# Patient Record
Sex: FEMALE | Race: WHITE | NOT HISPANIC OR LATINO | Employment: OTHER | URBAN - METROPOLITAN AREA
[De-identification: names, ages, dates, MRNs, and addresses within clinical notes are randomized per-mention and may not be internally consistent; named-entity substitution may affect disease eponyms.]

---

## 2017-01-05 ENCOUNTER — TELEPHONE (OUTPATIENT)
Dept: PULMONOLOGY | Facility: CLINIC | Age: 57
End: 2017-01-05

## 2017-01-05 NOTE — TELEPHONE ENCOUNTER
Patient called to schedule an appointment with the [facility] program. Left message for patient to contact clinic.

## 2017-01-16 ENCOUNTER — TELEPHONE (OUTPATIENT)
Dept: RESPIRATORY THERAPY | Facility: HOSPITAL | Age: 57
End: 2017-01-16

## 2017-01-16 NOTE — TELEPHONE ENCOUNTER
Patient called schedule an appointment with [facility]. Patient was seen in hospital. Patient is not ready to schedule an appointment at this time. Patient was educated on using her oxygen and on using medications. Patient states she is using Symbicort BID and Ventolin inhaler several times a day. She states she gets SOB on exertion even while wearing her oxygen. She states she dose have a cough productive of white sputum. She has an follow up appointment tomorrow she is advised to keep that appointment. She states she will contact the clinic once she is ready to schedule an appointment. Patient also states she is not using the Dulera because she is not able to afford the medication. Patient informed she may be able to get assists with getting her medications.

## 2017-05-14 ENCOUNTER — HOSPITAL ENCOUNTER (INPATIENT)
Facility: HOSPITAL | Age: 57
LOS: 13 days | Discharge: HOME HEALTH | DRG: 189 | End: 2017-05-27
Attending: EMERGENCY MEDICINE | Admitting: INTERNAL MEDICINE
Payer: MEDICARE

## 2017-05-14 DIAGNOSIS — R13.12 OROPHARYNGEAL DYSPHAGIA: ICD-10-CM

## 2017-05-14 DIAGNOSIS — R06.02 SOB (SHORTNESS OF BREATH): ICD-10-CM

## 2017-05-14 DIAGNOSIS — R06.03 RESPIRATORY DISTRESS: ICD-10-CM

## 2017-05-14 DIAGNOSIS — R53.81 PHYSICAL DECONDITIONING: ICD-10-CM

## 2017-05-14 DIAGNOSIS — R06.02 SHORTNESS OF BREATH: ICD-10-CM

## 2017-05-14 DIAGNOSIS — F41.9 ANXIETY: ICD-10-CM

## 2017-05-14 DIAGNOSIS — R94.31 ABNORMAL EKG: ICD-10-CM

## 2017-05-14 DIAGNOSIS — R94.31 ST ELEVATION: ICD-10-CM

## 2017-05-14 DIAGNOSIS — R07.9 CHEST PAIN: ICD-10-CM

## 2017-05-14 DIAGNOSIS — Z72.0 TOBACCO ABUSE: ICD-10-CM

## 2017-05-14 DIAGNOSIS — J18.9 PNEUMONIA OF RIGHT LUNG DUE TO INFECTIOUS ORGANISM, UNSPECIFIED PART OF LUNG: ICD-10-CM

## 2017-05-14 DIAGNOSIS — R57.9 SHOCK CIRCULATORY: ICD-10-CM

## 2017-05-14 DIAGNOSIS — R79.89 LFT ELEVATION: ICD-10-CM

## 2017-05-14 DIAGNOSIS — J44.1 COPD EXACERBATION: Primary | ICD-10-CM

## 2017-05-14 DIAGNOSIS — F05 DELIRIUM DUE TO ANOTHER MEDICAL CONDITION, ACUTE, HYPERACTIVE: ICD-10-CM

## 2017-05-14 DIAGNOSIS — J96.21 ACUTE ON CHRONIC RESPIRATORY FAILURE WITH HYPOXIA: ICD-10-CM

## 2017-05-14 DIAGNOSIS — I50.22 CHRONIC SYSTOLIC HEART FAILURE: ICD-10-CM

## 2017-05-14 PROBLEM — J96.01 ACUTE HYPOXEMIC RESPIRATORY FAILURE: Status: ACTIVE | Noted: 2017-05-14

## 2017-05-14 LAB
ANION GAP SERPL CALC-SCNC: 14 MMOL/L
BASOPHILS # BLD AUTO: 0.02 K/UL
BASOPHILS NFR BLD: 0.2 %
BNP SERPL-MCNC: 11 PG/ML
BUN SERPL-MCNC: 11 MG/DL
CALCIUM SERPL-MCNC: 9.6 MG/DL
CHLORIDE SERPL-SCNC: 97 MMOL/L
CO2 SERPL-SCNC: 24 MMOL/L
CREAT SERPL-MCNC: 0.8 MG/DL
DIFFERENTIAL METHOD: ABNORMAL
EOSINOPHIL # BLD AUTO: 0 K/UL
EOSINOPHIL NFR BLD: 0 %
ERYTHROCYTE [DISTWIDTH] IN BLOOD BY AUTOMATED COUNT: 13.9 %
EST. GFR  (AFRICAN AMERICAN): >60 ML/MIN/1.73 M^2
EST. GFR  (NON AFRICAN AMERICAN): >60 ML/MIN/1.73 M^2
GLUCOSE SERPL-MCNC: 116 MG/DL
HCO3 UR-SCNC: 21.7 MMOL/L
HCO3 UR-SCNC: 24.8 MMOL/L
HCT VFR BLD AUTO: 43.4 %
HGB BLD-MCNC: 14.1 G/DL
INR PPP: 1
LACTATE SERPL-SCNC: 2 MMOL/L
LACTATE SERPL-SCNC: 2.1 MMOL/L
LACTATE SERPL-SCNC: 3.7 MMOL/L
LYMPHOCYTES # BLD AUTO: 0.5 K/UL
LYMPHOCYTES NFR BLD: 4.1 %
Lab: NORMAL
MCH RBC QN AUTO: 33.2 PG
MCHC RBC AUTO-ENTMCNC: 32.5 %
MCV RBC AUTO: 102 FL
MONOCYTES # BLD AUTO: 1.2 K/UL
MONOCYTES NFR BLD: 8.9 %
NEUTROPHILS # BLD AUTO: 11.3 K/UL
NEUTROPHILS NFR BLD: 86.8 %
PH SMN: 7.34 [PH]
PH SMN: 7.42 [PH]
PLATELET # BLD AUTO: 163 K/UL
PMV BLD AUTO: 12.2 FL
PO2 BLDA: 68 MM[HG]
PO2 BLDA: 80 MM[HG]
POC BE: -4
POC BE: 0
POC CO2: 37.4
POC CO2: 39.7
POC FIO2: 32
POC SATURATED O2: 94
POC SATURATED O2: 95
POTASSIUM SERPL-SCNC: 4.3 MMOL/L
PROTHROMBIN TIME: 10.1 SEC
RBC # BLD AUTO: 4.25 M/UL
SODIUM SERPL-SCNC: 135 MMOL/L
TROPONIN I SERPL DL<=0.01 NG/ML-MCNC: 0.02 NG/ML
WBC # BLD AUTO: 13.03 K/UL

## 2017-05-14 PROCEDURE — 25500020 PHARM REV CODE 255: Performed by: EMERGENCY MEDICINE

## 2017-05-14 PROCEDURE — 63600175 PHARM REV CODE 636 W HCPCS: Performed by: NURSE PRACTITIONER

## 2017-05-14 PROCEDURE — 82803 BLOOD GASES ANY COMBINATION: CPT

## 2017-05-14 PROCEDURE — 25000003 PHARM REV CODE 250: Performed by: NURSE PRACTITIONER

## 2017-05-14 PROCEDURE — 83605 ASSAY OF LACTIC ACID: CPT | Mod: 91

## 2017-05-14 PROCEDURE — 36600 WITHDRAWAL OF ARTERIAL BLOOD: CPT

## 2017-05-14 PROCEDURE — 80048 BASIC METABOLIC PNL TOTAL CA: CPT

## 2017-05-14 PROCEDURE — 87040 BLOOD CULTURE FOR BACTERIA: CPT | Mod: 59

## 2017-05-14 PROCEDURE — 94640 AIRWAY INHALATION TREATMENT: CPT

## 2017-05-14 PROCEDURE — 96361 HYDRATE IV INFUSION ADD-ON: CPT

## 2017-05-14 PROCEDURE — 96367 TX/PROPH/DG ADDL SEQ IV INF: CPT

## 2017-05-14 PROCEDURE — 84484 ASSAY OF TROPONIN QUANT: CPT

## 2017-05-14 PROCEDURE — 85610 PROTHROMBIN TIME: CPT

## 2017-05-14 PROCEDURE — 87205 SMEAR GRAM STAIN: CPT

## 2017-05-14 PROCEDURE — 99900035 HC TECH TIME PER 15 MIN (STAT)

## 2017-05-14 PROCEDURE — 85025 COMPLETE CBC W/AUTO DIFF WBC: CPT

## 2017-05-14 PROCEDURE — 87185 SC STD ENZYME DETCJ PER NZM: CPT

## 2017-05-14 PROCEDURE — 87070 CULTURE OTHR SPECIMN AEROBIC: CPT

## 2017-05-14 PROCEDURE — 21400001 HC TELEMETRY ROOM

## 2017-05-14 PROCEDURE — 93010 ELECTROCARDIOGRAM REPORT: CPT | Mod: ,,, | Performed by: INTERNAL MEDICINE

## 2017-05-14 PROCEDURE — 25000003 PHARM REV CODE 250: Performed by: HOSPITALIST

## 2017-05-14 PROCEDURE — 25000242 PHARM REV CODE 250 ALT 637 W/ HCPCS: Performed by: NURSE PRACTITIONER

## 2017-05-14 PROCEDURE — 25000242 PHARM REV CODE 250 ALT 637 W/ HCPCS: Performed by: EMERGENCY MEDICINE

## 2017-05-14 PROCEDURE — 96372 THER/PROPH/DIAG INJ SC/IM: CPT

## 2017-05-14 PROCEDURE — 63600175 PHARM REV CODE 636 W HCPCS: Performed by: EMERGENCY MEDICINE

## 2017-05-14 PROCEDURE — 63600175 PHARM REV CODE 636 W HCPCS: Performed by: HOSPITALIST

## 2017-05-14 PROCEDURE — 27000221 HC OXYGEN, UP TO 24 HOURS

## 2017-05-14 PROCEDURE — 99285 EMERGENCY DEPT VISIT HI MDM: CPT | Mod: 25

## 2017-05-14 PROCEDURE — 96375 TX/PRO/DX INJ NEW DRUG ADDON: CPT

## 2017-05-14 PROCEDURE — 93005 ELECTROCARDIOGRAM TRACING: CPT

## 2017-05-14 PROCEDURE — 96365 THER/PROPH/DIAG IV INF INIT: CPT

## 2017-05-14 PROCEDURE — 25000003 PHARM REV CODE 250: Performed by: EMERGENCY MEDICINE

## 2017-05-14 PROCEDURE — 83880 ASSAY OF NATRIURETIC PEPTIDE: CPT

## 2017-05-14 RX ORDER — PAROXETINE HYDROCHLORIDE 20 MG/1
20 TABLET, FILM COATED ORAL EVERY MORNING
Status: DISCONTINUED | OUTPATIENT
Start: 2017-05-15 | End: 2017-05-17

## 2017-05-14 RX ORDER — SODIUM CHLORIDE 9 MG/ML
1000 INJECTION, SOLUTION INTRAVENOUS ONCE
Status: COMPLETED | OUTPATIENT
Start: 2017-05-14 | End: 2017-05-14

## 2017-05-14 RX ORDER — IBUPROFEN 200 MG
1 TABLET ORAL DAILY
Status: COMPLETED | OUTPATIENT
Start: 2017-05-14 | End: 2017-05-19

## 2017-05-14 RX ORDER — BENZONATATE 100 MG/1
200 CAPSULE ORAL 3 TIMES DAILY PRN
Status: DISCONTINUED | OUTPATIENT
Start: 2017-05-14 | End: 2017-05-17

## 2017-05-14 RX ORDER — DIPHENHYDRAMINE HYDROCHLORIDE 50 MG/ML
6.25 INJECTION INTRAMUSCULAR; INTRAVENOUS EVERY 4 HOURS PRN
Status: DISCONTINUED | OUTPATIENT
Start: 2017-05-14 | End: 2017-05-17

## 2017-05-14 RX ORDER — ARFORMOTEROL TARTRATE 15 UG/2ML
15 SOLUTION RESPIRATORY (INHALATION) EVERY 12 HOURS
Status: DISCONTINUED | OUTPATIENT
Start: 2017-05-14 | End: 2017-05-27 | Stop reason: HOSPADM

## 2017-05-14 RX ORDER — GUAIFENESIN 600 MG/1
600 TABLET, EXTENDED RELEASE ORAL 2 TIMES DAILY
Status: DISCONTINUED | OUTPATIENT
Start: 2017-05-14 | End: 2017-05-15

## 2017-05-14 RX ORDER — METHYLPREDNISOLONE SOD SUCC 125 MG
125 VIAL (EA) INJECTION ONCE
Status: COMPLETED | OUTPATIENT
Start: 2017-05-14 | End: 2017-05-14

## 2017-05-14 RX ORDER — ALBUTEROL SULFATE 2.5 MG/.5ML
2.5 SOLUTION RESPIRATORY (INHALATION) EVERY 4 HOURS
Status: DISCONTINUED | OUTPATIENT
Start: 2017-05-14 | End: 2017-05-14

## 2017-05-14 RX ORDER — FAMOTIDINE 20 MG/1
20 TABLET, FILM COATED ORAL 2 TIMES DAILY
Status: DISCONTINUED | OUTPATIENT
Start: 2017-05-14 | End: 2017-05-17

## 2017-05-14 RX ORDER — LURASIDONE HYDROCHLORIDE 40 MG/1
80 TABLET, FILM COATED ORAL NIGHTLY
Status: DISCONTINUED | OUTPATIENT
Start: 2017-05-14 | End: 2017-05-17

## 2017-05-14 RX ORDER — MONTELUKAST SODIUM 10 MG/1
10 TABLET ORAL NIGHTLY
Status: DISCONTINUED | OUTPATIENT
Start: 2017-05-14 | End: 2017-05-18

## 2017-05-14 RX ORDER — ROPINIROLE 1 MG/1
1 TABLET, FILM COATED ORAL NIGHTLY
Status: DISCONTINUED | OUTPATIENT
Start: 2017-05-14 | End: 2017-05-17

## 2017-05-14 RX ORDER — LORAZEPAM 0.5 MG/1
0.5 TABLET ORAL EVERY 6 HOURS PRN
Status: DISCONTINUED | OUTPATIENT
Start: 2017-05-14 | End: 2017-05-18

## 2017-05-14 RX ORDER — AMITRIPTYLINE HYDROCHLORIDE 25 MG/1
25 TABLET, FILM COATED ORAL 3 TIMES DAILY
Status: DISCONTINUED | OUTPATIENT
Start: 2017-05-14 | End: 2017-05-17

## 2017-05-14 RX ORDER — ONDANSETRON 2 MG/ML
4 INJECTION INTRAMUSCULAR; INTRAVENOUS EVERY 8 HOURS PRN
Status: DISCONTINUED | OUTPATIENT
Start: 2017-05-14 | End: 2017-05-27 | Stop reason: HOSPADM

## 2017-05-14 RX ORDER — BUDESONIDE 0.5 MG/2ML
0.5 INHALANT ORAL EVERY 12 HOURS
Status: DISCONTINUED | OUTPATIENT
Start: 2017-05-14 | End: 2017-05-27 | Stop reason: HOSPADM

## 2017-05-14 RX ORDER — SODIUM CHLORIDE 9 MG/ML
INJECTION, SOLUTION INTRAVENOUS CONTINUOUS
Status: DISCONTINUED | OUTPATIENT
Start: 2017-05-14 | End: 2017-05-14

## 2017-05-14 RX ORDER — FLUOXETINE HYDROCHLORIDE 20 MG/1
20 CAPSULE ORAL DAILY
Status: DISCONTINUED | OUTPATIENT
Start: 2017-05-15 | End: 2017-05-17

## 2017-05-14 RX ORDER — IPRATROPIUM BROMIDE AND ALBUTEROL SULFATE 2.5; .5 MG/3ML; MG/3ML
3 SOLUTION RESPIRATORY (INHALATION) ONCE
Status: COMPLETED | OUTPATIENT
Start: 2017-05-14 | End: 2017-05-14

## 2017-05-14 RX ORDER — LEVALBUTEROL INHALATION SOLUTION 0.63 MG/3ML
0.63 SOLUTION RESPIRATORY (INHALATION) EVERY 8 HOURS
Status: DISCONTINUED | OUTPATIENT
Start: 2017-05-14 | End: 2017-05-14

## 2017-05-14 RX ORDER — IPRATROPIUM BROMIDE AND ALBUTEROL SULFATE 2.5; .5 MG/3ML; MG/3ML
3 SOLUTION RESPIRATORY (INHALATION)
Status: COMPLETED | OUTPATIENT
Start: 2017-05-14 | End: 2017-05-14

## 2017-05-14 RX ORDER — MAG HYDROX/ALUMINUM HYD/SIMETH 200-200-20
30 SUSPENSION, ORAL (FINAL DOSE FORM) ORAL EVERY 6 HOURS PRN
Status: DISCONTINUED | OUTPATIENT
Start: 2017-05-14 | End: 2017-05-27 | Stop reason: HOSPADM

## 2017-05-14 RX ORDER — IPRATROPIUM BROMIDE AND ALBUTEROL SULFATE 2.5; .5 MG/3ML; MG/3ML
3 SOLUTION RESPIRATORY (INHALATION) EVERY 6 HOURS
Status: DISCONTINUED | OUTPATIENT
Start: 2017-05-14 | End: 2017-05-24

## 2017-05-14 RX ORDER — CIPROFLOXACIN 2 MG/ML
400 INJECTION, SOLUTION INTRAVENOUS
Status: DISCONTINUED | OUTPATIENT
Start: 2017-05-14 | End: 2017-05-16

## 2017-05-14 RX ORDER — ENOXAPARIN SODIUM 100 MG/ML
40 INJECTION SUBCUTANEOUS EVERY 24 HOURS
Status: DISCONTINUED | OUTPATIENT
Start: 2017-05-14 | End: 2017-05-20

## 2017-05-14 RX ORDER — ACETAMINOPHEN 325 MG/1
650 TABLET ORAL EVERY 6 HOURS PRN
Status: DISCONTINUED | OUTPATIENT
Start: 2017-05-14 | End: 2017-05-27 | Stop reason: HOSPADM

## 2017-05-14 RX ORDER — ONDANSETRON 2 MG/ML
4 INJECTION INTRAMUSCULAR; INTRAVENOUS
Status: COMPLETED | OUTPATIENT
Start: 2017-05-14 | End: 2017-05-14

## 2017-05-14 RX ADMIN — IPRATROPIUM BROMIDE AND ALBUTEROL SULFATE 3 ML: .5; 3 SOLUTION RESPIRATORY (INHALATION) at 08:05

## 2017-05-14 RX ADMIN — PANTOPRAZOLE SODIUM 600 MG: 40 TABLET, DELAYED RELEASE ORAL at 04:05

## 2017-05-14 RX ADMIN — ONDANSETRON 4 MG: 2 INJECTION INTRAMUSCULAR; INTRAVENOUS at 08:05

## 2017-05-14 RX ADMIN — LORAZEPAM 0.5 MG: 0.5 TABLET ORAL at 04:05

## 2017-05-14 RX ADMIN — METHYLPREDNISOLONE SODIUM SUCCINATE 40 MG: 40 INJECTION, POWDER, FOR SOLUTION INTRAMUSCULAR; INTRAVENOUS at 11:05

## 2017-05-14 RX ADMIN — SODIUM CHLORIDE 1000 ML: 0.9 INJECTION, SOLUTION INTRAVENOUS at 08:05

## 2017-05-14 RX ADMIN — ROPINIROLE HYDROCHLORIDE 1 MG: 1 TABLET, FILM COATED ORAL at 09:05

## 2017-05-14 RX ADMIN — SODIUM CHLORIDE 1000 ML: 0.9 INJECTION, SOLUTION INTRAVENOUS at 11:05

## 2017-05-14 RX ADMIN — BUDESONIDE 0.5 MG: 0.5 SUSPENSION RESPIRATORY (INHALATION) at 07:05

## 2017-05-14 RX ADMIN — ARFORMOTEROL TARTRATE 15 MCG: 15 SOLUTION RESPIRATORY (INHALATION) at 07:05

## 2017-05-14 RX ADMIN — SODIUM CHLORIDE 1000 ML: 0.9 INJECTION, SOLUTION INTRAVENOUS at 05:05

## 2017-05-14 RX ADMIN — PIPERACILLIN AND TAZOBACTAM 4.5 G: 4; .5 INJECTION, POWDER, LYOPHILIZED, FOR SOLUTION INTRAVENOUS; PARENTERAL at 10:05

## 2017-05-14 RX ADMIN — ENOXAPARIN SODIUM 40 MG: 100 INJECTION SUBCUTANEOUS at 05:05

## 2017-05-14 RX ADMIN — IPRATROPIUM BROMIDE AND ALBUTEROL SULFATE 3 ML: .5; 3 SOLUTION RESPIRATORY (INHALATION) at 07:05

## 2017-05-14 RX ADMIN — BENZONATATE 200 MG: 100 CAPSULE ORAL at 05:05

## 2017-05-14 RX ADMIN — METHYLPREDNISOLONE SODIUM SUCCINATE 125 MG: 125 INJECTION, POWDER, FOR SOLUTION INTRAMUSCULAR; INTRAVENOUS at 08:05

## 2017-05-14 RX ADMIN — NICOTINE 1 PATCH: 21 PATCH, EXTENDED RELEASE TRANSDERMAL at 05:05

## 2017-05-14 RX ADMIN — LEVALBUTEROL 0.63 MG: 0.63 SOLUTION RESPIRATORY (INHALATION) at 03:05

## 2017-05-14 RX ADMIN — VANCOMYCIN HYDROCHLORIDE 1000 MG: 1 INJECTION, POWDER, LYOPHILIZED, FOR SOLUTION INTRAVENOUS at 10:05

## 2017-05-14 RX ADMIN — CIPROFLOXACIN 400 MG: 2 INJECTION, SOLUTION INTRAVENOUS at 05:05

## 2017-05-14 RX ADMIN — AMITRIPTYLINE HYDROCHLORIDE 25 MG: 25 TABLET, FILM COATED ORAL at 09:05

## 2017-05-14 RX ADMIN — MONTELUKAST SODIUM 10 MG: 10 TABLET, FILM COATED ORAL at 09:05

## 2017-05-14 RX ADMIN — FAMOTIDINE 20 MG: 20 TABLET ORAL at 09:05

## 2017-05-14 RX ADMIN — PIPERACILLIN AND TAZOBACTAM 4.5 G: 4; .5 INJECTION, POWDER, LYOPHILIZED, FOR SOLUTION INTRAVENOUS; PARENTERAL at 07:05

## 2017-05-14 RX ADMIN — LORAZEPAM 0.5 MG: 0.5 TABLET ORAL at 10:05

## 2017-05-14 RX ADMIN — ALBUTEROL SULFATE 2.5 MG: 2.5 SOLUTION RESPIRATORY (INHALATION) at 01:05

## 2017-05-14 RX ADMIN — IOHEXOL 100 ML: 350 INJECTION, SOLUTION INTRAVENOUS at 10:05

## 2017-05-14 NOTE — ED NOTES
Patient sleeping in bed, no acute distress noted, respirations even and unlabored while using 2L of oxygen via nasal cannula, and skin is warm and dry. Patient verbalized understanding of status and plan of care. Patient denies needs at this time. Side rails up x 2, call light in reach, bed low and locked. Family at bedside. Will continue to monitor.

## 2017-05-14 NOTE — ED NOTES
Patient states she is feeling anxious which she believes could be contributing to her exacerbation. Prn Ativan will be provided.

## 2017-05-14 NOTE — ED NOTES
Patient resting in bed, no acute distress noted, awake, alert, and oriented x 3, calm, respirations even and unlabored while on 2L O2 via nasal cannula, and skin is warm and dry. IVF currently infusing. Patient verbalized understanding of status and plan of care. Patient denies needs at this time. Side rails up x 2, call light in reach, bed low and locked. Will continue to monitor.

## 2017-05-14 NOTE — H&P
[facility] Hospital Medicine  History & Physical    Patient Name: Jeniffer Fernandez  MRN: [id number]  Admission Date: 5/14/2017  Attending Physician: Qasim Hernández MD   Primary Care Provider: Ben Pelaez Jr, MD         Patient information was obtained from patient and ER records.     Subjective:     Principal Problem:Acute on chronic respiratory failure with hypoxia    Chief Complaint:   Chief Complaint   Patient presents with    Shortness of Breath     hx of emphysema & on home O2 at 2 lpm prn, reports SOB x 1 week, seen by pulmonologist and prescribed prednisone, reports she felt better at first but is now getting worse, reports cough producing thick yellow sputum        HPI: Jeniffer Fernandez is a 58 yo female + smoker with COPD and chronic respiratory failure who presented with worsening SOB over 1 week. For 5 days she has worn oxygen continuously. She describes frequent productive cough, wheezing, SOB, generalized weakness and FRANCES. Last night she had subjective fever and sweats. Pt has had several neb treatments and still having SOB. In the ED, ABG reflects acute on chronic hypoxic respiratory failure and O2 sat 87 %. CTA of Chest indicated a patchy RUL/RLL interstitial infiltrate.     Past Medical History:   Diagnosis Date    Anxiety     Bipolar disorder     COPD (chronic obstructive pulmonary disease)     Depression     Respiratory distress     SOB (shortness of breath)        Past Surgical History:   Procedure Laterality Date    COLONOSCOPY      TUBAL LIGATION         Review of patient's allergies indicates:   Allergen Reactions    Codeine Itching    Codeine phosphate      Itchy (skin)^       No current facility-administered medications on file prior to encounter.      Current Outpatient Prescriptions on File Prior to Encounter   Medication Sig    albuterol 90 mcg/actuation inhaler Inhale 2 puffs into the lungs every 6 (six) hours as needed for Wheezing. Dispense with spacer.     amitriptyline (ELAVIL) 50 MG tablet Take 1 tablet (50 mg total) by mouth every evening. (Patient taking differently: Take 25 mg by mouth 3 (three) times daily. )    lorazepam (ATIVAN) 0.5 MG tablet Take 0.5 mg by mouth every 6 (six) hours as needed for Anxiety.    montelukast (SINGULAIR) 10 mg tablet Take 10 mg by mouth every evening.    paroxetine (PAXIL) 20 MG tablet Take 20 mg by mouth every morning.    predniSONE (DELTASONE) 10 MG tablet Please take 20mg twice daily x 3 days, then take 10 mg twice daily x 2 days, then take 10 mg once daily x 2 days    ropinirole (REQUIP) 1 MG tablet Take 1 mg by mouth every evening.    b complex vitamins tablet Take 1 tablet by mouth once daily.    dicyclomine (BENTYL) 20 mg tablet Take 20 mg by mouth as needed.    docusate sodium (COLACE) 100 MG capsule Take 1 capsule (100 mg total) by mouth 2 (two) times daily.    fluoxetine (PROZAC) 20 MG capsule Take 20 mg by mouth once daily.    fluticasone (FLONASE) 50 mcg/actuation nasal spray 2 sprays by Each Nare route once daily.    hydrocodone-acetaminophen 10-325mg (NORCO)  mg Tab Take 1 tablet by mouth every 6 (six) hours as needed.    hydrOXYzine (ATARAX) 25 MG tablet Take 1 tablet (25 mg total) by mouth 4 (four) times daily as needed for Itching. (Patient taking differently: Take 25 mg by mouth 2 (two) times daily. )    LINACLOTIDE (LINZESS ORAL) Take by mouth daily as needed. IBS    lurasidone (LATUDA) 40 mg Tab tablet Take 80 mg by mouth every evening.     mometasone-formoterol (DULERA) 200-5 mcg/actuation inhaler Inhale 2 puffs into the lungs 2 (two) times daily.    nicotine (NICODERM CQ) 21 mg/24 hr Place 1 patch onto the skin once daily.    polyethylene glycol (GLYCOLAX) 17 gram PwPk Take 17 g by mouth 2 (two) times daily.     Family History     Problem Relation (Age of Onset)    Aneurysm Brother    COPD Mother    Cancer Father    No Known Problems Son, Son    Suicide Son        Social History Main  Topics    Smoking status: Current Every Day Smoker     Packs/day: 0.50     Years: 35.00     Types: Cigarettes    Smokeless tobacco: Not on file      Comment: decreasing amount of cigarettes    Alcohol use Yes      Comment: occassionally    Drug use: No    Sexual activity: Not on file     Review of Systems   Constitutional: Positive for appetite change, diaphoresis and fever.   HENT: Negative.    Eyes: Negative for pain and redness.   Respiratory: Positive for cough, shortness of breath and wheezing.    Cardiovascular: Positive for palpitations. Negative for chest pain and leg swelling.   Gastrointestinal: Negative for abdominal pain, constipation, diarrhea, nausea and vomiting.   Genitourinary: Negative for difficulty urinating, dysuria, frequency and hematuria.   Musculoskeletal: Negative for arthralgias, back pain and myalgias.   Skin: Negative for pallor, rash and wound.   Neurological: Positive for dizziness, weakness and light-headedness. Negative for headaches.   Psychiatric/Behavioral: Negative for confusion.     Objective:     Vital Signs (Most Recent):  Temp: 99.5 °F (37.5 °C) (05/14/17 1114)  Pulse: (!) 112 (05/14/17 1301)  Resp: 20 (05/14/17 1301)  BP: 109/71 (05/14/17 1118)  SpO2: 98 % (05/14/17 1301) Vital Signs (24h Range):  Temp:  [99 °F (37.2 °C)-99.5 °F (37.5 °C)] 99.5 °F (37.5 °C)  Pulse:  [112-137] 112  Resp:  [16-30] 20  SpO2:  [87 %-100 %] 98 %  BP: (109-116)/(63-80) 109/71     Weight: 48.1 kg (106 lb)  Body mass index is 18.19 kg/(m^2).    Physical Exam   Constitutional: She is oriented to person, place, and time. She appears well-developed and well-nourished. No distress.   HENT:   Head: Normocephalic and atraumatic.   Eyes: Conjunctivae are normal. No scleral icterus.   Neck: Normal range of motion. Neck supple.   Cardiovascular: Regular rhythm and normal heart sounds.  Tachycardia present.  Exam reveals no gallop and no friction rub.    No murmur heard.  Pulmonary/Chest: She has  decreased breath sounds in the right upper field, the right middle field, the right lower field, the left upper field, the left middle field and the left lower field.   Some pursed lip breathing    Decreased breath sounds with wheezing   Abdominal: Soft. Bowel sounds are normal.   Musculoskeletal: Normal range of motion. She exhibits no edema or tenderness.   Neurological: She is alert and oriented to person, place, and time.   Skin: Skin is warm and dry.   Psychiatric: She has a normal mood and affect. Her behavior is normal.   Nursing note and vitals reviewed.       Significant Labs:   CBC:   Recent Labs  Lab 05/14/17  0800   WBC 13.03*   HGB 14.1   HCT 43.4        CMP:   Recent Labs  Lab 05/14/17  0800   *   K 4.3   CL 97   CO2 24   *   BUN 11   CREATININE 0.8   CALCIUM 9.6   ANIONGAP 14   EGFRNONAA >60     All pertinent labs within the past 24 hours have been reviewed.    Significant Imaging: I have reviewed all pertinent imaging results/findings within the past 24 hours.    Assessment/Plan:     Pneumonia involving right lung  Triple ABX - deescalate when appropriate  Supplemental oxygen  Respiratory culture      COPD exacerbation  Supplemental oxygen  Xopenex  IV Corticosteroids        Tobacco abuse  Encouraged smoking cessation  Denies need for Nicotine patch      * Acute on chronic respiratory failure with hypoxia  Supplemental oxygen to maintain sat > 92 % - wean as approximate      Anxiety  Resume home med      Bipolar disorder  Resume home med      VTE Risk Mitigation         Ordered     enoxaparin injection 40 mg  Daily     Route:  Subcutaneous        05/14/17 1512     Medium Risk of VTE  Once      05/14/17 1132     Place sequential compression device  Until discontinued      05/14/17 1132        Jen Valdez NP  Department of Hospital Medicine   [facility]

## 2017-05-14 NOTE — ED NOTES
Respiratory at bedside with patient, per conversation with them and patient, BiPap was suggested. Jen Valdez with hosp med notified about this suggestion.

## 2017-05-14 NOTE — ED PROVIDER NOTES
SCRIBE #1 NOTE: I, Corinne Mack, am scribing for, and in the presence of, Qasim Hernández MD. I have scribed the entire note.      History      Chief Complaint   Patient presents with    Shortness of Breath     hx of emphysema & on home O2 at 2 lpm prn, reports SOB x 1 week, seen by pulmonologist and prescribed prednisone, reports she felt better at first but is now getting worse, reports cough producing thick yellow sputum       Review of patient's allergies indicates:   Allergen Reactions    Codeine Itching    Codeine phosphate      Itchy (skin)^        HPI   HPI    5/14/2017, 7:48 AM   History obtained from the patient      History of Present Illness: Jeniffer Fernandez is a 57 y.o. female patient who presents to the Emergency Department for SOB which onset gradually a week ago. Symptoms are constant and moderate in severity. Pt has hx of COPD. Pt was seen by pulmonologist and was prescribed prednisone. Pt felt better at first, but is now getting worse. Pt was on oxygen in the past, but weaned herself off. Pt has been on 2 liters of oxygen since Wednesday but remains SOB at rest and with exertion. No mitigating or exacerbating factors reported. Associated sxs include productive cough and lower left chest wall pain with coughing. Patient denies any fever, chills, congestion, sore throat, N/V/D, back pain, neck pain, dysuria, HA, dizziness, and all other sxs at this time. Prior tx includes multiple nebulizers and inhaler use with no improvement. No further complaints or concerns at this time.         Arrival mode: Personal vehicle     PCP: Ben Pelaez Jr, MD       Past Medical History:  Past Medical History:   Diagnosis Date    Anxiety     Bipolar disorder     COPD (chronic obstructive pulmonary disease)     Depression     Respiratory distress     SOB (shortness of breath)        Past Surgical History:  Past Surgical History:   Procedure Laterality Date    COLONOSCOPY      TUBAL LIGATION            Family History:  Family History   Problem Relation Age of Onset    COPD Mother     Cancer Father      brain    Aneurysm Brother     Suicide Son     No Known Problems Son     No Known Problems Son        Social History:  Social History     Social History Main Topics    Smoking status: Current Every Day Smoker     Packs/day: 0.50     Years: 35.00     Types: Cigarettes    Smokeless tobacco: Not given      Comment: decreasing amount of cigarettes    Alcohol use Yes      Comment: occassionally    Drug use: No    Sexual activity: Not given       ROS   Review of Systems   Constitutional: Negative for chills, fatigue and fever.   HENT: Negative for congestion and sore throat.    Respiratory: Positive for cough (productive) and shortness of breath.    Cardiovascular: Negative for chest pain and leg swelling.   Gastrointestinal: Negative for abdominal pain, diarrhea, nausea and vomiting.   Genitourinary: Negative for difficulty urinating, dysuria, flank pain and frequency.   Musculoskeletal: Negative for back pain and neck pain.        (+) left lower chest wall pain with cough   Skin: Negative for rash and wound.   Neurological: Negative for dizziness, syncope, numbness and headaches.   All other systems reviewed and are negative.      Physical Exam    Initial Vitals   BP Pulse Resp Temp SpO2   05/14/17 0744 05/14/17 0744 05/14/17 0744 05/14/17 0744 05/14/17 0744   116/75 137 30 99 °F (37.2 °C) 93 %      Physical Exam  Nursing Notes and Vital Signs Reviewed.  Constitutional: Patient is in no acute distress. Awake and alert. Well-developed and well-nourished.  Head: Atraumatic. Normocephalic.  Eyes: PERRL. EOM intact. Conjunctivae are not pale. No scleral icterus.  ENT: Mucous membranes are moist. Oropharynx is clear and symmetric.    Neck: Supple. Full ROM. No lymphadenopathy.  Cardiovascular:Tachycardic. Regular rhythm. No murmurs, rubs, or gallops. Distal pulses are 2+ and symmetric.  Pulmonary/Chest: Mild  "respiratory distress. Wheezing and diminished breath sound bilaterally. No rales or rhonchi.  Abdominal: Soft and non-distended.  There is no tenderness.    Musculoskeletal: Moves all extremities. No obvious deformities. No edema. No calf tenderness.   Skin: Warm and dry.  Neurological:  Alert, awake, and appropriate.  Normal speech.  No acute focal neurological deficits are appreciated. Grossly neurologically intact.  Psychiatric: Normal affect. Good eye contact. Appropriate in content.    ED Course    Procedures  ED Vital Signs:  Vitals:    05/14/17 0744 05/14/17 0800 05/14/17 0801 05/14/17 0844   BP: 116/75  113/69    Pulse: (!) 137 (!) 130 (!) 130 (!) 130   Resp: (!) 30  (!) 23 16   Temp: 99 °F (37.2 °C)      TempSrc: Oral      SpO2: (!) 93%  97% (!) 94%   Weight: 48.1 kg (106 lb)      Height: 5' 4" (1.626 m)       05/14/17 0846 05/14/17 0849 05/14/17 0853 05/14/17 0932   BP: 114/80   113/63   Pulse: (!) 128 (!) 130 (!) 137 (!) 124   Resp: 19 16 19 (!) 29   Temp:       TempSrc:       SpO2: 98% (!) 94% 100% 96%   Weight:       Height:        05/14/17 1114 05/14/17 1118   BP:  109/71   Pulse: (!) 115 (!) 115   Resp: 19 (!) 24   Temp: 99.5 °F (37.5 °C)    TempSrc: Oral    SpO2: 95% (!) 87%   Weight:     Height:         Abnormal Lab Results:  Labs Reviewed   CBC W/ AUTO DIFFERENTIAL - Abnormal; Notable for the following:        Result Value    WBC 13.03 (*)      (*)     MCH 33.2 (*)     Gran # 11.3 (*)     Lymph # 0.5 (*)     Mono # 1.2 (*)     Gran% 86.8 (*)     Lymph% 4.1 (*)     All other components within normal limits   BASIC METABOLIC PANEL - Abnormal; Notable for the following:     Sodium 135 (*)     Glucose 116 (*)     All other components within normal limits   CULTURE, BLOOD   CULTURE, BLOOD   B-TYPE NATRIURETIC PEPTIDE   PROTIME-INR   TROPONIN I   LACTIC ACID, PLASMA   LACTIC ACID, PLASMA   LACTIC ACID, PLASMA        All Lab Results:  Results for orders placed or performed during the hospital " encounter of 05/14/17   CBC auto differential   Result Value Ref Range    WBC 13.03 (H) 3.90 - 12.70 K/uL    RBC 4.25 4.00 - 5.40 M/uL    Hemoglobin 14.1 12.0 - 16.0 g/dL    Hematocrit 43.4 37.0 - 48.5 %     (H) 82 - 98 fL    MCH 33.2 (H) 27.0 - 31.0 pg    MCHC 32.5 32.0 - 36.0 %    RDW 13.9 11.5 - 14.5 %    Platelets 163 150 - 350 K/uL    MPV 12.2 9.2 - 12.9 fL    Gran # 11.3 (H) 1.8 - 7.7 K/uL    Lymph # 0.5 (L) 1.0 - 4.8 K/uL    Mono # 1.2 (H) 0.3 - 1.0 K/uL    Eos # 0.0 0.0 - 0.5 K/uL    Baso # 0.02 0.00 - 0.20 K/uL    Gran% 86.8 (H) 38.0 - 73.0 %    Lymph% 4.1 (L) 18.0 - 48.0 %    Mono% 8.9 4.0 - 15.0 %    Eosinophil% 0.0 0.0 - 8.0 %    Basophil% 0.2 0.0 - 1.9 %    Differential Method Automated    Brain natriuretic peptide   Result Value Ref Range    BNP 11 0 - 99 pg/mL   Basic metabolic panel   Result Value Ref Range    Sodium 135 (L) 136 - 145 mmol/L    Potassium 4.3 3.5 - 5.1 mmol/L    Chloride 97 95 - 110 mmol/L    CO2 24 23 - 29 mmol/L    Glucose 116 (H) 70 - 110 mg/dL    BUN, Bld 11 6 - 20 mg/dL    Creatinine 0.8 0.5 - 1.4 mg/dL    Calcium 9.6 8.7 - 10.5 mg/dL    Anion Gap 14 8 - 16 mmol/L    eGFR if African American >60 >60 mL/min/1.73 m^2    eGFR if non African American >60 >60 mL/min/1.73 m^2   Protime-INR   Result Value Ref Range    Prothrombin Time 10.1 9.0 - 12.5 sec    INR 1.0 0.8 - 1.2   Troponin I   Result Value Ref Range    Troponin I 0.021 0.000 - 0.026 ng/mL   Lactic acid, plasma   Result Value Ref Range    Lactate (Lactic Acid) 2.0 0.5 - 2.2 mmol/L   POCT Arterial Blood Gas-Resp Once   Result Value Ref Range    POC PH 7.42     CO2, POC 37.4     POC PO2 68     POC BE 0.00     POC HCO3 24.80     POC SATURATED O2 94     POC FIO2 32     POC Device nasal cannula     POC Draw Site left radial          Imaging Results:  Imaging Results         CTA Chest Non-Coronary (Final result) Result time:  05/14/17 10:56:46    Final result by Dustin Caal MD (05/14/17 10:56:46)    Impression:       Negative for pulmonary embolus.    Patchy right upper lobe and right lower lobe interstitial infiltrate/pneumonia with some nodularity at the dependent right lower lobe.  No pleural effusion.  Short term imaging followup after appropriate treatment is recommended.    Mediastinal adenopathy, perhaps reactive.  Attention on followup.    Emphysema.    Moderate stenosis proximal celiac artery.          All CT scans at this facility use dose modulation, iterative reconstruction, and/or weight based dosing when appropriate to reduce radiation dose to as low as reasonably achievable.      Electronically signed by: DAVID CAAL MD  Date:     05/14/17  Time:    10:56     Narrative:    EXAM: CTA CHEST NON CORONARY    CLINICAL HISTORY: shortness of breath    TECHNIQUE: CT angiogram performed of the chest following IV contrast administration to evaluate for pulmonary emboli. Multiplanar MIP reformations performed.    COMPARISON STUDIES: Chest x-ray May 14, 2017    FINDINGS:  No evidence of pulmonary embolus.  Minimal aortic atherosclerosis without aneurysm or dissection.    Marked emphysematous changes in the lungs with apical and paraseptal bullous changes.  Patchy interstitial infiltrate posterior right upper lobe and posterior right lower lobe.  Some areas of nodularity in the dependent right lung base within this area of interstitial thickening with largest area of nodularity measuring 1.9 cm.    Numerous enlarged mediastinal lymph nodes are noted with a representative subcarinal lymph node measuring 1.4 x 1.3 cm.    Right hepatic lobe cyst.  Moderate narrowing proximal segment celiac artery.            X-Ray Chest 1 View (Final result) Result time:  05/14/17 09:11:48    Final result by David Caal MD (05/14/17 09:11:48)    Impression:      Diffuse chronic interstitial prominence with improving right upper lobe infiltrate with a small amount of residual right upper lobe interstitial markings remaining from prior chest  x-ray.  Continued short-term followup recommended.      Electronically signed by: DAVID ODOM MD  Date:     05/14/17  Time:    09:11     Narrative:    EXAM: XR CHEST 1 VIEW    CLINICAL HISTORY: Shortness of breath.    COMPARISON STUDIES: December 22, 2016    FINDINGS:  The cardiomediastinal silhouette is within normal limits.  Diffuse mild interstitial prominence throughout the lungs with minimal asymmetric markings in the right upper lobe, though improved from prior exam.  Surgical plate proximal left humerus.             The EKG was ordered, reviewed, and independently interpreted by the ED provider.  Interpretation time: 0754  Rate: 132 BPM  Rhythm: sinus tachycardia  Interpretation: QRS, ST segment, and  T waves are all normal.. No STEMI.         The Emergency Provider reviewed the vital signs and test results, which are outlined above.    ED Discussion     9:25 AM: Re-evaluated pt. Pt still c/o of SOB, but reports that she is feeling improved.    11:11 AM: Discussed case with Jen Valdez NP ([facility]). Dr. Cooper agrees with current care and management of pt and accepts admission.   Admitting Service: Hospital medicine   Admitting Physician: Dr. Cooper  Admit to: In-Patient-Tele    11:31 AM: Re-evaluated pt. I have discussed test results, shared treatment plan, and the need for admission with patient and family at bedside. Pt and family express understanding at this time and agree with all information. All questions answered. Pt and family have no further questions or concerns at this time. Pt is ready for admit.      ED Medication(s):  Medications   vancomycin 1 g in dextrose 5 % 250 mL IVPB (ready to mix system) (1,000 mg Intravenous New Bag 5/14/17 1042)   albuterol sulfate nebulizer solution 2.5 mg (not administered)   albuterol-ipratropium 2.5mg-0.5mg/3mL nebulizer solution 3 mL (3 mLs Nebulization Given 5/14/17 4182)   methylPREDNISolone sodium succinate injection 125 mg (125 mg Intravenous  Given 5/14/17 0844)   ondansetron injection 4 mg (4 mg Intravenous Given 5/14/17 0844)   0.9%  NaCl infusion (0 mLs Intravenous Stopped 5/14/17 0956)   albuterol-ipratropium 2.5mg-0.5mg/3mL nebulizer solution 3 mL (3 mLs Nebulization Given 5/14/17 0849)   piperacillin-tazobactam 4.5 g in dextrose 5 % 100 mL IVPB (ready to mix system) (0 g Intravenous Stopped 5/14/17 1038)   omnipaque 350 iohexol 100 mL (100 mLs Intravenous Given 5/14/17 1005)   0.9%  NaCl infusion (1,000 mLs Intravenous New Bag 5/14/17 1111)       New Prescriptions    No medications on file             Medical Decision Making    Medical Decision Making:   Clinical Tests:   Lab Tests: Ordered and Reviewed  Radiological Study: Ordered and Reviewed  Medical Tests: Ordered and Reviewed           Scribe Attestation:   Scribe #1: I performed the above scribed service and the documentation accurately describes the services I performed. I attest to the accuracy of the note.    Attending:   Physician Attestation Statement for Scribe #1: I, Qasim Hernández MD, personally performed the services described in this documentation, as scribed by Corinne Mack, in my presence, and it is both accurate and complete.         Attending Attestation:         Attending Critical Care:   Critical Care Times:   Direct Patient Care (initial evaluation, reassessments, and time considering the case)................................................................15 minutes.   Additional History from reviewing old medical records or taking additional history from the family, EMS, PCP, etc.......................5 minutes.   Ordering, Reviewing, and Interpreting Diagnostic Studies...............................................................................................................5 minutes.    Documentation..................................................................................................................................................................................10 minutes.   ==============================================================  · Total Critical Care Time - exclusive of procedural time: 35 minutes.  ==============================================================  Critical care was necessary to treat or prevent imminent or life-threatening deterioration of the following conditions: COPD exacerbation.   The following critical care procedures were done by me (see procedure notes): pulse oximetry and blood draw for specimens (tx with oxygen, steroids, and buterol).   Critical care was time spent personally by me on the following activities: obtaining history from patient or relative, examination of patient, review of x-rays / CT sent with the patient, review of old charts, ordering lab, x-rays, and/or EKG, development of treatment plan with patient or relative, ordering and performing treatments and interventions, evaluation of patient's response to treatment, discussion with consultants, interpretation of cardiac measurements and re-evaluation of patient's conition.   Critical Care Condition: potentially life-threatening           Clinical Impression       ICD-10-CM ICD-9-CM   1. COPD exacerbation J44.1 491.21   2. SOB (shortness of breath) R06.02 786.05   3. Shortness of breath R06.02 786.05   4. Pneumonia of right lung due to infectious organism, unspecified part of lung J18.9 483.8   5. Respiratory distress R06.00 786.09       Disposition:   Disposition: Admitted  Condition: Simon Hernández MD  05/14/17 9866

## 2017-05-14 NOTE — PROGRESS NOTES
Pt arrived via stretcher.  Pt ambulated from stretcher to bed with standby assistance.  Pt very SOB with exertion.  Pt on 3L NC sats >95%.  Bedside report completed with YAHAIRA Gonzalez.  VSS see flow sheet.  Bolus restarted, tele monitor on reading -110.  Pt oriented to room, call light, hourly rounding and the need to call for assistance; room near nurses station, bed alarm on.  Will continue to monitor.

## 2017-05-14 NOTE — CONSULTS
Clinical Pharmacy: Vancomycin Consult Note    Pharmacy consulted to dose vancomycin by Jen Valdez NP  56 y/o female with PMH of COPD, anxiety, depression, Bipolar disorder    Indication: pneumonia  WBC = 13.03  Tmax = 99.5  Blood x 2 and respiratory cultures pending    IBW = 54.7 kg  ABW = 48.1 kg (less than ideal) --> use for dosing  SCr = 0.8, CrCl = 58 ml/min    Pt received 1gm (20 mg/kg LD) in ER @ 10:42  Pt will be continued on a dose of 750mg (15 mg/kg) every 18 hours  Trough due 5/15 @ 2200 before 3rd total dose  Goal trough: 15-20 mcg/ml    Thank you for allowing us to participate in this patient's care.  Katherine McArdle, Pharm.D. 5/14/2017 6:11 PM

## 2017-05-14 NOTE — ED NOTES
Dr. Hernández and Jen with [facility] aware of O2 saturation when patient on room air.

## 2017-05-14 NOTE — SUBJECTIVE & OBJECTIVE
Past Medical History:   Diagnosis Date    Anxiety     Bipolar disorder     COPD (chronic obstructive pulmonary disease)     Depression     Respiratory distress     SOB (shortness of breath)        Past Surgical History:   Procedure Laterality Date    COLONOSCOPY      TUBAL LIGATION         Review of patient's allergies indicates:   Allergen Reactions    Codeine Itching    Codeine phosphate      Itchy (skin)^       No current facility-administered medications on file prior to encounter.      Current Outpatient Prescriptions on File Prior to Encounter   Medication Sig    albuterol 90 mcg/actuation inhaler Inhale 2 puffs into the lungs every 6 (six) hours as needed for Wheezing. Dispense with spacer.    amitriptyline (ELAVIL) 50 MG tablet Take 1 tablet (50 mg total) by mouth every evening. (Patient taking differently: Take 25 mg by mouth 3 (three) times daily. )    lorazepam (ATIVAN) 0.5 MG tablet Take 0.5 mg by mouth every 6 (six) hours as needed for Anxiety.    montelukast (SINGULAIR) 10 mg tablet Take 10 mg by mouth every evening.    paroxetine (PAXIL) 20 MG tablet Take 20 mg by mouth every morning.    predniSONE (DELTASONE) 10 MG tablet Please take 20mg twice daily x 3 days, then take 10 mg twice daily x 2 days, then take 10 mg once daily x 2 days    ropinirole (REQUIP) 1 MG tablet Take 1 mg by mouth every evening.    b complex vitamins tablet Take 1 tablet by mouth once daily.    dicyclomine (BENTYL) 20 mg tablet Take 20 mg by mouth as needed.    docusate sodium (COLACE) 100 MG capsule Take 1 capsule (100 mg total) by mouth 2 (two) times daily.    fluoxetine (PROZAC) 20 MG capsule Take 20 mg by mouth once daily.    fluticasone (FLONASE) 50 mcg/actuation nasal spray 2 sprays by Each Nare route once daily.    hydrocodone-acetaminophen 10-325mg (NORCO)  mg Tab Take 1 tablet by mouth every 6 (six) hours as needed.    hydrOXYzine (ATARAX) 25 MG tablet Take 1 tablet (25 mg total) by mouth 4  (four) times daily as needed for Itching. (Patient taking differently: Take 25 mg by mouth 2 (two) times daily. )    LINACLOTIDE (LINZESS ORAL) Take by mouth daily as needed. IBS    lurasidone (LATUDA) 40 mg Tab tablet Take 80 mg by mouth every evening.     mometasone-formoterol (DULERA) 200-5 mcg/actuation inhaler Inhale 2 puffs into the lungs 2 (two) times daily.    nicotine (NICODERM CQ) 21 mg/24 hr Place 1 patch onto the skin once daily.    polyethylene glycol (GLYCOLAX) 17 gram PwPk Take 17 g by mouth 2 (two) times daily.     Family History     Problem Relation (Age of Onset)    Aneurysm Brother    COPD Mother    Cancer Father    No Known Problems Son, Son    Suicide Son        Social History Main Topics    Smoking status: Current Every Day Smoker     Packs/day: 0.50     Years: 35.00     Types: Cigarettes    Smokeless tobacco: Not on file      Comment: decreasing amount of cigarettes    Alcohol use Yes      Comment: occassionally    Drug use: No    Sexual activity: Not on file     Review of Systems   Constitutional: Positive for appetite change, diaphoresis and fever.   HENT: Negative.    Eyes: Negative for pain and redness.   Respiratory: Positive for cough, shortness of breath and wheezing.    Cardiovascular: Positive for palpitations. Negative for chest pain and leg swelling.   Gastrointestinal: Negative for abdominal pain, constipation, diarrhea, nausea and vomiting.   Genitourinary: Negative for difficulty urinating, dysuria, frequency and hematuria.   Musculoskeletal: Negative for arthralgias, back pain and myalgias.   Skin: Negative for pallor, rash and wound.   Neurological: Positive for dizziness, weakness and light-headedness. Negative for headaches.   Psychiatric/Behavioral: Negative for confusion.     Objective:     Vital Signs (Most Recent):  Temp: 99.5 °F (37.5 °C) (05/14/17 1114)  Pulse: (!) 112 (05/14/17 1301)  Resp: 20 (05/14/17 1301)  BP: 109/71 (05/14/17 1118)  SpO2: 98 % (05/14/17  1301) Vital Signs (24h Range):  Temp:  [99 °F (37.2 °C)-99.5 °F (37.5 °C)] 99.5 °F (37.5 °C)  Pulse:  [112-137] 112  Resp:  [16-30] 20  SpO2:  [87 %-100 %] 98 %  BP: (109-116)/(63-80) 109/71     Weight: 48.1 kg (106 lb)  Body mass index is 18.19 kg/(m^2).    Physical Exam   Constitutional: She is oriented to person, place, and time. She appears well-developed and well-nourished. No distress.   HENT:   Head: Normocephalic and atraumatic.   Eyes: Conjunctivae are normal. No scleral icterus.   Neck: Normal range of motion. Neck supple.   Cardiovascular: Regular rhythm and normal heart sounds.  Tachycardia present.  Exam reveals no gallop and no friction rub.    No murmur heard.  Pulmonary/Chest: She has decreased breath sounds in the right upper field, the right middle field, the right lower field, the left upper field, the left middle field and the left lower field.   Some pursed lip breathing    Decreased breath sounds with wheezing   Abdominal: Soft. Bowel sounds are normal.   Musculoskeletal: Normal range of motion. She exhibits no edema or tenderness.   Neurological: She is alert and oriented to person, place, and time.   Skin: Skin is warm and dry.   Psychiatric: She has a normal mood and affect. Her behavior is normal.   Nursing note and vitals reviewed.       Significant Labs:   CBC:   Recent Labs  Lab 05/14/17  0800   WBC 13.03*   HGB 14.1   HCT 43.4        CMP:   Recent Labs  Lab 05/14/17  0800   *   K 4.3   CL 97   CO2 24   *   BUN 11   CREATININE 0.8   CALCIUM 9.6   ANIONGAP 14   EGFRNONAA >60     All pertinent labs within the past 24 hours have been reviewed.    Significant Imaging: I have reviewed all pertinent imaging results/findings within the past 24 hours.

## 2017-05-15 LAB
ALBUMIN SERPL BCP-MCNC: 2.9 G/DL
ALLENS TEST: ABNORMAL
ALLENS TEST: ABNORMAL
ALP SERPL-CCNC: 83 U/L
ALT SERPL W/O P-5'-P-CCNC: 19 U/L
ANION GAP SERPL CALC-SCNC: 11 MMOL/L
AST SERPL-CCNC: 27 U/L
BASOPHILS # BLD AUTO: 0.01 K/UL
BASOPHILS NFR BLD: 0.1 %
BILIRUB SERPL-MCNC: 0.2 MG/DL
BUN SERPL-MCNC: 8 MG/DL
CALCIUM SERPL-MCNC: 9.1 MG/DL
CHLORIDE SERPL-SCNC: 107 MMOL/L
CO2 SERPL-SCNC: 24 MMOL/L
CREAT SERPL-MCNC: 0.7 MG/DL
DELSYS: ABNORMAL
DELSYS: ABNORMAL
DIFFERENTIAL METHOD: ABNORMAL
EOSINOPHIL # BLD AUTO: 0 K/UL
EOSINOPHIL NFR BLD: 0 %
ERYTHROCYTE [DISTWIDTH] IN BLOOD BY AUTOMATED COUNT: 14.7 %
EST. GFR  (AFRICAN AMERICAN): >60 ML/MIN/1.73 M^2
EST. GFR  (NON AFRICAN AMERICAN): >60 ML/MIN/1.73 M^2
FIO2: 32
FIO2: 32
FLOW: 3
FLOW: 3
GLUCOSE SERPL-MCNC: 177 MG/DL
HCO3 UR-SCNC: 21.7 MMOL/L (ref 24–28)
HCO3 UR-SCNC: 24.8 MMOL/L (ref 24–28)
HCT VFR BLD AUTO: 38.3 %
HGB BLD-MCNC: 12.4 G/DL
LACTATE SERPL-SCNC: 1.9 MMOL/L
LACTATE SERPL-SCNC: 2.6 MMOL/L
LACTATE SERPL-SCNC: 3.1 MMOL/L
LACTATE SERPL-SCNC: 3.3 MMOL/L
LACTATE SERPL-SCNC: 3.3 MMOL/L
LYMPHOCYTES # BLD AUTO: 0.6 K/UL
LYMPHOCYTES NFR BLD: 5 %
MCH RBC QN AUTO: 32.6 PG
MCHC RBC AUTO-ENTMCNC: 32.4 %
MCV RBC AUTO: 101 FL
MODE: ABNORMAL
MODE: ABNORMAL
MONOCYTES # BLD AUTO: 0.6 K/UL
MONOCYTES NFR BLD: 5.2 %
NEUTROPHILS # BLD AUTO: 11 K/UL
NEUTROPHILS NFR BLD: 89.7 %
PCO2 BLDA: 37.4 MMHG (ref 35–45)
PCO2 BLDA: 39.7 MMHG (ref 35–45)
PH SMN: 7.34 [PH] (ref 7.35–7.45)
PH SMN: 7.43 [PH] (ref 7.35–7.45)
PLATELET # BLD AUTO: 179 K/UL
PMV BLD AUTO: 11.3 FL
PO2 BLDA: 68 MMHG (ref 80–100)
PO2 BLDA: 80 MMHG (ref 80–100)
POC BE: -4 MMOL/L
POC BE: 0 MMOL/L
POC SATURATED O2: 94 % (ref 95–100)
POC SATURATED O2: 95 % (ref 95–100)
POTASSIUM SERPL-SCNC: 3.8 MMOL/L
PROT SERPL-MCNC: 6.6 G/DL
RBC # BLD AUTO: 3.8 M/UL
SAMPLE: ABNORMAL
SAMPLE: ABNORMAL
SITE: ABNORMAL
SITE: ABNORMAL
SODIUM SERPL-SCNC: 142 MMOL/L
VANCOMYCIN TROUGH SERPL-MCNC: 2.9 UG/ML
WBC # BLD AUTO: 12.21 K/UL

## 2017-05-15 PROCEDURE — 25000003 PHARM REV CODE 250: Performed by: NURSE PRACTITIONER

## 2017-05-15 PROCEDURE — 80053 COMPREHEN METABOLIC PANEL: CPT

## 2017-05-15 PROCEDURE — 36415 COLL VENOUS BLD VENIPUNCTURE: CPT

## 2017-05-15 PROCEDURE — 21400001 HC TELEMETRY ROOM

## 2017-05-15 PROCEDURE — 94640 AIRWAY INHALATION TREATMENT: CPT

## 2017-05-15 PROCEDURE — 80202 ASSAY OF VANCOMYCIN: CPT

## 2017-05-15 PROCEDURE — 27000221 HC OXYGEN, UP TO 24 HOURS

## 2017-05-15 PROCEDURE — 63600175 PHARM REV CODE 636 W HCPCS: Performed by: NURSE PRACTITIONER

## 2017-05-15 PROCEDURE — 25000003 PHARM REV CODE 250: Performed by: HOSPITALIST

## 2017-05-15 PROCEDURE — 63600175 PHARM REV CODE 636 W HCPCS: Performed by: HOSPITALIST

## 2017-05-15 PROCEDURE — 25000242 PHARM REV CODE 250 ALT 637 W/ HCPCS: Performed by: NURSE PRACTITIONER

## 2017-05-15 PROCEDURE — 25000003 PHARM REV CODE 250: Performed by: INTERNAL MEDICINE

## 2017-05-15 PROCEDURE — 85025 COMPLETE CBC W/AUTO DIFF WBC: CPT

## 2017-05-15 PROCEDURE — 83605 ASSAY OF LACTIC ACID: CPT | Mod: 91

## 2017-05-15 RX ADMIN — BENZONATATE 200 MG: 100 CAPSULE ORAL at 12:05

## 2017-05-15 RX ADMIN — LORAZEPAM 0.5 MG: 0.5 TABLET ORAL at 05:05

## 2017-05-15 RX ADMIN — LORAZEPAM 0.5 MG: 0.5 TABLET ORAL at 07:05

## 2017-05-15 RX ADMIN — PANTOPRAZOLE SODIUM 600 MG: 40 TABLET, DELAYED RELEASE ORAL at 08:05

## 2017-05-15 RX ADMIN — AMITRIPTYLINE HYDROCHLORIDE 25 MG: 25 TABLET, FILM COATED ORAL at 09:05

## 2017-05-15 RX ADMIN — CIPROFLOXACIN 400 MG: 2 INJECTION, SOLUTION INTRAVENOUS at 04:05

## 2017-05-15 RX ADMIN — PAROXETINE HYDROCHLORIDE 20 MG: 20 TABLET, FILM COATED ORAL at 06:05

## 2017-05-15 RX ADMIN — BUDESONIDE 0.5 MG: 0.5 SUSPENSION RESPIRATORY (INHALATION) at 07:05

## 2017-05-15 RX ADMIN — VANCOMYCIN HYDROCHLORIDE 750 MG: 1 INJECTION, POWDER, LYOPHILIZED, FOR SOLUTION INTRAVENOUS at 11:05

## 2017-05-15 RX ADMIN — MONTELUKAST SODIUM 10 MG: 10 TABLET, FILM COATED ORAL at 09:05

## 2017-05-15 RX ADMIN — METHYLPREDNISOLONE SODIUM SUCCINATE 40 MG: 40 INJECTION, POWDER, FOR SOLUTION INTRAMUSCULAR; INTRAVENOUS at 05:05

## 2017-05-15 RX ADMIN — AMITRIPTYLINE HYDROCHLORIDE 25 MG: 25 TABLET, FILM COATED ORAL at 01:05

## 2017-05-15 RX ADMIN — PIPERACILLIN AND TAZOBACTAM 4.5 G: 4; .5 INJECTION, POWDER, LYOPHILIZED, FOR SOLUTION INTRAVENOUS; PARENTERAL at 06:05

## 2017-05-15 RX ADMIN — LORAZEPAM 0.5 MG: 0.5 TABLET ORAL at 12:05

## 2017-05-15 RX ADMIN — PIPERACILLIN AND TAZOBACTAM 4.5 G: 4; .5 INJECTION, POWDER, LYOPHILIZED, FOR SOLUTION INTRAVENOUS; PARENTERAL at 12:05

## 2017-05-15 RX ADMIN — IPRATROPIUM BROMIDE AND ALBUTEROL SULFATE 3 ML: .5; 3 SOLUTION RESPIRATORY (INHALATION) at 12:05

## 2017-05-15 RX ADMIN — ROPINIROLE HYDROCHLORIDE 1 MG: 1 TABLET, FILM COATED ORAL at 09:05

## 2017-05-15 RX ADMIN — PIPERACILLIN AND TAZOBACTAM 4.5 G: 4; .5 INJECTION, POWDER, LYOPHILIZED, FOR SOLUTION INTRAVENOUS; PARENTERAL at 03:05

## 2017-05-15 RX ADMIN — FAMOTIDINE 20 MG: 20 TABLET ORAL at 09:05

## 2017-05-15 RX ADMIN — ARFORMOTEROL TARTRATE 15 MCG: 15 SOLUTION RESPIRATORY (INHALATION) at 08:05

## 2017-05-15 RX ADMIN — METHYLPREDNISOLONE SODIUM SUCCINATE 40 MG: 40 INJECTION, POWDER, FOR SOLUTION INTRAMUSCULAR; INTRAVENOUS at 12:05

## 2017-05-15 RX ADMIN — GUAIFENESIN AND DEXTROMETHORPHAN HYDROBROMIDE 1 TABLET: 600; 30 TABLET, EXTENDED RELEASE ORAL at 04:05

## 2017-05-15 RX ADMIN — NICOTINE 1 PATCH: 21 PATCH, EXTENDED RELEASE TRANSDERMAL at 04:05

## 2017-05-15 RX ADMIN — CIPROFLOXACIN 400 MG: 2 INJECTION, SOLUTION INTRAVENOUS at 03:05

## 2017-05-15 RX ADMIN — IPRATROPIUM BROMIDE AND ALBUTEROL SULFATE 3 ML: .5; 3 SOLUTION RESPIRATORY (INHALATION) at 08:05

## 2017-05-15 RX ADMIN — IPRATROPIUM BROMIDE AND ALBUTEROL SULFATE 3 ML: .5; 3 SOLUTION RESPIRATORY (INHALATION) at 07:05

## 2017-05-15 RX ADMIN — VANCOMYCIN HYDROCHLORIDE 750 MG: 1 INJECTION, POWDER, LYOPHILIZED, FOR SOLUTION INTRAVENOUS at 05:05

## 2017-05-15 RX ADMIN — FLUOXETINE 20 MG: 20 CAPSULE ORAL at 08:05

## 2017-05-15 RX ADMIN — AMITRIPTYLINE HYDROCHLORIDE 25 MG: 25 TABLET, FILM COATED ORAL at 05:05

## 2017-05-15 RX ADMIN — FAMOTIDINE 20 MG: 20 TABLET ORAL at 08:05

## 2017-05-15 RX ADMIN — ARFORMOTEROL TARTRATE 15 MCG: 15 SOLUTION RESPIRATORY (INHALATION) at 07:05

## 2017-05-15 RX ADMIN — BUDESONIDE 0.5 MG: 0.5 SUSPENSION RESPIRATORY (INHALATION) at 08:05

## 2017-05-15 RX ADMIN — ENOXAPARIN SODIUM 40 MG: 100 INJECTION SUBCUTANEOUS at 04:05

## 2017-05-15 NOTE — PLAN OF CARE
CM met with patient at the bedside to assess for discharge needs.  Patient states she uses home oxygen and has a nebulizer.  She does not use any equipment with ambulation or use home health services.  She does not anticipate any discharge needs.      05/15/17 0924   Discharge Assessment   Assessment Type Discharge Planning Assessment   Confirmed/corrected address and phone number on facesheet? Yes   Assessment information obtained from? Patient;Medical Record   Expected Length of Stay (days) (TBD)   Communicated expected length of stay with patient/caregiver yes   Prior to hospitilization cognitive status: Alert/Oriented   Prior to hospitalization functional status: Independent   Current cognitive status: Alert/Oriented   Current Functional Status: Independent   Arrived From admitted as an inpatient;home or self-care   Lives With spouse   Able to Return to Prior Arrangements yes   Is patient able to care for self after discharge? Yes   How many people do you have in your home that can help with your care after discharge? 1   Who are your caregiver(s) and their phone number(s)? danielle Fernandez, spouse [phone]   Patient's perception of discharge disposition admitted as an inpatient;home or selfcare   Readmission Within The Last 30 Days no previous admission in last 30 days   Patient currently being followed by outpatient case management? No   Patient currently receives home health services? No   Does the patient currently use HME? Yes   Name and contact number for HME provider: [facility]   Patient currently receives private duty nursing? No   Patient currently receives any other outside agency services? No   Equipment Currently Used at Home oxygen;other (see comments)  (nebulizer)   Do you have any problems affording any of your prescribed medications? No   Is the patient taking medications as prescribed? yes   Do you have any financial concerns preventing you from receiving the healthcare you need? No   Does the  patient have transportation to healthcare appointments? Yes   Transportation Available car;family or friend will provide   On Dialysis? No   Does the patient receive services at the [facility]? No   Are there any open cases? No   Discharge Plan A Home with family   Discharge Plan B Home with family   Patient/Family In Agreement With Plan yes

## 2017-05-15 NOTE — PLAN OF CARE
Problem: Patient Care Overview  Goal: Plan of Care Review  Outcome: Ongoing (interventions implemented as appropriate)  Plan of care reviewed with patient. AAO x3. V/S stable. Patient complaining of 0/10 pain at this time. Patient on telemetry ST rhythm noted. On 2 LNC. Patient ambulating with assistance, fall precautions in place, patient free from fall/injury. Patient has no questions at this time. Will continue to monitor.

## 2017-05-15 NOTE — PLAN OF CARE
Problem: Patient Care Overview  Goal: Plan of Care Review  Outcome: Ongoing (interventions implemented as appropriate)  Patient AAOx4.  At times patient becomes tachypeneic but after she calms down her breathing becomes controlled.  PRN ativan administered as ordered.  Scheduled breathing treatments administered. IV abx administered as ordered.   No complaints of pain.  No falls on shift.  Ruby encouraged

## 2017-05-16 LAB
ALLENS TEST: ABNORMAL
BACTERIA SPEC AEROBE CULT: NORMAL
BASOPHILS # BLD AUTO: 0.01 K/UL
BASOPHILS NFR BLD: 0.1 %
DELSYS: ABNORMAL
DIFFERENTIAL METHOD: ABNORMAL
EOSINOPHIL # BLD AUTO: 0 K/UL
EOSINOPHIL NFR BLD: 0 %
ERYTHROCYTE [DISTWIDTH] IN BLOOD BY AUTOMATED COUNT: 14.6 %
FIO2: 28
FLOW: 2
GRAM STN SPEC: NORMAL
HCO3 UR-SCNC: 27.1 MMOL/L (ref 24–28)
HCT VFR BLD AUTO: 35.2 %
HGB BLD-MCNC: 11.5 G/DL
LACTATE SERPL-SCNC: 3 MMOL/L
LYMPHOCYTES # BLD AUTO: 0.6 K/UL
LYMPHOCYTES NFR BLD: 5.2 %
MCH RBC QN AUTO: 32.4 PG
MCHC RBC AUTO-ENTMCNC: 32.7 %
MCV RBC AUTO: 99 FL
MODE: ABNORMAL
MONOCYTES # BLD AUTO: 0.9 K/UL
MONOCYTES NFR BLD: 7.9 %
NEUTROPHILS # BLD AUTO: 10 K/UL
NEUTROPHILS NFR BLD: 86.8 %
PCO2 BLDA: 41.9 MMHG (ref 35–45)
PH SMN: 7.42 [PH] (ref 7.35–7.45)
PLATELET # BLD AUTO: 187 K/UL
PMV BLD AUTO: 11 FL
PO2 BLDA: 69 MMHG (ref 80–100)
POC BE: 3 MMOL/L
POC SATURATED O2: 94 % (ref 95–100)
RBC # BLD AUTO: 3.55 M/UL
SAMPLE: ABNORMAL
SITE: ABNORMAL
WBC # BLD AUTO: 11.51 K/UL

## 2017-05-16 PROCEDURE — 85025 COMPLETE CBC W/AUTO DIFF WBC: CPT

## 2017-05-16 PROCEDURE — 25000003 PHARM REV CODE 250: Performed by: NURSE PRACTITIONER

## 2017-05-16 PROCEDURE — 63600175 PHARM REV CODE 636 W HCPCS: Performed by: HOSPITALIST

## 2017-05-16 PROCEDURE — 63600175 PHARM REV CODE 636 W HCPCS: Performed by: INTERNAL MEDICINE

## 2017-05-16 PROCEDURE — 99900035 HC TECH TIME PER 15 MIN (STAT)

## 2017-05-16 PROCEDURE — 36415 COLL VENOUS BLD VENIPUNCTURE: CPT

## 2017-05-16 PROCEDURE — 21400001 HC TELEMETRY ROOM

## 2017-05-16 PROCEDURE — 36600 WITHDRAWAL OF ARTERIAL BLOOD: CPT

## 2017-05-16 PROCEDURE — 27000221 HC OXYGEN, UP TO 24 HOURS

## 2017-05-16 PROCEDURE — 63600175 PHARM REV CODE 636 W HCPCS: Performed by: NURSE PRACTITIONER

## 2017-05-16 PROCEDURE — 25000003 PHARM REV CODE 250: Performed by: INTERNAL MEDICINE

## 2017-05-16 PROCEDURE — 25000003 PHARM REV CODE 250: Performed by: HOSPITALIST

## 2017-05-16 PROCEDURE — 82803 BLOOD GASES ANY COMBINATION: CPT

## 2017-05-16 PROCEDURE — 25000242 PHARM REV CODE 250 ALT 637 W/ HCPCS: Performed by: NURSE PRACTITIONER

## 2017-05-16 PROCEDURE — 83605 ASSAY OF LACTIC ACID: CPT

## 2017-05-16 PROCEDURE — 94640 AIRWAY INHALATION TREATMENT: CPT

## 2017-05-16 RX ORDER — LORAZEPAM 2 MG/ML
1 INJECTION INTRAMUSCULAR EVERY 4 HOURS PRN
Status: DISCONTINUED | OUTPATIENT
Start: 2017-05-16 | End: 2017-05-18

## 2017-05-16 RX ORDER — HALOPERIDOL 5 MG/ML
5 INJECTION INTRAMUSCULAR EVERY 6 HOURS PRN
Status: DISCONTINUED | OUTPATIENT
Start: 2017-05-16 | End: 2017-05-16

## 2017-05-16 RX ORDER — LORAZEPAM 2 MG/ML
2 INJECTION INTRAMUSCULAR ONCE
Status: COMPLETED | OUTPATIENT
Start: 2017-05-16 | End: 2017-05-16

## 2017-05-16 RX ORDER — HALOPERIDOL 5 MG/ML
2 INJECTION INTRAMUSCULAR EVERY 6 HOURS PRN
Status: DISCONTINUED | OUTPATIENT
Start: 2017-05-16 | End: 2017-05-17

## 2017-05-16 RX ADMIN — HALOPERIDOL LACTATE 5 MG: 5 INJECTION, SOLUTION INTRAMUSCULAR at 05:05

## 2017-05-16 RX ADMIN — IPRATROPIUM BROMIDE AND ALBUTEROL SULFATE 3 ML: .5; 3 SOLUTION RESPIRATORY (INHALATION) at 07:05

## 2017-05-16 RX ADMIN — GUAIFENESIN AND DEXTROMETHORPHAN HYDROBROMIDE 1 TABLET: 600; 30 TABLET, EXTENDED RELEASE ORAL at 09:05

## 2017-05-16 RX ADMIN — LORAZEPAM 2 MG: 2 INJECTION INTRAMUSCULAR; INTRAVENOUS at 05:05

## 2017-05-16 RX ADMIN — METHYLPREDNISOLONE SODIUM SUCCINATE 40 MG: 40 INJECTION, POWDER, FOR SOLUTION INTRAMUSCULAR; INTRAVENOUS at 12:05

## 2017-05-16 RX ADMIN — LURASIDONE HYDROCHLORIDE 80 MG: 40 TABLET, FILM COATED ORAL at 09:05

## 2017-05-16 RX ADMIN — CIPROFLOXACIN 400 MG: 2 INJECTION, SOLUTION INTRAVENOUS at 05:05

## 2017-05-16 RX ADMIN — LORAZEPAM 0.5 MG: 0.5 TABLET ORAL at 02:05

## 2017-05-16 RX ADMIN — NICOTINE 1 PATCH: 21 PATCH, EXTENDED RELEASE TRANSDERMAL at 05:05

## 2017-05-16 RX ADMIN — BENZONATATE 200 MG: 100 CAPSULE ORAL at 09:05

## 2017-05-16 RX ADMIN — AMITRIPTYLINE HYDROCHLORIDE 25 MG: 25 TABLET, FILM COATED ORAL at 05:05

## 2017-05-16 RX ADMIN — FAMOTIDINE 20 MG: 20 TABLET ORAL at 09:05

## 2017-05-16 RX ADMIN — PIPERACILLIN AND TAZOBACTAM 4.5 G: 4; .5 INJECTION, POWDER, LYOPHILIZED, FOR SOLUTION INTRAVENOUS; PARENTERAL at 07:05

## 2017-05-16 RX ADMIN — IPRATROPIUM BROMIDE AND ALBUTEROL SULFATE 3 ML: .5; 3 SOLUTION RESPIRATORY (INHALATION) at 01:05

## 2017-05-16 RX ADMIN — BUDESONIDE 0.5 MG: 0.5 SUSPENSION RESPIRATORY (INHALATION) at 07:05

## 2017-05-16 RX ADMIN — METHYLPREDNISOLONE SODIUM SUCCINATE 40 MG: 40 INJECTION, POWDER, FOR SOLUTION INTRAMUSCULAR; INTRAVENOUS at 05:05

## 2017-05-16 RX ADMIN — PIPERACILLIN AND TAZOBACTAM 4.5 G: 4; .5 INJECTION, POWDER, LYOPHILIZED, FOR SOLUTION INTRAVENOUS; PARENTERAL at 10:05

## 2017-05-16 RX ADMIN — IPRATROPIUM BROMIDE AND ALBUTEROL SULFATE 3 ML: .5; 3 SOLUTION RESPIRATORY (INHALATION) at 12:05

## 2017-05-16 RX ADMIN — METHYLPREDNISOLONE SODIUM SUCCINATE 40 MG: 40 INJECTION, POWDER, FOR SOLUTION INTRAMUSCULAR; INTRAVENOUS at 01:05

## 2017-05-16 RX ADMIN — MONTELUKAST SODIUM 10 MG: 10 TABLET, FILM COATED ORAL at 09:05

## 2017-05-16 RX ADMIN — VANCOMYCIN HYDROCHLORIDE 750 MG: 1 INJECTION, POWDER, LYOPHILIZED, FOR SOLUTION INTRAVENOUS at 02:05

## 2017-05-16 RX ADMIN — ENOXAPARIN SODIUM 40 MG: 100 INJECTION SUBCUTANEOUS at 05:05

## 2017-05-16 RX ADMIN — AMITRIPTYLINE HYDROCHLORIDE 25 MG: 25 TABLET, FILM COATED ORAL at 09:05

## 2017-05-16 RX ADMIN — ROPINIROLE HYDROCHLORIDE 1 MG: 1 TABLET, FILM COATED ORAL at 09:05

## 2017-05-16 RX ADMIN — PIPERACILLIN AND TAZOBACTAM 4.5 G: 4; .5 INJECTION, POWDER, LYOPHILIZED, FOR SOLUTION INTRAVENOUS; PARENTERAL at 03:05

## 2017-05-16 RX ADMIN — CIPROFLOXACIN 400 MG: 2 INJECTION, SOLUTION INTRAVENOUS at 04:05

## 2017-05-16 RX ADMIN — METHYLPREDNISOLONE SODIUM SUCCINATE 40 MG: 40 INJECTION, POWDER, FOR SOLUTION INTRAMUSCULAR; INTRAVENOUS at 09:05

## 2017-05-16 RX ADMIN — ARFORMOTEROL TARTRATE 15 MCG: 15 SOLUTION RESPIRATORY (INHALATION) at 07:05

## 2017-05-16 NOTE — PLAN OF CARE
Problem: Patient Care Overview  Goal: Plan of Care Review  Pt respiratory effort remained mildly labored over night. Nebs and medications administered as ordered. Spo2=95 or greater on 2 liters nasal cannula. Frequent non productive coughing noted. Denies any pain. No falls or injury this shift. Pt reminded to call for assistance with toileting, pt verbalized understanding. Bed low, call bell within reach. Door open for observation.

## 2017-05-16 NOTE — PROGRESS NOTES
"Pt hallucinating, popped IV line, and was attempting to leave the room with sitter present.Pt with tremors, tachycardic and agitated. Pt suspicious of staff and states," I don't trust y'all, you're trying to do something to me." Pt reports drinking 2 glasses of wine daily and also spoke of enjoying a lot of jello shots. Over this shift, she spoke of needing to find another doctor because her last found alcohol in her system and did not want to give her "pills". MD called to notify of pt's current status. MD ordered 2 mg Ativan.  "

## 2017-05-16 NOTE — PROGRESS NOTES
[facility] Hospital Medicine  Progress Note    Patient Name: Jeniffer Fernandez  MRN: [id number]  Patient Class: IP- Inpatient   Admission Date: 5/14/2017  Length of Stay: 1 days  Attending Physician: Lewis Suarez MD  Primary Care Provider: Ben Pelaez Jr, MD        Subjective:     Principal Problem:Acute on chronic respiratory failure with hypoxia    HPI:  Jeniffer Fernandez is a 58 yo female + smoker with COPD and chronic respiratory failure who presented with worsening SOB over 1 week. For 5 days she has worn oxygen continuously. She describes frequent productive cough, wheezing, SOB, generalized weakness and FRANCES. Last night she had subjective fever and sweats. Pt has had several neb treatments and still having SOB. In the ED, ABG reflects acute on chronic hypoxic respiratory failure and O2 sat 87 %. CTA of Chest indicated a patchy RUL/RLL interstitial infiltrate.     Hospital Course:       Interval History:  Severe coughing spells with shortness of breath.  Occasionally cough is productive.    Review of Systems   Constitutional: Negative for chills and fever.   HENT: Negative for congestion and sore throat.    Eyes: Negative for visual disturbance.   Respiratory: Positive for cough, shortness of breath and wheezing.    Cardiovascular: Negative for chest pain, palpitations and leg swelling.   Gastrointestinal: Negative for abdominal pain, blood in stool, constipation, diarrhea, nausea and vomiting.   Genitourinary: Negative for dysuria and hematuria.   Musculoskeletal: Negative for arthralgias and back pain.   Skin: Negative for rash and wound.   Neurological: Negative for dizziness, weakness, light-headedness and numbness.   Hematological: Negative for adenopathy.     Objective:     Vital Signs (Most Recent):  Temp: 97.5 °F (36.4 °C) (05/15/17 1952)  Pulse: (!) 115 (05/15/17 2007)  Resp: (!) 22 (05/15/17 2007)  BP: (!) 156/84 (05/15/17 1952)  SpO2: 96 % (05/15/17 2002) Vital Signs (24h  Range):  Temp:  [97.5 °F (36.4 °C)-98.7 °F (37.1 °C)] 97.5 °F (36.4 °C)  Pulse:  [] 115  Resp:  [20-30] 22  SpO2:  [93 %-100 %] 96 %  BP: (100-157)/(59-91) 156/84     Weight: 48.1 kg (106 lb)  Body mass index is 18.19 kg/(m^2).    Intake/Output Summary (Last 24 hours) at 05/15/17 2224  Last data filed at 05/15/17 1800   Gross per 24 hour   Intake             2310 ml   Output              600 ml   Net             1710 ml      Physical Exam   Constitutional: She is oriented to person, place, and time. She appears well-developed and well-nourished. No distress.   HENT:   Head: Normocephalic and atraumatic.   Mouth/Throat: Oropharynx is clear and moist.   Eyes: Conjunctivae and EOM are normal. Pupils are equal, round, and reactive to light.   Neck: Neck supple. No JVD present. No thyromegaly present.   Cardiovascular: Normal rate and regular rhythm.  Exam reveals no gallop and no friction rub.    No murmur heard.  Pulmonary/Chest: She has wheezes. She has no rales.   Coarse crackles bilaterally.  Expiratory wheezes and generally reduced breath sounds.   Abdominal: Soft. Bowel sounds are normal. She exhibits no distension. There is no tenderness. There is no rebound and no guarding.   Musculoskeletal: Normal range of motion. She exhibits no edema or deformity.   Lymphadenopathy:     She has no cervical adenopathy.   Neurological: She is alert and oriented to person, place, and time. She has normal reflexes.   Skin: Skin is warm and dry. No rash noted.   Psychiatric: She has a normal mood and affect. Her behavior is normal. Judgment and thought content normal.   Nursing note and vitals reviewed.      Significant Labs:   CBC:   Recent Labs  Lab 05/14/17  0800 05/15/17  0604   WBC 13.03* 12.21   HGB 14.1 12.4   HCT 43.4 38.3    179     CMP:   Recent Labs  Lab 05/14/17  0800 05/15/17  0604   * 142   K 4.3 3.8   CL 97 107   CO2 24 24   * 177*   BUN 11 8   CREATININE 0.8 0.7   CALCIUM 9.6 9.1   PROT   --  6.6   ALBUMIN  --  2.9*   BILITOT  --  0.2   ALKPHOS  --  83   AST  --  27   ALT  --  19   ANIONGAP 14 11   EGFRNONAA >60 >60       Significant Imaging: I have reviewed all pertinent imaging results/findings within the past 24 hours.    Assessment/Plan:      * Acute on chronic respiratory failure with hypoxia  Supplemental oxygen to maintain sat > 92 % - wean as approximate    Anxiety  Resume home med    Bipolar disorder  Resume home med    Tobacco abuse  Encouraged smoking cessation  Denies need for Nicotine patch    COPD exacerbation  Supplemental oxygen  Xopenex  IV Corticosteroids    Pneumonia involving right lung  Triple ABX - deescalate when appropriate  Supplemental oxygen  Respiratory culture    VTE Risk Mitigation         Ordered     enoxaparin injection 40 mg  Daily     Route:  Subcutaneous        05/14/17 1512     Medium Risk of VTE  Once      05/14/17 1132     Place sequential compression device  Until discontinued      05/14/17 1132          Lewis Suarez MD  Department of Hospital Medicine   [facility]

## 2017-05-16 NOTE — PLAN OF CARE
Problem: Patient Care Overview  Goal: Plan of Care Review  Outcome: Ongoing (interventions implemented as appropriate)  Pt has been free of falls. Pt has been arousal to pain for the day. PIV intact. Pt is on 2L NC. Call light in reach and hourly rounding made. Sitter at bedside. Pt does not seem to be in pain.

## 2017-05-16 NOTE — PLAN OF CARE
Problem: Patient Care Overview  Goal: Plan of Care Review  Outcome: Ongoing (interventions implemented as appropriate)  Patient resting on NC 2L and tolerating mask neb txs at this time.

## 2017-05-16 NOTE — PLAN OF CARE
Problem: Patient Care Overview  Goal: Plan of Care Review  Pt coughing with labored breathing with first tx. Neb seemed to help. Pt states she prefers mask tx.

## 2017-05-16 NOTE — NURSING
Called Dr. Boo to inform him that patient become increasingly confused. She is trying to get out of bed and hallucinating. Requested him to reassess patient and treatment plan.  Ordered 5mg of Haldol IV. Patient is continuing to get out of bed.

## 2017-05-16 NOTE — SUBJECTIVE & OBJECTIVE
Interval History:  Severe coughing spells with shortness of breath.  Occasionally cough is productive.    Review of Systems   Constitutional: Negative for chills and fever.   HENT: Negative for congestion and sore throat.    Eyes: Negative for visual disturbance.   Respiratory: Positive for cough, shortness of breath and wheezing.    Cardiovascular: Negative for chest pain, palpitations and leg swelling.   Gastrointestinal: Negative for abdominal pain, blood in stool, constipation, diarrhea, nausea and vomiting.   Genitourinary: Negative for dysuria and hematuria.   Musculoskeletal: Negative for arthralgias and back pain.   Skin: Negative for rash and wound.   Neurological: Negative for dizziness, weakness, light-headedness and numbness.   Hematological: Negative for adenopathy.     Objective:     Vital Signs (Most Recent):  Temp: 97.5 °F (36.4 °C) (05/15/17 1952)  Pulse: (!) 115 (05/15/17 2007)  Resp: (!) 22 (05/15/17 2007)  BP: (!) 156/84 (05/15/17 1952)  SpO2: 96 % (05/15/17 2002) Vital Signs (24h Range):  Temp:  [97.5 °F (36.4 °C)-98.7 °F (37.1 °C)] 97.5 °F (36.4 °C)  Pulse:  [] 115  Resp:  [20-30] 22  SpO2:  [93 %-100 %] 96 %  BP: (100-157)/(59-91) 156/84     Weight: 48.1 kg (106 lb)  Body mass index is 18.19 kg/(m^2).    Intake/Output Summary (Last 24 hours) at 05/15/17 2224  Last data filed at 05/15/17 1800   Gross per 24 hour   Intake             2310 ml   Output              600 ml   Net             1710 ml      Physical Exam   Constitutional: She is oriented to person, place, and time. She appears well-developed and well-nourished. No distress.   HENT:   Head: Normocephalic and atraumatic.   Mouth/Throat: Oropharynx is clear and moist.   Eyes: Conjunctivae and EOM are normal. Pupils are equal, round, and reactive to light.   Neck: Neck supple. No JVD present. No thyromegaly present.   Cardiovascular: Normal rate and regular rhythm.  Exam reveals no gallop and no friction rub.    No murmur  heard.  Pulmonary/Chest: She has wheezes. She has no rales.   Coarse crackles bilaterally.  Expiratory wheezes and generally reduced breath sounds.   Abdominal: Soft. Bowel sounds are normal. She exhibits no distension. There is no tenderness. There is no rebound and no guarding.   Musculoskeletal: Normal range of motion. She exhibits no edema or deformity.   Lymphadenopathy:     She has no cervical adenopathy.   Neurological: She is alert and oriented to person, place, and time. She has normal reflexes.   Skin: Skin is warm and dry. No rash noted.   Psychiatric: She has a normal mood and affect. Her behavior is normal. Judgment and thought content normal.   Nursing note and vitals reviewed.      Significant Labs:   CBC:   Recent Labs  Lab 05/14/17  0800 05/15/17  0604   WBC 13.03* 12.21   HGB 14.1 12.4   HCT 43.4 38.3    179     CMP:   Recent Labs  Lab 05/14/17  0800 05/15/17  0604   * 142   K 4.3 3.8   CL 97 107   CO2 24 24   * 177*   BUN 11 8   CREATININE 0.8 0.7   CALCIUM 9.6 9.1   PROT  --  6.6   ALBUMIN  --  2.9*   BILITOT  --  0.2   ALKPHOS  --  83   AST  --  27   ALT  --  19   ANIONGAP 14 11   EGFRNONAA >60 >60       Significant Imaging: I have reviewed all pertinent imaging results/findings within the past 24 hours.

## 2017-05-16 NOTE — PROGRESS NOTES
Pt is unable to be arousal enough for me to feel comfortable to give her meds this morning. Daniela is aware.

## 2017-05-16 NOTE — PROGRESS NOTES
Pt restless and anxious, IV x 2 pulled out. Pt down the hallway without o2 in place, SOB. Pt states people were fighting. Pt assisted back to room, reoriented, and placed back on o2 at 3 liters. Bed alarm set. Spo2 =96%. Pt had home inhalers x 3 in purse along with Ativan. Pt witnessed taking 1 puff of one of the inhalers by staff. Pt educated on importance of not taking home medications, and that we are giving her all medications needed. Pt denies taking anything. Nurse also explained that all home medications would be stored safe during her hospital stay and would be returned upon discharge. Pt agreed. Medications collected and secured. ABGs ordered at this time and collected. IV x 2 replaced. Nursing staff in room at this time as sitter temporarily. Bed low, side rails up x 2. Door also open for close observation.

## 2017-05-17 PROBLEM — F05 DELIRIUM DUE TO ANOTHER MEDICAL CONDITION, ACUTE, HYPERACTIVE: Status: ACTIVE | Noted: 2017-05-17

## 2017-05-17 LAB
ALBUMIN SERPL BCP-MCNC: 3 G/DL
ALLENS TEST: ABNORMAL
ALLENS TEST: ABNORMAL
ANION GAP SERPL CALC-SCNC: 12 MMOL/L
BASOPHILS # BLD AUTO: 0.01 K/UL
BASOPHILS NFR BLD: 0.1 %
BUN SERPL-MCNC: 13 MG/DL
CALCIUM SERPL-MCNC: 9 MG/DL
CHLORIDE SERPL-SCNC: 103 MMOL/L
CO2 SERPL-SCNC: 26 MMOL/L
CREAT SERPL-MCNC: 0.7 MG/DL
DELSYS: ABNORMAL
DELSYS: ABNORMAL
DIFFERENTIAL METHOD: ABNORMAL
EOSINOPHIL # BLD AUTO: 0 K/UL
EOSINOPHIL NFR BLD: 0 %
ERYTHROCYTE [DISTWIDTH] IN BLOOD BY AUTOMATED COUNT: 14.6 %
EST. GFR  (AFRICAN AMERICAN): >60 ML/MIN/1.73 M^2
EST. GFR  (NON AFRICAN AMERICAN): >60 ML/MIN/1.73 M^2
FIO2: 40
FLOW: 4
FLOW: 4
GLUCOSE SERPL-MCNC: 149 MG/DL
HCO3 UR-SCNC: 28.4 MMOL/L (ref 24–28)
HCO3 UR-SCNC: 30.3 MMOL/L (ref 24–28)
HCT VFR BLD AUTO: 36.7 %
HGB BLD-MCNC: 11.9 G/DL
LYMPHOCYTES # BLD AUTO: 0.8 K/UL
LYMPHOCYTES NFR BLD: 8.3 %
MCH RBC QN AUTO: 32.3 PG
MCHC RBC AUTO-ENTMCNC: 32.4 %
MCV RBC AUTO: 100 FL
MODE: ABNORMAL
MODE: ABNORMAL
MONOCYTES # BLD AUTO: 0.9 K/UL
MONOCYTES NFR BLD: 10.4 %
NEUTROPHILS # BLD AUTO: 7.3 K/UL
NEUTROPHILS NFR BLD: 82.1 %
PCO2 BLDA: 49.5 MMHG (ref 35–45)
PCO2 BLDA: 50.8 MMHG (ref 35–45)
PH SMN: 7.37 [PH] (ref 7.35–7.45)
PH SMN: 7.38 [PH] (ref 7.35–7.45)
PHOSPHATE SERPL-MCNC: 2.4 MG/DL
PLATELET # BLD AUTO: 212 K/UL
PMV BLD AUTO: 11.1 FL
PO2 BLDA: 102 MMHG (ref 80–100)
PO2 BLDA: 96 MMHG (ref 80–100)
POC BE: 3 MMOL/L
POC BE: 5 MMOL/L
POC SATURATED O2: 97 % (ref 95–100)
POC SATURATED O2: 98 % (ref 95–100)
POTASSIUM SERPL-SCNC: 3.9 MMOL/L
RBC # BLD AUTO: 3.68 M/UL
SAMPLE: ABNORMAL
SAMPLE: ABNORMAL
SITE: ABNORMAL
SITE: ABNORMAL
SODIUM SERPL-SCNC: 141 MMOL/L
VANCOMYCIN TROUGH SERPL-MCNC: 3.3 UG/ML
WBC # BLD AUTO: 9.02 K/UL

## 2017-05-17 PROCEDURE — 99900035 HC TECH TIME PER 15 MIN (STAT)

## 2017-05-17 PROCEDURE — 80069 RENAL FUNCTION PANEL: CPT

## 2017-05-17 PROCEDURE — 63600175 PHARM REV CODE 636 W HCPCS: Performed by: HOSPITALIST

## 2017-05-17 PROCEDURE — 63600175 PHARM REV CODE 636 W HCPCS: Performed by: INTERNAL MEDICINE

## 2017-05-17 PROCEDURE — 85025 COMPLETE CBC W/AUTO DIFF WBC: CPT

## 2017-05-17 PROCEDURE — 25000003 PHARM REV CODE 250: Performed by: HOSPITALIST

## 2017-05-17 PROCEDURE — 36415 COLL VENOUS BLD VENIPUNCTURE: CPT

## 2017-05-17 PROCEDURE — 36600 WITHDRAWAL OF ARTERIAL BLOOD: CPT

## 2017-05-17 PROCEDURE — 25000003 PHARM REV CODE 250: Performed by: INTERNAL MEDICINE

## 2017-05-17 PROCEDURE — 94640 AIRWAY INHALATION TREATMENT: CPT

## 2017-05-17 PROCEDURE — 20000000 HC ICU ROOM

## 2017-05-17 PROCEDURE — 25000003 PHARM REV CODE 250: Performed by: NURSE PRACTITIONER

## 2017-05-17 PROCEDURE — 80202 ASSAY OF VANCOMYCIN: CPT

## 2017-05-17 PROCEDURE — 99291 CRITICAL CARE FIRST HOUR: CPT | Mod: ,,, | Performed by: INTERNAL MEDICINE

## 2017-05-17 PROCEDURE — 82803 BLOOD GASES ANY COMBINATION: CPT

## 2017-05-17 PROCEDURE — 27000221 HC OXYGEN, UP TO 24 HOURS

## 2017-05-17 PROCEDURE — 63600175 PHARM REV CODE 636 W HCPCS: Performed by: NURSE PRACTITIONER

## 2017-05-17 PROCEDURE — 25000242 PHARM REV CODE 250 ALT 637 W/ HCPCS: Performed by: NURSE PRACTITIONER

## 2017-05-17 RX ORDER — ALBUTEROL SULFATE 90 UG/1
2 AEROSOL, METERED RESPIRATORY (INHALATION) EVERY 6 HOURS PRN
COMMUNITY

## 2017-05-17 RX ORDER — HYDROXYZINE HYDROCHLORIDE 25 MG/1
25 TABLET, FILM COATED ORAL 2 TIMES DAILY
Status: DISCONTINUED | OUTPATIENT
Start: 2017-05-17 | End: 2017-05-17

## 2017-05-17 RX ORDER — DEXMEDETOMIDINE HYDROCHLORIDE 4 UG/ML
0.2 INJECTION, SOLUTION INTRAVENOUS CONTINUOUS
Status: DISCONTINUED | OUTPATIENT
Start: 2017-05-17 | End: 2017-05-18

## 2017-05-17 RX ORDER — HALOPERIDOL 5 MG/ML
2 INJECTION INTRAMUSCULAR ONCE
Status: COMPLETED | OUTPATIENT
Start: 2017-05-17 | End: 2017-05-17

## 2017-05-17 RX ADMIN — IPRATROPIUM BROMIDE AND ALBUTEROL SULFATE 3 ML: .5; 3 SOLUTION RESPIRATORY (INHALATION) at 06:05

## 2017-05-17 RX ADMIN — ACETAMINOPHEN 650 MG: 325 TABLET ORAL at 08:05

## 2017-05-17 RX ADMIN — ARFORMOTEROL TARTRATE 15 MCG: 15 SOLUTION RESPIRATORY (INHALATION) at 08:05

## 2017-05-17 RX ADMIN — FAMOTIDINE 20 MG: 20 TABLET ORAL at 08:05

## 2017-05-17 RX ADMIN — LORAZEPAM 0.5 MG: 0.5 TABLET ORAL at 12:05

## 2017-05-17 RX ADMIN — PAROXETINE HYDROCHLORIDE 20 MG: 20 TABLET, FILM COATED ORAL at 06:05

## 2017-05-17 RX ADMIN — DEXMEDETOMIDINE HYDROCHLORIDE 0.2 MCG/KG/HR: 4 INJECTION, SOLUTION INTRAVENOUS at 10:05

## 2017-05-17 RX ADMIN — FLUOXETINE 20 MG: 20 CAPSULE ORAL at 08:05

## 2017-05-17 RX ADMIN — AMITRIPTYLINE HYDROCHLORIDE 25 MG: 25 TABLET, FILM COATED ORAL at 05:05

## 2017-05-17 RX ADMIN — LORAZEPAM 1 MG: 2 INJECTION INTRAMUSCULAR; INTRAVENOUS at 09:05

## 2017-05-17 RX ADMIN — ARFORMOTEROL TARTRATE 15 MCG: 15 SOLUTION RESPIRATORY (INHALATION) at 06:05

## 2017-05-17 RX ADMIN — HALOPERIDOL LACTATE 2 MG: 5 INJECTION, SOLUTION INTRAMUSCULAR at 07:05

## 2017-05-17 RX ADMIN — IPRATROPIUM BROMIDE AND ALBUTEROL SULFATE 3 ML: .5; 3 SOLUTION RESPIRATORY (INHALATION) at 07:05

## 2017-05-17 RX ADMIN — METHYLPREDNISOLONE SODIUM SUCCINATE 40 MG: 40 INJECTION, POWDER, FOR SOLUTION INTRAMUSCULAR; INTRAVENOUS at 03:05

## 2017-05-17 RX ADMIN — IPRATROPIUM BROMIDE AND ALBUTEROL SULFATE 3 ML: .5; 3 SOLUTION RESPIRATORY (INHALATION) at 11:05

## 2017-05-17 RX ADMIN — NICOTINE 1 PATCH: 21 PATCH, EXTENDED RELEASE TRANSDERMAL at 05:05

## 2017-05-17 RX ADMIN — HALOPERIDOL LACTATE 2 MG: 5 INJECTION, SOLUTION INTRAMUSCULAR at 04:05

## 2017-05-17 RX ADMIN — GUAIFENESIN AND DEXTROMETHORPHAN HYDROBROMIDE 1 TABLET: 600; 30 TABLET, EXTENDED RELEASE ORAL at 08:05

## 2017-05-17 RX ADMIN — IPRATROPIUM BROMIDE AND ALBUTEROL SULFATE 3 ML: .5; 3 SOLUTION RESPIRATORY (INHALATION) at 12:05

## 2017-05-17 RX ADMIN — HALOPERIDOL LACTATE 2 MG: 5 INJECTION, SOLUTION INTRAMUSCULAR at 05:05

## 2017-05-17 RX ADMIN — ENOXAPARIN SODIUM 40 MG: 100 INJECTION SUBCUTANEOUS at 05:05

## 2017-05-17 RX ADMIN — LORAZEPAM 0.5 MG: 0.5 TABLET ORAL at 06:05

## 2017-05-17 RX ADMIN — METHYLPREDNISOLONE SODIUM SUCCINATE 80 MG: 40 INJECTION, POWDER, FOR SOLUTION INTRAMUSCULAR; INTRAVENOUS at 07:05

## 2017-05-17 RX ADMIN — METHYLPREDNISOLONE SODIUM SUCCINATE 40 MG: 40 INJECTION, POWDER, FOR SOLUTION INTRAMUSCULAR; INTRAVENOUS at 05:05

## 2017-05-17 RX ADMIN — BENZONATATE 200 MG: 100 CAPSULE ORAL at 05:05

## 2017-05-17 RX ADMIN — CEFTRIAXONE 1 G: 1 INJECTION, SOLUTION INTRAVENOUS at 05:05

## 2017-05-17 RX ADMIN — BUDESONIDE 0.5 MG: 0.5 SUSPENSION RESPIRATORY (INHALATION) at 06:05

## 2017-05-17 RX ADMIN — LORAZEPAM 0.5 MG: 0.5 TABLET ORAL at 05:05

## 2017-05-17 RX ADMIN — BUDESONIDE 0.5 MG: 0.5 SUSPENSION RESPIRATORY (INHALATION) at 07:05

## 2017-05-17 NOTE — SUBJECTIVE & OBJECTIVE
Interval History:  Very somnolent this morning and only arouses to painful stimuli.  She received both Ativan and Haldol at 6 am for agitation overnight.    Review of Systems   Unable to perform ROS: Mental status change   Constitutional: Negative for chills and fever.   HENT: Negative for congestion and sore throat.    Eyes: Negative for visual disturbance.   Cardiovascular: Negative for chest pain, palpitations and leg swelling.   Gastrointestinal: Negative for abdominal pain, blood in stool, constipation, diarrhea, nausea and vomiting.   Genitourinary: Negative for dysuria and hematuria.   Musculoskeletal: Negative for arthralgias and back pain.   Skin: Negative for rash and wound.   Neurological: Negative for dizziness, weakness, light-headedness and numbness.   Hematological: Negative for adenopathy.     Objective:     Vital Signs (Most Recent):  Temp: 97.9 °F (36.6 °C) (05/16/17 1939)  Pulse: (!) 112 (05/16/17 1958)  Resp: 20 (05/16/17 1958)  BP: 128/72 (05/16/17 1939)  SpO2: 96 % (05/16/17 1952) Vital Signs (24h Range):  Temp:  [97.5 °F (36.4 °C)-98.2 °F (36.8 °C)] 97.9 °F (36.6 °C)  Pulse:  [] 112  Resp:  [18-24] 20  SpO2:  [91 %-98 %] 96 %  BP: ()/(72-99) 128/72     Weight: 48.1 kg (106 lb)  Body mass index is 18.19 kg/(m^2).    Intake/Output Summary (Last 24 hours) at 05/16/17 2333  Last data filed at 05/16/17 2216   Gross per 24 hour   Intake             1570 ml   Output             1200 ml   Net              370 ml      Physical Exam   Constitutional: She appears well-developed and well-nourished. No distress.   HENT:   Head: Normocephalic and atraumatic.   Mouth/Throat: Oropharynx is clear and moist.   Eyes: Conjunctivae and EOM are normal. Pupils are equal, round, and reactive to light.   Neck: Neck supple. No JVD present. No thyromegaly present.   Cardiovascular: Normal rate and regular rhythm.  Exam reveals no gallop and no friction rub.    No murmur heard.  Pulmonary/Chest: She has  wheezes. She has no rales.   Coarse crackles bilaterally.  Expiratory wheezes and generally reduced breath sounds.   Abdominal: Soft. Bowel sounds are normal. She exhibits no distension. There is no tenderness. There is no rebound and no guarding.   Musculoskeletal: Normal range of motion. She exhibits no edema or deformity.   Lymphadenopathy:     She has no cervical adenopathy.   Neurological: She has normal reflexes.   Somnolent and arouses to pain only   Skin: Skin is warm and dry. No rash noted.   Nursing note and vitals reviewed.      Significant Labs:   CBC:     Recent Labs  Lab 05/15/17  0604 05/16/17  0611   WBC 12.21 11.51   HGB 12.4 11.5*   HCT 38.3 35.2*    187     CMP:     Recent Labs  Lab 05/15/17  0604      K 3.8      CO2 24   *   BUN 8   CREATININE 0.7   CALCIUM 9.1   PROT 6.6   ALBUMIN 2.9*   BILITOT 0.2   ALKPHOS 83   AST 27   ALT 19   ANIONGAP 11   EGFRNONAA >60       Significant Imaging: I have reviewed all pertinent imaging results/findings within the past 24 hours.

## 2017-05-17 NOTE — PLAN OF CARE
Problem: Patient Care Overview  Goal: Plan of Care Review  Outcome: Ongoing (interventions implemented as appropriate)  Patient did well most of the shift. Her agitation increased around 0400. Patient given PRN tessalon pearls for her cough, IV Haldol for increased agitation. Sitter Evelyn at bedside. Vital signs stable. Will continue to monitor.

## 2017-05-17 NOTE — PLAN OF CARE
Problem: Patient Care Overview  Goal: Individualization & Mutuality  Outcome: Ongoing (interventions implemented as appropriate)  DX:resp failure, hypoxia, Right lower lobe pneumonia  PATIENT REMAINS FREE FROM FALLS, FALL PRECAUTIONS IN PLACE.  VS STABLE  NO OTHER C/O AT THIS TIME  CALL BELL AND BELONGINGS WITHIN REACH, REMINDED TO CALL FOR ASSISTANCE.

## 2017-05-17 NOTE — PHYSICIAN QUERY
PT Name: Jeniffer Fernandez  MR #: [id number]    Physician Query Form - Cause and Effect Relationship Clarification      CDS/: Vivien Law               Contact information:[phone]    This form is a permanent document in the medical record.     Query Date: May 17, 2017    By submitting this query, we are merely seeking further clarification of documentation. Please utilize your independent clinical judgment when addressing the question(s) below.    The Medical record contains the following:  Supporting Clinical Findings   Location in record      Pneumonia involving right lung   Triple ABX - deescalate when appropriate   Supplemental oxygen   Respiratory culture                                                                                                    IV Vancomycin,Zosyn       IV Rocephin    IV Cipro                                                                           H&P            Mar 5-14  ED 1 time  Mar 5-14  Mar 5-14 to 5-15     Haemophilus influenzae                                                                                                                                                                                         Sputum culture 5-14         Provider, please clarify if there is any correlation between _Pneumoniae and Haemophilus influenzae _.           Are the conditions:     [ X ] Due to or associated with each other     [  ] Unrelated to each other     [  ] Other (Please Specify): _________________________     [  ] Clinically Undetermined

## 2017-05-17 NOTE — PROGRESS NOTES
[facility] Hospital Medicine  Progress Note    Patient Name: Jeniffer Fernandez  MRN: [id number]  Patient Class: IP- Inpatient   Admission Date: 5/14/2017  Length of Stay: 2 days  Attending Physician: Lewis Suarez MD  Primary Care Provider: Ben Pelaez Jr, MD        Subjective:     Principal Problem:Acute on chronic respiratory failure with hypoxia    HPI:  Jeniffer Fernandez is a 58 yo female + smoker with COPD and chronic respiratory failure who presented with worsening SOB over 1 week. For 5 days she has worn oxygen continuously. She describes frequent productive cough, wheezing, SOB, generalized weakness and FRANCES. Last night she had subjective fever and sweats. Pt has had several neb treatments and still having SOB. In the ED, ABG reflects acute on chronic hypoxic respiratory failure and O2 sat 87 %. CTA of Chest indicated a patchy RUL/RLL interstitial infiltrate.     Hospital Course:       Interval History:  Very somnolent this morning and only arouses to painful stimuli.  She received both Ativan and Haldol at 6 am for agitation overnight.    Review of Systems   Unable to perform ROS: Mental status change   Constitutional: Negative for chills and fever.   HENT: Negative for congestion and sore throat.    Eyes: Negative for visual disturbance.   Cardiovascular: Negative for chest pain, palpitations and leg swelling.   Gastrointestinal: Negative for abdominal pain, blood in stool, constipation, diarrhea, nausea and vomiting.   Genitourinary: Negative for dysuria and hematuria.   Musculoskeletal: Negative for arthralgias and back pain.   Skin: Negative for rash and wound.   Neurological: Negative for dizziness, weakness, light-headedness and numbness.   Hematological: Negative for adenopathy.     Objective:     Vital Signs (Most Recent):  Temp: 97.9 °F (36.6 °C) (05/16/17 1939)  Pulse: (!) 112 (05/16/17 1958)  Resp: 20 (05/16/17 1958)  BP: 128/72 (05/16/17 1939)  SpO2: 96 % (05/16/17 1952) Vital  Signs (24h Range):  Temp:  [97.5 °F (36.4 °C)-98.2 °F (36.8 °C)] 97.9 °F (36.6 °C)  Pulse:  [] 112  Resp:  [18-24] 20  SpO2:  [91 %-98 %] 96 %  BP: ()/(72-99) 128/72     Weight: 48.1 kg (106 lb)  Body mass index is 18.19 kg/(m^2).    Intake/Output Summary (Last 24 hours) at 05/16/17 2333  Last data filed at 05/16/17 2216   Gross per 24 hour   Intake             1570 ml   Output             1200 ml   Net              370 ml      Physical Exam   Constitutional: She appears well-developed and well-nourished. No distress.   HENT:   Head: Normocephalic and atraumatic.   Mouth/Throat: Oropharynx is clear and moist.   Eyes: Conjunctivae and EOM are normal. Pupils are equal, round, and reactive to light.   Neck: Neck supple. No JVD present. No thyromegaly present.   Cardiovascular: Normal rate and regular rhythm.  Exam reveals no gallop and no friction rub.    No murmur heard.  Pulmonary/Chest: She has wheezes. She has no rales.   Coarse crackles bilaterally.  Expiratory wheezes and generally reduced breath sounds.   Abdominal: Soft. Bowel sounds are normal. She exhibits no distension. There is no tenderness. There is no rebound and no guarding.   Musculoskeletal: Normal range of motion. She exhibits no edema or deformity.   Lymphadenopathy:     She has no cervical adenopathy.   Neurological: She has normal reflexes.   Somnolent and arouses to pain only   Skin: Skin is warm and dry. No rash noted.   Nursing note and vitals reviewed.      Significant Labs:   CBC:     Recent Labs  Lab 05/15/17  0604 05/16/17  0611   WBC 12.21 11.51   HGB 12.4 11.5*   HCT 38.3 35.2*    187     CMP:     Recent Labs  Lab 05/15/17  0604      K 3.8      CO2 24   *   BUN 8   CREATININE 0.7   CALCIUM 9.1   PROT 6.6   ALBUMIN 2.9*   BILITOT 0.2   ALKPHOS 83   AST 27   ALT 19   ANIONGAP 11   EGFRNONAA >60       Significant Imaging: I have reviewed all pertinent imaging results/findings within the past 24  hours.    Assessment/Plan:      * Acute on chronic respiratory failure with hypoxia  Supplemental oxygen to maintain sat > 92 % - wean as approximate    Anxiety  Resume home med    Bipolar disorder  Resume home med    Tobacco abuse  Encouraged smoking cessation  Denies need for Nicotine patch    COPD exacerbation  Supplemental oxygen  Xopenex  IV Corticosteroids    Pneumonia involving right lung  Triple ABX - deescalate when appropriate  Supplemental oxygen  Respiratory culture with normal markus plus H.influenzae that is beta lactamase negative.  Stop Vancomycin, Zosyn, and Cipro and replace with Ceftriaxone.    VTE Risk Mitigation         Ordered     enoxaparin injection 40 mg  Daily     Route:  Subcutaneous        05/14/17 1512     Medium Risk of VTE  Once      05/14/17 1132     Place sequential compression device  Until discontinued      05/14/17 1132          Lewis Suarez MD  Department of Hospital Medicine   [facility]

## 2017-05-17 NOTE — NURSING
Patient upset that she could not sleep. Told the sitter she was going to go sleep outside her room. Spoke with patient and she said that she wanted another room and some coffee. Patient getting more agitated by the minute. Spoke to charge nurse and decided to administer 2mg IV Haldol to help calm and relax pt. Will continue to monitor.

## 2017-05-17 NOTE — CONSULTS
Pulmonary /Critical Care   Consult Note    Inpatient consult to Pulmonology  Consult performed by: ALANA US  Consult ordered by: PHYLLIS CARNEY        SUBJECTIVE: confused disorientated on 2nd full hospital day      History of Present Illness:  Patient is a 57 y.o. female presents with exacerbation of chronic obstructive pulmonary disease and pneumonia - became confused disorientated. Called to assess. Onset of symptoms was abrupt starting earlier today with rapidly worsening course since that time. Patient unable to give history . Info obtained from  - he denies significant alcohol use. Symptoms are aggravated by stimulation . Symptoms improve with rest and haldol. No prior history of alcohol withdrawal.    Cultures positive for hemophilus  Ceftriaxone - first dose given at 6:30 this (5/17/2017)  am    Review of patient's allergies indicates:   Allergen Reactions    Codeine Itching    Codeine phosphate      Itchy (skin)^     Past Medical History:   Diagnosis Date    Anxiety     Bipolar disorder     COPD (chronic obstructive pulmonary disease)     Depression     Respiratory distress     SOB (shortness of breath)      Past Surgical History:   Procedure Laterality Date    COLONOSCOPY      TUBAL LIGATION       Family History   Problem Relation Age of Onset    COPD Mother     Cancer Father      brain    Aneurysm Brother     Suicide Son     No Known Problems Son     No Known Problems Son      Social History   Substance Use Topics    Smoking status: Current Every Day Smoker     Packs/day: 0.50     Years: 35.00     Types: Cigarettes    Smokeless tobacco: None      Comment: decreasing amount of cigarettes    Alcohol use Yes      Comment: occassionally     Review of Systems   Unable to perform ROS: Mental status change     OBJECTIVE:     Vital Signs:  Temp:  [98.2 °F (36.8 °C)-98.7 °F (37.1 °C)]   Pulse:  [118-139]   Resp:  [20-24]   BP: (136-152)/(91-94)   SpO2:  [96 %-98 %]      Physical Exam   Constitutional: She appears well-developed and well-nourished.   HENT:   Head: Normocephalic and atraumatic.   Mouth/Throat: Oropharyngeal exudate present.   Eyes: Conjunctivae are normal. Pupils are equal, round, and reactive to light.   Neck: Neck supple. No JVD present. No tracheal deviation present. No thyromegaly present.   Cardiovascular: Normal rate, regular rhythm and normal heart sounds.    Pulmonary/Chest: Effort normal. No respiratory distress. She has decreased breath sounds. She has wheezes in the right lower field and the left lower field. She has no rhonchi. She has no rales. She exhibits no tenderness.   Abdominal: Soft. Bowel sounds are normal.   Musculoskeletal: Normal range of motion. She exhibits no edema.   Lymphadenopathy:     She has no cervical adenopathy.   Neurological:   Confused and disorientated   Skin: Skin is warm and dry.   Nursing note and vitals reviewed.    Laboratory:  CBC:   Recent Labs  Lab 05/17/17  0546   WBC 9.02   RBC 3.68*   HGB 11.9*   HCT 36.7*      *   MCH 32.3*   MCHC 32.4     BMP:   Recent Labs  Lab 05/17/17  0546   *      K 3.9      CO2 26   BUN 13   CREATININE 0.7   CALCIUM 9.0     CMP:   Recent Labs  Lab 05/15/17  0604 05/17/17  0546   * 149*   CALCIUM 9.1 9.0   ALBUMIN 2.9* 3.0*   PROT 6.6  --     141   K 3.8 3.9   CO2 24 26    103   BUN 8 13   CREATININE 0.7 0.7   ALKPHOS 83  --    ALT 19  --    AST 27  --    BILITOT 0.2  --      LFTs:   Recent Labs  Lab 05/15/17  0604 05/17/17  0546   ALT 19  --    AST 27  --    ALKPHOS 83  --    BILITOT 0.2  --    PROT 6.6  --    ALBUMIN 2.9* 3.0*     Coagulation:   Recent Labs  Lab 05/14/17  0800   INR 1.0     Microbiology Results (last 7 days)     Procedure Component Value Units Date/Time    Blood culture [[id number]] Collected:  05/14/17 0815    Order Status:  Completed Specimen:  Blood from Peripheral, Forearm, Left Updated:  05/16/17 2012     Blood  Culture, Routine No Growth to date     Blood Culture, Routine No Growth to date     Blood Culture, Routine No Growth to date    Blood culture [[id number]] Collected:  05/14/17 0800    Order Status:  Completed Specimen:  Blood from Peripheral, Forearm, Left Updated:  05/16/17 2012     Blood Culture, Routine No Growth to date     Blood Culture, Routine No Growth to date     Blood Culture, Routine No Growth to date    Culture, Respiratory with Gram Stain [[id number]] Collected:  05/14/17 1640    Order Status:  Completed Specimen:  Respiratory from Sputum, Expectorated Updated:  05/16/17 1323     Respiratory Culture --     HAEMOPHILUS INFLUENZAE  Few  Beta Lactamase negative  Normal respiratory markus also present       Gram Stain (Respiratory) <10 epithelial cells per low power field.     Gram Stain (Respiratory) Moderate WBC's     Gram Stain (Respiratory) No organisms seen          Diagnostic Results:  Labs: Reviewed  X-Ray: Reviewed  CT: Reviewed  RLL pneumonia       Moderate Severe Obstructive Defect /Physiology  FEV1 is increased by >12% and >200mls, indicating a response to inhaled bronchodilators.  FVC is increased by >12% and >200mls indicating a response to inhaled bronchodilators  Increased Volume Suggestive of Hyperinflation  Diffusion Moderately Reduced  (Physician 12/03/2014 12:59PM, Dr. EDNA Broussard / Final: 12/03/2014 12:59PM, Dr. EDNA Broussard)  ASSESSMENT/PLAN:     Problem   Acute On Chronic Respiratory Failure With Hypoxia   Delirium Due to Another Medical Condition, Acute, Hyperactive   Pneumonia Involving Right Lung           Patient Active Problem List   Diagnosis    Closed fracture of proximal end of left humerus    Fracture of proximal humerus    Pneumonia    Depression    Anxiety    Bipolar disorder    COPD, severe    Tobacco abuse    Abdominal pain    Lung infiltrate    COPD exacerbation    Acute on chronic respiratory failure with hypoxia    Pneumonia involving right lung    Delirium due  to another medical condition, acute, hyperactive         Plan: Stop steroids, benadryl, benzonatate  Haldol for agitation  Low flow oxygen  Check ammonia level  Transfer to ICU    Time 60 min      I, Ilan Plata MD, have personally examined this patient, reviewed clinical notes,  laboratory data, EKG tracings, radiologist's reports of imaging studies and personally reviewed radiographic studies. I have reviewed this critical care note  and agree with the exam and treatment plan with the exception or inclusion of  none.  I have reviewed the chart and also discussed the management and care of this critically ill patient with  bedside nurses, other physician(s) and healthcare providers..      Ilan Plata MD  Pulmonary / Critical Care Medicine

## 2017-05-17 NOTE — ASSESSMENT & PLAN NOTE
Triple ABX - deescalate when appropriate  Supplemental oxygen  Respiratory culture with normal markus plus H.influenzae that is beta lactamase negative.  Stop Vancomycin, Zosyn, and Cipro and replace with Ceftriaxone.

## 2017-05-18 PROBLEM — R79.89 LFT ELEVATION: Status: ACTIVE | Noted: 2017-05-18

## 2017-05-18 LAB
ALBUMIN SERPL BCP-MCNC: 3 G/DL
ALLENS TEST: ABNORMAL
ALLENS TEST: ABNORMAL
ALP SERPL-CCNC: 86 U/L
ALT SERPL W/O P-5'-P-CCNC: 149 U/L
AMMONIA PLAS-SCNC: 63 UMOL/L
ANION GAP SERPL CALC-SCNC: 8 MMOL/L
AST SERPL-CCNC: 157 U/L
BASOPHILS # BLD AUTO: 0.01 K/UL
BASOPHILS NFR BLD: 0.1 %
BILIRUB SERPL-MCNC: 0.3 MG/DL
BUN SERPL-MCNC: 21 MG/DL
CALCIUM SERPL-MCNC: 8.5 MG/DL
CHLORIDE SERPL-SCNC: 103 MMOL/L
CK SERPL-CCNC: 272 U/L
CO2 SERPL-SCNC: 30 MMOL/L
CREAT SERPL-MCNC: 0.7 MG/DL
DELSYS: ABNORMAL
DELSYS: ABNORMAL
DIFFERENTIAL METHOD: ABNORMAL
EOSINOPHIL # BLD AUTO: 0 K/UL
EOSINOPHIL NFR BLD: 0 %
ERYTHROCYTE [DISTWIDTH] IN BLOOD BY AUTOMATED COUNT: 14.7 %
EST. GFR  (AFRICAN AMERICAN): >60 ML/MIN/1.73 M^2
EST. GFR  (NON AFRICAN AMERICAN): >60 ML/MIN/1.73 M^2
FLOW: 2
FLOW: 2
GLUCOSE SERPL-MCNC: 144 MG/DL
HCO3 UR-SCNC: 30.6 MMOL/L (ref 24–28)
HCO3 UR-SCNC: 30.7 MMOL/L (ref 24–28)
HCT VFR BLD AUTO: 35.4 %
HGB BLD-MCNC: 11.4 G/DL
LYMPHOCYTES # BLD AUTO: 0.8 K/UL
LYMPHOCYTES NFR BLD: 10.7 %
MCH RBC QN AUTO: 32.4 PG
MCHC RBC AUTO-ENTMCNC: 32.2 %
MCV RBC AUTO: 101 FL
MODE: ABNORMAL
MODE: ABNORMAL
MONOCYTES # BLD AUTO: 1 K/UL
MONOCYTES NFR BLD: 13 %
NEUTROPHILS # BLD AUTO: 6 K/UL
NEUTROPHILS NFR BLD: 76.2 %
PCO2 BLDA: 52.6 MMHG (ref 35–45)
PCO2 BLDA: 62.6 MMHG (ref 35–45)
PH SMN: 7.3 [PH] (ref 7.35–7.45)
PH SMN: 7.37 [PH] (ref 7.35–7.45)
PLATELET # BLD AUTO: 184 K/UL
PMV BLD AUTO: 10.8 FL
PO2 BLDA: 53 MMHG (ref 80–100)
PO2 BLDA: 74 MMHG (ref 80–100)
POC BE: 4 MMOL/L
POC BE: 5 MMOL/L
POC SATURATED O2: 82 % (ref 95–100)
POC SATURATED O2: 94 % (ref 95–100)
POCT GLUCOSE: 136 MG/DL (ref 70–110)
POCT GLUCOSE: 174 MG/DL (ref 70–110)
POTASSIUM SERPL-SCNC: 4.3 MMOL/L
PROT SERPL-MCNC: 5.6 G/DL
RBC # BLD AUTO: 3.52 M/UL
SAMPLE: ABNORMAL
SAMPLE: ABNORMAL
SITE: ABNORMAL
SITE: ABNORMAL
SODIUM SERPL-SCNC: 141 MMOL/L
WBC # BLD AUTO: 7.87 K/UL

## 2017-05-18 PROCEDURE — 63600175 PHARM REV CODE 636 W HCPCS: Performed by: HOSPITALIST

## 2017-05-18 PROCEDURE — 63600175 PHARM REV CODE 636 W HCPCS: Performed by: INTERNAL MEDICINE

## 2017-05-18 PROCEDURE — 27000221 HC OXYGEN, UP TO 24 HOURS

## 2017-05-18 PROCEDURE — 51702 INSERT TEMP BLADDER CATH: CPT

## 2017-05-18 PROCEDURE — 25000242 PHARM REV CODE 250 ALT 637 W/ HCPCS: Performed by: NURSE PRACTITIONER

## 2017-05-18 PROCEDURE — 25000003 PHARM REV CODE 250: Performed by: HOSPITALIST

## 2017-05-18 PROCEDURE — 99900035 HC TECH TIME PER 15 MIN (STAT)

## 2017-05-18 PROCEDURE — 25000003 PHARM REV CODE 250: Performed by: NURSE PRACTITIONER

## 2017-05-18 PROCEDURE — 25000242 PHARM REV CODE 250 ALT 637 W/ HCPCS: Performed by: INTERNAL MEDICINE

## 2017-05-18 PROCEDURE — 80053 COMPREHEN METABOLIC PANEL: CPT

## 2017-05-18 PROCEDURE — 36415 COLL VENOUS BLD VENIPUNCTURE: CPT

## 2017-05-18 PROCEDURE — 82550 ASSAY OF CK (CPK): CPT

## 2017-05-18 PROCEDURE — 97802 MEDICAL NUTRITION INDIV IN: CPT

## 2017-05-18 PROCEDURE — 63600175 PHARM REV CODE 636 W HCPCS: Performed by: NURSE PRACTITIONER

## 2017-05-18 PROCEDURE — 99223 1ST HOSP IP/OBS HIGH 75: CPT | Mod: ,,, | Performed by: PSYCHIATRY & NEUROLOGY

## 2017-05-18 PROCEDURE — C9113 INJ PANTOPRAZOLE SODIUM, VIA: HCPCS | Performed by: INTERNAL MEDICINE

## 2017-05-18 PROCEDURE — 85025 COMPLETE CBC W/AUTO DIFF WBC: CPT

## 2017-05-18 PROCEDURE — 85347 COAGULATION TIME ACTIVATED: CPT

## 2017-05-18 PROCEDURE — 20000000 HC ICU ROOM

## 2017-05-18 PROCEDURE — 25000003 PHARM REV CODE 250: Performed by: INTERNAL MEDICINE

## 2017-05-18 PROCEDURE — 36600 WITHDRAWAL OF ARTERIAL BLOOD: CPT

## 2017-05-18 PROCEDURE — 99291 CRITICAL CARE FIRST HOUR: CPT | Mod: ,,, | Performed by: INTERNAL MEDICINE

## 2017-05-18 PROCEDURE — 82140 ASSAY OF AMMONIA: CPT

## 2017-05-18 PROCEDURE — 94640 AIRWAY INHALATION TREATMENT: CPT

## 2017-05-18 RX ORDER — ALBUTEROL SULFATE 2.5 MG/.5ML
2.5 SOLUTION RESPIRATORY (INHALATION) ONCE
Status: COMPLETED | OUTPATIENT
Start: 2017-05-18 | End: 2017-05-18

## 2017-05-18 RX ORDER — IPRATROPIUM BROMIDE AND ALBUTEROL SULFATE 2.5; .5 MG/3ML; MG/3ML
3 SOLUTION RESPIRATORY (INHALATION) EVERY 4 HOURS PRN
Status: DISCONTINUED | OUTPATIENT
Start: 2017-05-18 | End: 2017-05-24

## 2017-05-18 RX ORDER — LACTULOSE 10 G/15ML
15 SOLUTION ORAL 3 TIMES DAILY
Status: DISCONTINUED | OUTPATIENT
Start: 2017-05-18 | End: 2017-05-24

## 2017-05-18 RX ORDER — PANTOPRAZOLE SODIUM 40 MG/10ML
40 INJECTION, POWDER, LYOPHILIZED, FOR SOLUTION INTRAVENOUS DAILY
Status: DISCONTINUED | OUTPATIENT
Start: 2017-05-18 | End: 2017-05-24

## 2017-05-18 RX ORDER — IBUPROFEN 200 MG
1 TABLET ORAL DAILY
Status: DISCONTINUED | OUTPATIENT
Start: 2017-05-19 | End: 2017-05-18

## 2017-05-18 RX ORDER — HALOPERIDOL 5 MG/ML
5 INJECTION INTRAMUSCULAR EVERY 6 HOURS PRN
Status: DISCONTINUED | OUTPATIENT
Start: 2017-05-18 | End: 2017-05-25

## 2017-05-18 RX ORDER — DEXMEDETOMIDINE HYDROCHLORIDE 4 UG/ML
0.5 INJECTION, SOLUTION INTRAVENOUS CONTINUOUS
Status: DISCONTINUED | OUTPATIENT
Start: 2017-05-18 | End: 2017-05-18

## 2017-05-18 RX ORDER — DEXMEDETOMIDINE HYDROCHLORIDE 4 UG/ML
0.5 INJECTION, SOLUTION INTRAVENOUS CONTINUOUS
Status: DISPENSED | OUTPATIENT
Start: 2017-05-18 | End: 2017-05-19

## 2017-05-18 RX ORDER — SODIUM CHLORIDE 9 MG/ML
INJECTION, SOLUTION INTRAVENOUS CONTINUOUS
Status: DISCONTINUED | OUTPATIENT
Start: 2017-05-18 | End: 2017-05-24

## 2017-05-18 RX ORDER — LORAZEPAM 2 MG/ML
1 INJECTION INTRAMUSCULAR EVERY 4 HOURS PRN
Status: DISCONTINUED | OUTPATIENT
Start: 2017-05-18 | End: 2017-05-19

## 2017-05-18 RX ADMIN — ARFORMOTEROL TARTRATE 15 MCG: 15 SOLUTION RESPIRATORY (INHALATION) at 08:05

## 2017-05-18 RX ADMIN — PANTOPRAZOLE SODIUM 40 MG: 40 INJECTION, POWDER, FOR SOLUTION INTRAVENOUS at 10:05

## 2017-05-18 RX ADMIN — BUDESONIDE 0.5 MG: 0.5 SUSPENSION RESPIRATORY (INHALATION) at 07:05

## 2017-05-18 RX ADMIN — LORAZEPAM 1 MG: 2 INJECTION INTRAMUSCULAR; INTRAVENOUS at 10:05

## 2017-05-18 RX ADMIN — BUDESONIDE 0.5 MG: 0.5 SUSPENSION RESPIRATORY (INHALATION) at 08:05

## 2017-05-18 RX ADMIN — SODIUM CHLORIDE: 0.9 INJECTION, SOLUTION INTRAVENOUS at 10:05

## 2017-05-18 RX ADMIN — IPRATROPIUM BROMIDE AND ALBUTEROL SULFATE 3 ML: .5; 3 SOLUTION RESPIRATORY (INHALATION) at 07:05

## 2017-05-18 RX ADMIN — ARFORMOTEROL TARTRATE 15 MCG: 15 SOLUTION RESPIRATORY (INHALATION) at 07:05

## 2017-05-18 RX ADMIN — LACTULOSE 15 G: 10 SOLUTION ORAL at 09:05

## 2017-05-18 RX ADMIN — IPRATROPIUM BROMIDE AND ALBUTEROL SULFATE 3 ML: .5; 3 SOLUTION RESPIRATORY (INHALATION) at 02:05

## 2017-05-18 RX ADMIN — ENOXAPARIN SODIUM 40 MG: 100 INJECTION SUBCUTANEOUS at 05:05

## 2017-05-18 RX ADMIN — NICOTINE 1 PATCH: 21 PATCH, EXTENDED RELEASE TRANSDERMAL at 06:05

## 2017-05-18 RX ADMIN — LACTULOSE 15 G: 10 SOLUTION ORAL at 04:05

## 2017-05-18 RX ADMIN — IPRATROPIUM BROMIDE AND ALBUTEROL SULFATE 3 ML: .5; 3 SOLUTION RESPIRATORY (INHALATION) at 12:05

## 2017-05-18 RX ADMIN — CEFTRIAXONE 1 G: 1 INJECTION, SOLUTION INTRAVENOUS at 06:05

## 2017-05-18 RX ADMIN — HALOPERIDOL LACTATE 5 MG: 5 INJECTION, SOLUTION INTRAMUSCULAR at 06:05

## 2017-05-18 RX ADMIN — ALBUTEROL SULFATE 2.5 MG: 2.5 SOLUTION RESPIRATORY (INHALATION) at 07:05

## 2017-05-18 RX ADMIN — IPRATROPIUM BROMIDE AND ALBUTEROL SULFATE 3 ML: .5; 3 SOLUTION RESPIRATORY (INHALATION) at 11:05

## 2017-05-18 RX ADMIN — THIAMINE HYDROCHLORIDE 100 MG: 100 INJECTION, SOLUTION INTRAMUSCULAR; INTRAVENOUS at 09:05

## 2017-05-18 RX ADMIN — DEXMEDETOMIDINE HYDROCHLORIDE 0.5 MCG/KG/HR: 4 INJECTION, SOLUTION INTRAVENOUS at 08:05

## 2017-05-18 RX ADMIN — LORAZEPAM 1 MG: 2 INJECTION INTRAMUSCULAR; INTRAVENOUS at 06:05

## 2017-05-18 RX ADMIN — IPRATROPIUM BROMIDE AND ALBUTEROL SULFATE 3 ML: .5; 3 SOLUTION RESPIRATORY (INHALATION) at 08:05

## 2017-05-18 RX ADMIN — DEXMEDETOMIDINE HYDROCHLORIDE 0.6 MCG/KG/HR: 4 INJECTION, SOLUTION INTRAVENOUS at 08:05

## 2017-05-18 NOTE — NURSING
Pt  at bedside, updated on condition, states that pt was having hallucinations for the last two days, last night pulled out IV and was found walking down the [place] saying there were two men in her room fighting.  Asked for  to give nurse updated home med list as he also states she is not taking all the meds since they couldn't afford to get them filled.

## 2017-05-18 NOTE — CONSULTS
Inpatient consult to Neurology  Consult performed by: LY RIOS  Consult ordered by: MONA GONZALEZ        Consult Requested By: Mona Gonzalez MD  Reason for Consult: Mental status changes     SUBJECTIVE:       HPI:   Jeniffer Fernandez is a 57 y.o. female patient who presents to the Emergency Department for SOB which onset gradually a week ago. Symptoms are constant and moderate in severity. Pt has hx of COPD. Pt was seen by pulmonologist and was prescribed prednisone. Pt felt better at first, but is now getting worse. She got admitted for COPD management. She also has Psych problem and takes a lot of medication. She got admitted in the Hospital on 14th instant but per doctor, she was showing some mental status  changes , hallucinations. Less responsive. Nurse reported that some of the medication had been given here but she was not taking at home . She was given Latuda and Gedion . She was also given  Elavil, Ativan , hydroxyzine. Her CO2 shyla from 38 to 52 in the hospital. She became somnolent , less responsive.   Bipap was given and now she is back to her sense . She has communicated well. Nurse said that medication has been re tuned.     Past Medical History:   Diagnosis Date    Anxiety     Bipolar disorder     COPD (chronic obstructive pulmonary disease)     Depression     Respiratory distress     SOB (shortness of breath)      Past Surgical History:   Procedure Laterality Date    COLONOSCOPY      TUBAL LIGATION       Family History   Problem Relation Age of Onset    COPD Mother     Cancer Father      brain    Aneurysm Brother     Suicide Son     No Known Problems Son     No Known Problems Son      Social History   Substance Use Topics    Smoking status: Current Every Day Smoker     Packs/day: 0.50     Years: 35.00     Types: Cigarettes    Smokeless tobacco: None      Comment: decreasing amount of cigarettes    Alcohol use Yes      Comment: occassionally     Review of patient's allergies  indicates:   Allergen Reactions    Codeine Itching    Codeine phosphate      Itchy (skin)^       Scheduled Meds:   albuterol-ipratropium 2.5mg-0.5mg/3mL  3 mL Nebulization Q6H    arformoterol  15 mcg Nebulization Q12H    budesonide  0.5 mg Nebulization Q12H    cefTRIAXone (ROCEPHIN) IVPB  1 g Intravenous Q24H    enoxaparin  40 mg Subcutaneous Daily    lactulose  15 g Oral TID    nicotine  1 patch Transdermal Daily    pantoprazole  40 mg Intravenous Daily         Review of Systems   Constitutional: Negative for fever and weight loss.   HENT: Negative for hearing loss.    Eyes: Negative for blurred vision, double vision, photophobia and pain.   Respiratory: Positive for cough, shortness of breath and wheezing.    Cardiovascular: Negative for chest pain.   Gastrointestinal: Negative for abdominal pain, nausea and vomiting.   Genitourinary: Negative for dysuria, frequency and urgency.   Musculoskeletal: Negative.  Negative for back pain, falls, joint pain, myalgias and neck pain.   Skin: Negative for itching and rash.   Neurological: Negative for tingling and headaches.   Psychiatric/Behavioral: Negative for depression and memory loss.       OBJECTIVE:     Vital Signs (Most Recent)  Temp: 97.9 °F (36.6 °C) (05/18/17 1105)  Pulse: (!) 123 (05/18/17 1435)  Resp: (!) 30 (05/18/17 1435)  BP: (!) 155/102 (05/18/17 1415)  SpO2: 100 % (05/18/17 1435)      Examination is very limited ; She is on BIPAP and has difficulty in talking with BIPAP . It was taken off for 5 minutes. She opened her yes. Answer questions. Shows left and right hand. Raised her legs. She counted  From one to ten backwards and forwards.   DTR: was brisk and no clonus. There is no neck rigidity and fever.    Laboratory:  Lab Results   Component Value Date    WBC 7.87 05/18/2017    HGB 11.4 (L) 05/18/2017    HCT 35.4 (L) 05/18/2017     05/18/2017     (H) 05/18/2017     (H) 05/18/2017     05/18/2017    K 4.3 05/18/2017      05/18/2017    CREATININE 0.7 05/18/2017    BUN 21 (H) 05/18/2017    CO2 30 (H) 05/18/2017    INR 1.0 05/14/2017   Results for SULTANA BEDOYA (MRN [id number]) as of 5/18/2017 16:54   Ref. Range 5/18/2017 05:34   POC PH Latest Ref Range: 7.35 - 7.45  7.373   POC PCO2 Latest Ref Range: 35 - 45 mmHg 52.6 (H)   POC PO2 Latest Ref Range: 80 - 100 mmHg 74 (L)   POC BE Latest Ref Range: -2 to 2 mmol/L 5   POC HCO3 Latest Ref Range: 24 - 28 mmol/L 30.6 (H)   POC SATURATED O2 Latest Ref Range: 95 - 100 % 94 (L)         Diagnostic Results:  CT head: negative for acute process.  CTA of the chest:  Impression        Negative for pulmonary embolus.    Patchy right upper lobe and right lower lobe interstitial infiltrate/pneumonia with some nodularity at the dependent right lower lobe.  No pleural effusion.  Short term imaging followup after appropriate treatment is recommended.    Mediastinal adenopathy, perhaps reactive.  Attention on followup.    Emphysema.    Moderate stenosis proximal celiac artery.           ASSESSMENT/PLAN:     Medical decision making:   Her sudden change of mental status could from metabolic encephalopathy , either from medication or CO2 narcosis. ICU psychosis another provisional diagnosis. But good thing is that she is improving.    Patient Active Problem List   Diagnosis    Closed fracture of proximal end of left humerus    Fracture of proximal humerus    Pneumonia    Depression    Anxiety    Bipolar disorder    COPD, severe    Tobacco abuse    Abdominal pain    Lung infiltrate    COPD exacerbation    Acute on chronic respiratory failure with hypoxia    Pneumonia involving right lung    Delirium due to another medical condition, acute, hyperactive    LFT elevation        Plan:  Recommendation:  D/C unnecessary medication which can be CNS depressants : Elavil, requip . Cautious use of Ativan .  Will not do any further testing at this time,  Thank you for the consults.  Mary Grossman,  MD

## 2017-05-18 NOTE — PROGRESS NOTES
[facility] Hospital Medicine  Progress Note    Patient Name: Jeniffer Fernandez  MRN: [id number]  Patient Class: IP- Inpatient   Admission Date: 5/14/2017  Length of Stay: 4 days  Attending Physician: Lewis Suarez MD  Primary Care Provider: Ben Pelaez Jr, MD        Subjective:     Principal Problem:Acute on chronic respiratory failure with hypoxia    HPI:  Jeniffer Fernandez is a 56 yo female + smoker with COPD and chronic respiratory failure who presented with worsening SOB over 1 week. For 5 days she has worn oxygen continuously. She describes frequent productive cough, wheezing, SOB, generalized weakness and FRANCES. Last night she had subjective fever and sweats. Pt has had several neb treatments and still having SOB. In the ED, ABG reflects acute on chronic hypoxic respiratory failure and O2 sat 87 %. CTA of Chest indicated a patchy RUL/RLL interstitial infiltrate.     Hospital Course:  Very agitated late morning 16 May and received Haldol and Ativan and slept most of the day.  She was better the following morning but became more confused and agitated in the evening and combative with staff.  Increased wheezing and pulling her oxygen off with desaturations.  When stable her blood gas showed adequate oxygenation and a pCO2 only slightly high at 50.  Transferred to ICU for respiratory failure.    Interval History:  She additional trouble with agitation late morning but was more alert this morning after smaller doses of Haldol and Ativan.  Complains of shortness of breath but breathing comfortably and maintaining saturation.    Review of Systems   Unable to perform ROS: Mental status change   Constitutional: Negative for chills and fever.   HENT: Negative for congestion and sore throat.    Eyes: Negative for visual disturbance.   Cardiovascular: Negative for chest pain, palpitations and leg swelling.   Gastrointestinal: Negative for abdominal pain, blood in stool, constipation, diarrhea, nausea and  vomiting.   Genitourinary: Negative for dysuria and hematuria.   Musculoskeletal: Negative for arthralgias and back pain.   Skin: Negative for rash and wound.   Neurological: Negative for dizziness, weakness, light-headedness and numbness.   Hematological: Negative for adenopathy.     Objective:     Vital Signs (Most Recent):  Temp: 98 °F (36.7 °C) (05/17/17 2330)  Pulse: 105 (05/18/17 0045)  Resp: (!) 25 (05/18/17 0045)  BP: 116/86 (05/17/17 2300)  SpO2: 100 % (05/18/17 0045) Vital Signs (24h Range):  Temp:  [97.1 °F (36.2 °C)-98.7 °F (37.1 °C)] 98 °F (36.7 °C)  Pulse:  [] 105  Resp:  [19-40] 25  SpO2:  [90 %-100 %] 100 %  BP: ()/(67-95) 116/86     Weight: 48.1 kg (106 lb)  Body mass index is 18.19 kg/(m^2).    Intake/Output Summary (Last 24 hours) at 05/18/17 0126  Last data filed at 05/18/17 0005   Gross per 24 hour   Intake           175.18 ml   Output                0 ml   Net           175.18 ml      Physical Exam   Constitutional: She appears well-developed and well-nourished. No distress.   HENT:   Head: Normocephalic and atraumatic.   Mouth/Throat: Oropharynx is clear and moist.   Eyes: Conjunctivae and EOM are normal. Pupils are equal, round, and reactive to light.   Neck: Neck supple. No JVD present. No thyromegaly present.   Cardiovascular: Normal rate and regular rhythm.  Exam reveals no gallop and no friction rub.    No murmur heard.  Pulmonary/Chest: She has wheezes. She has no rales.   Coarse crackles bilaterally.  Expiratory wheezes and generally reduced breath sounds.   Abdominal: Soft. Bowel sounds are normal. She exhibits no distension. There is no tenderness. There is no rebound and no guarding.   Musculoskeletal: Normal range of motion. She exhibits no edema or deformity.   Lymphadenopathy:     She has no cervical adenopathy.   Neurological: She has normal reflexes.   Somnolent and arouses to pain only   Skin: Skin is warm and dry. No rash noted.   Nursing note and vitals  reviewed.      Significant Labs:   CBC:     Recent Labs  Lab 05/16/17  0611 05/17/17  0546   WBC 11.51 9.02   HGB 11.5* 11.9*   HCT 35.2* 36.7*    212     CMP:     Recent Labs  Lab 05/17/17  0546      K 3.9      CO2 26   *   BUN 13   CREATININE 0.7   CALCIUM 9.0   ALBUMIN 3.0*   ANIONGAP 12   EGFRNONAA >60       Significant Imaging: I have reviewed all pertinent imaging results/findings within the past 24 hours.    Assessment/Plan:      * Acute on chronic respiratory failure with hypoxia  Supplemental oxygen to maintain sat > 92 % - wean as appropriate.  She has severe underlying COPD with most recent PFT in 2014 and has continued to smoke since then.  Her episodes of confusion and agitation are unclear.  She was not hypoxic and only slightly hypercapneic.  She has a history of bipolar disorder and misuse of medication and alcohol.  Her  has been consistent, helpful, and seems reliable in thinking she has not been drinking heavily or consistently in the recent weeks.  Alcohol withdrawal should be kept as a consideration but the history so far does not clearly indicate it.  Consult Pulmonology.    Anxiety  Continuing only Paroxetine.  Careful use of Benzodiazepines with severe COPD.    Bipolar disorder  Holding all meds except Paroxetine.  She endorsed a history of misuse of Alcohol and Latuda.    Tobacco abuse  Must quit smoking and alcohol.  Denies need for Nicotine patch    COPD exacerbation  Supplemental oxygen  Xopenex  IV Corticosteroids    Pneumonia involving right lung  Supplemental oxygen.  Severe underlying chronic lung disease.  Respiratory culture with normal markus plus H.influenzae that is beta lactamase negative.    Continue Ceftriaxone.    VTE Risk Mitigation         Ordered     enoxaparin injection 40 mg  Daily     Route:  Subcutaneous        05/14/17 1512     Medium Risk of VTE  Once      05/14/17 1132     Place sequential compression device  Until discontinued       05/14/17 1132          Lewis Suarez MD  Department of Hospital Medicine   [facility]

## 2017-05-18 NOTE — ASSESSMENT & PLAN NOTE
Nutrition Problem:   Other: Inadequate oral food and beverage intake    Etiology/Related to:   AMS    As evidenced by:  NPO due to acute AMS    Treatment Strategy:   1. Enteral nutrition support    Nutrition Diagnosis Status:   Resolved (patient on oral diet with improved mental status)

## 2017-05-18 NOTE — SUBJECTIVE & OBJECTIVE
Interval History:  She additional trouble with agitation late morning but was more alert this morning after smaller doses of Haldol and Ativan.  Complains of shortness of breath but breathing comfortably and maintaining saturation.    Review of Systems   Unable to perform ROS: Mental status change   Constitutional: Negative for chills and fever.   HENT: Negative for congestion and sore throat.    Eyes: Negative for visual disturbance.   Cardiovascular: Negative for chest pain, palpitations and leg swelling.   Gastrointestinal: Negative for abdominal pain, blood in stool, constipation, diarrhea, nausea and vomiting.   Genitourinary: Negative for dysuria and hematuria.   Musculoskeletal: Negative for arthralgias and back pain.   Skin: Negative for rash and wound.   Neurological: Negative for dizziness, weakness, light-headedness and numbness.   Hematological: Negative for adenopathy.     Objective:     Vital Signs (Most Recent):  Temp: 98 °F (36.7 °C) (05/17/17 2330)  Pulse: 105 (05/18/17 0045)  Resp: (!) 25 (05/18/17 0045)  BP: 116/86 (05/17/17 2300)  SpO2: 100 % (05/18/17 0045) Vital Signs (24h Range):  Temp:  [97.1 °F (36.2 °C)-98.7 °F (37.1 °C)] 98 °F (36.7 °C)  Pulse:  [] 105  Resp:  [19-40] 25  SpO2:  [90 %-100 %] 100 %  BP: ()/(67-95) 116/86     Weight: 48.1 kg (106 lb)  Body mass index is 18.19 kg/(m^2).    Intake/Output Summary (Last 24 hours) at 05/18/17 0126  Last data filed at 05/18/17 0005   Gross per 24 hour   Intake           175.18 ml   Output                0 ml   Net           175.18 ml      Physical Exam   Constitutional: She appears well-developed and well-nourished. No distress.   HENT:   Head: Normocephalic and atraumatic.   Mouth/Throat: Oropharynx is clear and moist.   Eyes: Conjunctivae and EOM are normal. Pupils are equal, round, and reactive to light.   Neck: Neck supple. No JVD present. No thyromegaly present.   Cardiovascular: Normal rate and regular rhythm.  Exam reveals no  gallop and no friction rub.    No murmur heard.  Pulmonary/Chest: She has wheezes. She has no rales.   Coarse crackles bilaterally.  Expiratory wheezes and generally reduced breath sounds.   Abdominal: Soft. Bowel sounds are normal. She exhibits no distension. There is no tenderness. There is no rebound and no guarding.   Musculoskeletal: Normal range of motion. She exhibits no edema or deformity.   Lymphadenopathy:     She has no cervical adenopathy.   Neurological: She has normal reflexes.   Somnolent and arouses to pain only   Skin: Skin is warm and dry. No rash noted.   Nursing note and vitals reviewed.      Significant Labs:   CBC:     Recent Labs  Lab 05/16/17  0611 05/17/17  0546   WBC 11.51 9.02   HGB 11.5* 11.9*   HCT 35.2* 36.7*    212     CMP:     Recent Labs  Lab 05/17/17  0546      K 3.9      CO2 26   *   BUN 13   CREATININE 0.7   CALCIUM 9.0   ALBUMIN 3.0*   ANIONGAP 12   EGFRNONAA >60       Significant Imaging: I have reviewed all pertinent imaging results/findings within the past 24 hours.

## 2017-05-18 NOTE — PLAN OF CARE
Decline in medical status patient transferred to ICU. Patient remains obtunded this am. Spouse at bedside, discussed possible SNF,LTAC placement if medical status warrants. CM will continue to assess.     05/18/17 6077   Discharge Reassessment   Assessment Type Discharge Planning Reassessment   Can the patient answer the patient profile reliably? No, cognitively impaired   How does the patient rate their overall health at the present time? Fair   Describe the patient's ability to walk at the present time. Major restrictions/daily assistance from another person   How often would a person be available to care for the patient? Whenever needed   Discharge plan remains the same: (see CM progress note)   Discharge Plan A Other   Discharge Plan B Other   Change in patient condition or support system Yes   Involved the patient/caregiver in establishing a new discharge plan: Yes

## 2017-05-18 NOTE — ASSESSMENT & PLAN NOTE
Supplemental oxygen to maintain sat > 92 % - wean as appropriate.  She has severe underlying COPD with most recent PFT in 2014 and has continued to smoke since then.  Pulmonary consulted.

## 2017-05-18 NOTE — PROGRESS NOTES
[facility] Hospital Medicine  Progress Note    Patient Name: Jeniffer Fernandez  MRN: [id number]  Patient Class: IP- Inpatient   Admission Date: 5/14/2017  Length of Stay: 4 days  Attending Physician: Lewis Suarez MD  Primary Care Provider: Ben Pelaez Jr, MD    Subjective:     Principal Problem:Acute on chronic respiratory failure with hypoxia    HPI:  Jeniffer Fernandez is a 58 yo female + smoker with COPD and chronic respiratory failure who presented with worsening SOB over 1 week. For 5 days she had worn oxygen continuously. She describes frequent productive cough, wheezing, SOB, generalized weakness and FRANCES. Prior to presentation she had subjective fever and sweats. Pt has had several neb treatments and still having SOB. In the ED, ABG reflects acute on chronic hypoxic respiratory failure and O2 sat 87 %. CTA of Chest indicated a patchy RUL/RLL interstitial infiltrate.     Hospital Course:  Very agitated late morning 16 May and received Haldol and Ativan and slept most of the day.  She was better the following morning but became more confused and agitated in the evening and combative with staff.  Increased wheezing and pulling her oxygen off with desaturations.  When stable her blood gas showed adequate oxygenation and a pCO2 only slightly high at 50.  Transferred to ICU for respiratory failure. Was started on Precedex that has now been discontinued.    Interval History: Patient obtunded in the morning. According to the  she had been having visual hallucinations two days ago. No previous h/o hallucinations per .    Review of Systems   Unable to perform ROS: Mental status change     Objective:     Vital Signs (Most Recent):  Temp: 97.9 °F (36.6 °C) (05/18/17 0300)  Pulse: 75 (05/18/17 0800)  Resp: 19 (05/18/17 0800)  BP: 96/80 (05/18/17 0700)  SpO2: 99 % (05/18/17 0800) Vital Signs (24h Range):  Temp:  [97.1 °F (36.2 °C)-98.2 °F (36.8 °C)] 97.9 °F (36.6 °C)  Pulse:  []  75  Resp:  [18-40] 19  SpO2:  [90 %-100 %] 99 %  BP: ()/(65-94) 96/80     Weight: 46 kg (101 lb 6.6 oz)  Body mass index is 17.41 kg/(m^2).    Intake/Output Summary (Last 24 hours) at 05/18/17 0956  Last data filed at 05/18/17 0610   Gross per 24 hour   Intake           113.38 ml   Output                0 ml   Net           113.38 ml      Physical Exam   Constitutional: No distress.   HENT:   Head: Normocephalic and atraumatic.   Eyes: EOM are normal. Pupils are equal, round, and reactive to light.   Neck: No tracheal deviation present. No thyromegaly present.   Cardiovascular: Normal rate, regular rhythm and normal heart sounds.  Exam reveals no friction rub.    No murmur heard.  Pulmonary/Chest: Effort normal. No respiratory distress. She has wheezes. She has no rales.   Abdominal: Soft. Bowel sounds are normal. She exhibits no distension. There is no tenderness.   Musculoskeletal: She exhibits no edema or tenderness.   Neurological:   Obtunded   Skin: Skin is warm and dry.     Significant Labs:   A1C: No results for input(s): HGBA1C in the last 4320 hours.  ABGs:   Recent Labs  Lab 05/18/17  0534   PH 7.373   PCO2 52.6*   HCO3 30.6*   POCSATURATED 94*   BE 5     Bilirubin:   Recent Labs  Lab 05/15/17  0604 05/18/17 0442   BILITOT 0.2 0.3     Blood Culture: No results for input(s): LABBLOO in the last 48 hours.  BMP:   Recent Labs  Lab 05/18/17  0442   *      K 4.3      CO2 30*   BUN 21*   CREATININE 0.7   CALCIUM 8.5*     CBC:   Recent Labs  Lab 05/17/17  0546 05/18/17  0442   WBC 9.02 7.87   HGB 11.9* 11.4*   HCT 36.7* 35.4*    184     CMP:   Recent Labs  Lab 05/17/17  0546 05/18/17  0442    141   K 3.9 4.3    103   CO2 26 30*   * 144*   BUN 13 21*   CREATININE 0.7 0.7   CALCIUM 9.0 8.5*   PROT  --  5.6*   ALBUMIN 3.0* 3.0*   BILITOT  --  0.3   ALKPHOS  --  86   AST  --  157*   ALT  --  149*   ANIONGAP 12 8   EGFRNONAA >60 >60     Cardiac Markers: No results  for input(s): CKMB, MYOGLOBIN, BNP, TROPISTAT in the last 48 hours.  Coagulation: No results for input(s): INR, APTT in the last 48 hours.    Invalid input(s): PT  Lactic Acid: No results for input(s): LACTATE in the last 48 hours.  Lipase: No results for input(s): LIPASE in the last 48 hours.    Significant Imaging: Heart size is normal.  No pneumothorax.  Reticulonodular lung disease is slightly improved.  Postoperative changes left humeral neck.    Assessment/Plan:      Delirium due to another medical condition, acute, hyperactive  Her episodes of confusion and agitation are unclear.  She was not hypoxic and only slightly hypercapneic.  She has a history of depression and misuse of medication and alcohol.  Her  has been consistent, helpful, and seems reliable in thinking she has not been drinking heavily or consistently in the recent weeks.  Will hold all sedating medications today and check a CT scan of her head to rule out an intracranial pathology. Will also start Lactulose for elevated ammonia level. Consider Psych consult when more awake.      * Acute on chronic respiratory failure with hypoxia  Supplemental oxygen to maintain sat > 92 % - wean as appropriate.  She has severe underlying COPD with most recent PFT in 2014 and has continued to smoke since then.  Pulmonary consulted.    Anxiety  Continuing only Paroxetine.  Careful use of Benzodiazepines with severe COPD.    COPD exacerbation  Supplemental oxygen  Xopenex  IV Corticosteroids    LFT elevation  - Check daily LFTs.  - Lactulose as above.    Bipolar disorder  Holding all meds except Paroxetine.  She endorsed a history of misuse of Alcohol and Latuda.    VTE Risk Mitigation         Ordered     enoxaparin injection 40 mg  Daily     Route:  Subcutaneous        05/14/17 1512     Medium Risk of VTE  Once      05/14/17 1132     Place sequential compression device  Until discontinued      05/14/17 1132        Adin Sanchez MD  Department of  [facility]   [facility]

## 2017-05-18 NOTE — SUBJECTIVE & OBJECTIVE
Interval History: Patient obtunded in the morning. According to the  she had been having visual hallucinations two days ago. No previous h/o hallucinations per .    Review of Systems   Unable to perform ROS: Mental status change     Objective:     Vital Signs (Most Recent):  Temp: 97.9 °F (36.6 °C) (05/18/17 0300)  Pulse: 75 (05/18/17 0800)  Resp: 19 (05/18/17 0800)  BP: 96/80 (05/18/17 0700)  SpO2: 99 % (05/18/17 0800) Vital Signs (24h Range):  Temp:  [97.1 °F (36.2 °C)-98.2 °F (36.8 °C)] 97.9 °F (36.6 °C)  Pulse:  [] 75  Resp:  [18-40] 19  SpO2:  [90 %-100 %] 99 %  BP: ()/(65-94) 96/80     Weight: 46 kg (101 lb 6.6 oz)  Body mass index is 17.41 kg/(m^2).    Intake/Output Summary (Last 24 hours) at 05/18/17 0956  Last data filed at 05/18/17 0610   Gross per 24 hour   Intake           113.38 ml   Output                0 ml   Net           113.38 ml      Physical Exam   Constitutional: No distress.   HENT:   Head: Normocephalic and atraumatic.   Eyes: EOM are normal. Pupils are equal, round, and reactive to light.   Neck: No tracheal deviation present. No thyromegaly present.   Cardiovascular: Normal rate, regular rhythm and normal heart sounds.  Exam reveals no friction rub.    No murmur heard.  Pulmonary/Chest: Effort normal. No respiratory distress. She has wheezes. She has no rales.   Abdominal: Soft. Bowel sounds are normal. She exhibits no distension. There is no tenderness.   Musculoskeletal: She exhibits no edema or tenderness.   Neurological:   Obtunded   Skin: Skin is warm and dry.     Significant Labs:   A1C: No results for input(s): HGBA1C in the last 4320 hours.  ABGs:   Recent Labs  Lab 05/18/17  0534   PH 7.373   PCO2 52.6*   HCO3 30.6*   POCSATURATED 94*   BE 5     Bilirubin:   Recent Labs  Lab 05/15/17  0604 05/18/17  0442   BILITOT 0.2 0.3     Blood Culture: No results for input(s): LABBLOO in the last 48 hours.  BMP:   Recent Labs  Lab 05/18/17 0442   *       K 4.3      CO2 30*   BUN 21*   CREATININE 0.7   CALCIUM 8.5*     CBC:   Recent Labs  Lab 05/17/17  0546 05/18/17  0442   WBC 9.02 7.87   HGB 11.9* 11.4*   HCT 36.7* 35.4*    184     CMP:   Recent Labs  Lab 05/17/17  0546 05/18/17  0442    141   K 3.9 4.3    103   CO2 26 30*   * 144*   BUN 13 21*   CREATININE 0.7 0.7   CALCIUM 9.0 8.5*   PROT  --  5.6*   ALBUMIN 3.0* 3.0*   BILITOT  --  0.3   ALKPHOS  --  86   AST  --  157*   ALT  --  149*   ANIONGAP 12 8   EGFRNONAA >60 >60     Cardiac Markers: No results for input(s): CKMB, MYOGLOBIN, BNP, TROPISTAT in the last 48 hours.  Coagulation: No results for input(s): INR, APTT in the last 48 hours.    Invalid input(s): PT  Lactic Acid: No results for input(s): LACTATE in the last 48 hours.  Lipase: No results for input(s): LIPASE in the last 48 hours.    Significant Imaging: Heart size is normal.  No pneumothorax.  Reticulonodular lung disease is slightly improved.  Postoperative changes left humeral neck.

## 2017-05-18 NOTE — NURSING
Spoke with Dr. Boo concerning pt updated med list from .  Asked to stop any sedating medications except ativan until pt become more coherent, will monitor.

## 2017-05-18 NOTE — PLAN OF CARE
Problem: Patient Care Overview  Goal: Plan of Care Review  Outcome: Ongoing (interventions implemented as appropriate)  CONFUSION DECREASED AND NOW ABLE TO COMMUNICATE NEEDS AND WANTS. CT OF HEAD OBTAINED.  OBTAINED NG TUBE TO FACILITATE LACTULOSE DOSING IN ORDER TO DECREASED AMMONIA LEVEL.  BIPAP INITIATED.  PT TOLERATED WELL.  SOB, CONFUSION, AGITATION, AND RESTLESSNESS DECREASED DURING BIPAP USAGE.   UPDATED TO POC AND PT CONDITION.  PT ABLE TO TAKE IN A FEW SIPS OF WATER PO.  URINARY CATHETER PLACED FOR URINARY RETENTION AND ACCURATE I&O'S. PT TURNED EVERY TWO HOURS AND HEELS FLOATED ON PILLOWS.

## 2017-05-18 NOTE — PLAN OF CARE
Problem: Patient Care Overview  Goal: Plan of Care Review  Outcome: Ongoing (interventions implemented as appropriate)  Recommendation/Intervention: 1. If unable to resume oral diet within 3 days consider Enteral Nutrition Support Nutren 15. at 40ml/hr (1440 calories, 58g protein, 744ml water) with fluhses as needed for hydration  Goals: Oral diet or nutrition support within 3 days  Nutrition Goal Status: new  Communication of RD Recs: discussed on rounds

## 2017-05-18 NOTE — NURSING
Pt received from telemetry, pt in acute distress, sitting up in bed, audible exp wheezes noted, pt confused, no really following commands, O2 sat on 5L N/C 96%, repositioned in bed, restless, received Haldol prior to transfer.

## 2017-05-18 NOTE — PLAN OF CARE
Pt discharged home with no CM needs as anticipated.         05/18/17 0833   Final Note   Assessment Type Final Discharge Note   Discharge Disposition Home   Discharge planning education complete? Yes   Hospital Follow Up  Appt(s) scheduled? No   Discharge plans and expectations educations in teach back method with documentation complete? Yes   Offered [facility] -- Bedside Delivery? n/a   Discharge/Hospital Encounter Summary to (non-[facility]) PCP n/a   Referral to Outpatient Case Management complete? n/a   Referral to / orders for Home Health Complete? n/a   30 day supply of medicines given at discharge, if documented non-compliance / non-adherence? n/a   Any social issues identified prior to discharge? No   Did you assess the readiness or willingness of the family or caregiver to support self management of care? Yes

## 2017-05-18 NOTE — PROGRESS NOTES
Called into patients room by spouse. Pt agitated, and moving around in bed. O2 off of patient, lips dusky. SPO2 76. Pt refusing to allow nurse to replace O2 cannula. With  assistance, replaced O2. Sats increase to 94%. Dr. Suarez notified, no new orders received at this time.

## 2017-05-18 NOTE — PROGRESS NOTES
[facility] -   Critical Care Medicine  Progress Note    Patient Name: Jeniffer Fernandez  MRN: [id number]  Admission Date: 5/14/2017  Hospital Length of Stay: 4 days  Code Status: Full Code  Attending Provider: Adin Sanchez MD  Primary Care Provider: Ben Pelaez Jr, MD   Principal Problem: Acute on chronic respiratory failure with hypoxia    Subjective: remains disoreintated, less combative on precedex     HPI: 57 y.o. female presents with exacerbation of chronic obstructive pulmonary disease and pneumonia - became confused disorientated. Called to assess. Onset of symptoms was abrupt starting earlier today with rapidly worsening course since that time. Patient unable to give history . Info obtained from  - he denies significant alcohol use. Symptoms are aggravated by stimulation . Symptoms improve with rest and haldol. No prior history of alcohol withdrawal.       Hospital/ICU Course: CT of head unremarkable. Placed on BiPAP per nursing staff    Interval History/Significant Events: lethargic, unable to give additional history      Review of Systems   Unable to perform ROS: Mental status change     Objective:     Vital Signs (Most Recent):  Temp: 97.9 °F (36.6 °C) (05/18/17 0300)  Pulse: (!) 141 (05/18/17 1102)  Resp: (!) 40 (05/18/17 1102)  BP: 96/80 (05/18/17 0700)  SpO2: (!) 93 % (05/18/17 1102) Vital Signs (24h Range):  Temp:  [97.1 °F (36.2 °C)-98 °F (36.7 °C)] 97.9 °F (36.6 °C)  Pulse:  [] 141  Resp:  [18-40] 40  SpO2:  [90 %-100 %] 93 %  BP: ()/(65-93) 96/80     Weight: 46 kg (101 lb 6.6 oz)  Body mass index is 17.41 kg/(m^2).      Intake/Output Summary (Last 24 hours) at 05/18/17 1303  Last data filed at 05/18/17 0610   Gross per 24 hour   Intake           113.38 ml   Output                0 ml   Net           113.38 ml       Physical Exam   Constitutional: She appears well-developed and well-nourished.   HENT:   Head: Normocephalic and atraumatic.   Mouth/Throat: Oropharyngeal  exudate present.   Eyes: Conjunctivae are normal. Pupils are equal, round, and reactive to light.   Neck: Neck supple. No JVD present. No tracheal deviation present. No thyromegaly present.   Cardiovascular: Normal rate, regular rhythm and normal heart sounds.    Pulmonary/Chest: Effort normal. No respiratory distress. She has decreased breath sounds. She has wheezes in the right lower field and the left lower field. She has no rhonchi. She has no rales. She exhibits no tenderness.   Abdominal: Soft. Bowel sounds are normal.   Musculoskeletal: Normal range of motion. She exhibits no edema.   Lymphadenopathy:     She has no cervical adenopathy.   Neurological: She has normal reflexes.   sedated on precedex   Skin: Skin is warm and dry.   Nursing note and vitals reviewed.      Vents:  Oxygen Concentration (%): 28 (05/18/17 0800)    Lines/Drains/Airways     Drain                 NG/OG Tube 05/18/17 1052 [place] sump 16 Fr. Left nostril less than 1 day          Peripheral Intravenous Line                 Peripheral IV - Single Lumen 05/16/17 0230 Right Forearm 2 days         Peripheral IV - Single Lumen 05/16/17 0430 Right Upper Arm 2 days                Significant Labs:    CBC/Anemia Profile:    Recent Labs  Lab 05/17/17  0546 05/18/17 0442   WBC 9.02 7.87   HGB 11.9* 11.4*   HCT 36.7* 35.4*    184   * 101*   RDW 14.6* 14.7*        Chemistries:    Recent Labs  Lab 05/17/17  0546 05/18/17 0442    141   K 3.9 4.3    103   CO2 26 30*   BUN 13 21*   CREATININE 0.7 0.7   CALCIUM 9.0 8.5*   ALBUMIN 3.0* 3.0*   PROT  --  5.6*   BILITOT  --  0.3   ALKPHOS  --  86   ALT  --  149*   AST  --  157*   PHOS 2.4*  --        Blood Culture: No results for input(s): LABBLOO in the last 48 hours.  BMP:   Recent Labs  Lab 05/18/17 0442   *      K 4.3      CO2 30*   BUN 21*   CREATININE 0.7   CALCIUM 8.5*     CMP:   Recent Labs  Lab 05/17/17  0546 05/18/17 0442    141   K 3.9 4.3   CL  103 103   CO2 26 30*   * 144*   BUN 13 21*   CREATININE 0.7 0.7   CALCIUM 9.0 8.5*   PROT  --  5.6*   ALBUMIN 3.0* 3.0*   BILITOT  --  0.3   ALKPHOS  --  86   AST  --  157*   ALT  --  149*   ANIONGAP 12 8   EGFRNONAA >60 >60     Lactic Acid: No results for input(s): LACTATE in the last 48 hours.    Significant Imaging:  CXR: I have reviewed all pertinent results/findings within the past 24 hours and my personal findings are:  stable    Assessment/Plan:     Active Diagnoses:    Diagnosis Date Noted POA    PRINCIPAL PROBLEM:  Acute on chronic respiratory failure with hypoxia [J96.21] 05/14/2017 Yes    Delirium due to another medical condition, acute, hyperactive [F05] 05/17/2017 No    Pneumonia involving right lung [J18.9] 05/14/2017 Yes    LFT elevation [R94.5] 05/18/2017 Unknown    COPD exacerbation [J44.1] 05/14/2017 Yes    Bipolar disorder [F31.9] 12/22/2016 Yes    Anxiety [F41.9] 12/22/2016 Yes    Tobacco abuse [Z72.0] 12/22/2016 Yes      Problems Resolved During this Admission:    Diagnosis Date Noted Date Resolved POA        Critical Care Medicine Daily Checklist:    A: Awake: RASS Goal/Actual Goal: RASS Goal: -1-->drowsy  Actual: Florian Agitation Sedation Scale (RASS): Agitated   B: Spontaneous Breathing Trial Performed?     C: SAT & SBT Coordinated?  na                      D: Delirium: CAM-ICU Overall CAM-ICU: Positive   E: Early Mobility Performed? No   F: Feeding Goal:    Status:     Current Diet Order   Procedures    Diet NPO Except for: Medication, Sips with Medication, Ice Chips     Order Specific Question:   Except for     Answer:   Medication     Order Specific Question:   Except for     Answer:   Sips with Medication     Order Specific Question:   Except for     Answer:   Ice Chips      AS: Analgesia/Sedation adequate   T: Thromboembolic Prophylaxis y   H: HOB > 300 Yes   U: Stress Ulcer Prophylaxis (if needed) Y   G: Glucose Control adequate   B: Bowel Function     I: Indwelling  Catheter (Lines & Rodriguez) Necessity needed   D: De-escalation of Antimicrobials/Pharmacotherapies no    Plan for the day/ETD Monitor     Code Status:  Family/Goals of Care: Full Code  discussed     Critical Care Time: 45 min  Critical secondary to Patient has a condition that poses threat to life and bodily function: Severe Respiratory Distress and Altered Mental Status and Delerium      Critical care was time spent personally by me on the following activities: development of treatment plan with patient or surrogate and bedside caregivers, discussions with consultants, evaluation of patient's response to treatment, examination of patient, ordering and performing treatments and interventions, ordering and review of laboratory studies, ordering and review of radiographic studies, pulse oximetry, re-evaluation of patient's condition.  This critical care time did not overlap with that of any other provider or involve time for any procedures.     Ilan Plata MD  Critical Care Medicine  [facility] [place]

## 2017-05-18 NOTE — CONSULTS
[facility] -   Adult Nutrition  Consult Note    SUMMARY     Recommendations  Recommendation/Intervention: 1. If unable to resume oral diet within 3 days consider Enteral Nutrition Support Nutren 15. at 40ml/hr (1440 calories, 58g protein, 744ml water) with fluhses as needed for hydration  Goals: Oral diet or nutrition support within 3 days  Nutrition Goal Status: new  Communication of RD Recs: discussed on rounds    Reason for Assessment  Reason for Assessment: nurse/nurse practitioner consult (ICU pt)  Diagnosis:  (Pneumonia)  Relevent Medical History: Anxiety, Depression, Bipolar, SOB, Respiratory Distress   Interdisciplinary Rounds: attended  General Information Comments: Patient developed AMS last night and was transfered to ICU.  Patient with reported drinking 2 glasses of wine each night, volume unknown.  Nutrition Discharge Planning: Too soon to determine    Nutrition Prescription Ordered  Current Diet Order: NPO     Nutrition Risk Screen  Nutrition Risk Screen: no indicators present    Nutrition/Diet History  Food Preferences: unable to obtain  Meal/Snack Patterns: unable to obtain    Labs/Tests/Procedures/Meds  Pertinent Labs Reviewed: reviewed  Pertinent Labs Comments: BUN 21, Gluc 144, Alb 3, , , Ammonia 63  Pertinent Medications Reviewed: reviewed    Physical Findings  Overall Physical Appearance:  (thin)  Tubes: nasogastric tube (placed 5/18)  Oral/Mouth Cavity: tooh/teeth broken, tooth/teeth missing  Skin:  (Haider 12)    Anthropometrics  Height (inches): 64.02 in  Weight Method: Bed Scale  Weight (kg): 46 kg  Ideal Body Weight (IBW), Female: 120.1 lb  % Ideal Body Weight, Female (lb): 84.44 lb  BMI (kg/m2): 17.4  BMI Grade: 17 - 18.4 protein-energy malnutrition grade I    Estimated/Assessed Needs  Weight Used For Calorie Calculations: 46 kg (101 lb 6.6 oz)   Height (cm): 162.6 cm  30 kcal/kg (kcal): 1380 and 35 kcal/kg (kcal): 1610   Weight Used For Protein Calculations:  46 kg (101 lb 6.6 oz)  0.8 gm Protein (gm): 36.88 and 1.0 gm Protein (gm): 46.1  Fluid Need Method: RDA Method (1ml/tito or as needed)    Assessment and Plan  Delirium due to another medical condition, acute, hyperactive  Nutrition Problem:   Inadequate energy intake    Etiology/Related to:   AMS    As evidenced by:  NPO due to acute AMS    Treatment Strategy:   1. Enteral nutrition support if unable to safely resume oral diet within 3 days    Nutrition Diagnosis Status:   New    Monitor and Evaluation  Food and Nutrient Intake: energy intake  Food and Nutrient Adminstration: diet order, enteral and parenteral nutrition administration  Physical Activity and Function: nutrition-related ADLs and IADLs  Anthropometric Measurements: weight  Biochemical Data, Medical Tests and Procedures: electrolyte and renal panel, glucose/endocrine profile  Nutrition-Focused Physical Findings: overall appearance, skin  % Intake of Estimated Energy Needs: 0 - 25 %  % Meal Intake: NPO    Nutrition Follow-Up  RD Follow-up?: Yes (2x weekly)

## 2017-05-18 NOTE — ASSESSMENT & PLAN NOTE
Supplemental oxygen.  Severe underlying chronic lung disease.  Respiratory culture with normal markus plus H.influenzae that is beta lactamase negative.    Continue Ceftriaxone.

## 2017-05-18 NOTE — ASSESSMENT & PLAN NOTE
Supplemental oxygen to maintain sat > 92 % - wean as appropriate.  She has severe underlying COPD with most recent PFT in 2014 and has continued to smoke since then.  Her episodes of confusion and agitation are unclear.  She was not hypoxic and only slightly hypercapneic.  She has a history of bipolar disorder and misuse of medication and alcohol.  Her  has been consistent, helpful, and seems reliable in thinking she has not been drinking heavily or consistently in the recent weeks.  Alcohol withdrawal should be kept as a consideration but the history so far does not clearly indicate it.  Consult Pulmonology.

## 2017-05-18 NOTE — ASSESSMENT & PLAN NOTE
Her episodes of confusion and agitation are unclear.  She was not hypoxic and only slightly hypercapneic.  She has a history of depression and misuse of medication and alcohol.  Her  has been consistent, helpful, and seems reliable in thinking she has not been drinking heavily or consistently in the recent weeks.  Will hold all sedating medications today and check a CT scan of her head to rule out an intracranial pathology. Will also start Lactulose for elevated ammonia level. Consider Psych consult when more awake.

## 2017-05-18 NOTE — PLAN OF CARE
Problem: Patient Care Overview  Goal: Plan of Care Review  Outcome: Ongoing (interventions implemented as appropriate)  POC reviewed with pt's  at bedside, all questions answered, indicated understanding; unable to review with pt d/t persistant AMS, resistant to reorientation, minimally verbal/loud moaning only to stimulation. Increasing agitation, restlessness soon after transfer, minimal response to prn ativan. Precedex gtt titrated per MAR for goal RASS of -1. No vasoactive drips in use. Turns/repositions self frequently in bed, no skin breakdown but multiple scabbed skin tears and bruising to BUE. 24 hour chart check completed. Rebecca Pruett RN

## 2017-05-19 LAB
ALBUMIN SERPL BCP-MCNC: 3.2 G/DL
ALLENS TEST: ABNORMAL
ALLENS TEST: ABNORMAL
ALP SERPL-CCNC: 104 U/L
ALT SERPL W/O P-5'-P-CCNC: 203 U/L
AMMONIA PLAS-SCNC: 29 UMOL/L
ANION GAP SERPL CALC-SCNC: 9 MMOL/L
AST SERPL-CCNC: 150 U/L
BACTERIA BLD CULT: NORMAL
BACTERIA BLD CULT: NORMAL
BASOPHILS # BLD AUTO: 0.01 K/UL
BASOPHILS NFR BLD: 0.1 %
BILIRUB DIRECT SERPL-MCNC: 0.1 MG/DL
BILIRUB SERPL-MCNC: 0.3 MG/DL
BUN SERPL-MCNC: 24 MG/DL
CALCIUM SERPL-MCNC: 8 MG/DL
CHLORIDE SERPL-SCNC: 108 MMOL/L
CO2 SERPL-SCNC: 29 MMOL/L
CREAT SERPL-MCNC: 0.7 MG/DL
DELSYS: ABNORMAL
DELSYS: ABNORMAL
DIASTOLIC DYSFUNCTION: YES
DIFFERENTIAL METHOD: ABNORMAL
EOSINOPHIL # BLD AUTO: 0 K/UL
EOSINOPHIL NFR BLD: 0.1 %
EP: 5
EP: 5
ERYTHROCYTE [DISTWIDTH] IN BLOOD BY AUTOMATED COUNT: 14.9 %
ERYTHROCYTE [SEDIMENTATION RATE] IN BLOOD BY WESTERGREN METHOD: 14 MM/H
ERYTHROCYTE [SEDIMENTATION RATE] IN BLOOD BY WESTERGREN METHOD: 14 MM/H
EST. GFR  (AFRICAN AMERICAN): >60 ML/MIN/1.73 M^2
EST. GFR  (NON AFRICAN AMERICAN): >60 ML/MIN/1.73 M^2
FIO2: 30
FIO2: 30
GLUCOSE SERPL-MCNC: 107 MG/DL
HCO3 UR-SCNC: 25.3 MMOL/L (ref 24–28)
HCO3 UR-SCNC: 27.5 MMOL/L (ref 24–28)
HCT VFR BLD AUTO: 36.7 %
HGB BLD-MCNC: 11.6 G/DL
IP: 12
IP: 12
LYMPHOCYTES # BLD AUTO: 1.5 K/UL
LYMPHOCYTES NFR BLD: 17.8 %
MAGNESIUM SERPL-MCNC: 2.5 MG/DL
MCH RBC QN AUTO: 32.4 PG
MCHC RBC AUTO-ENTMCNC: 31.6 %
MCV RBC AUTO: 103 FL
MODE: ABNORMAL
MODE: ABNORMAL
MONOCYTES # BLD AUTO: 1.1 K/UL
MONOCYTES NFR BLD: 12.8 %
NEUTROPHILS # BLD AUTO: 5.8 K/UL
NEUTROPHILS NFR BLD: 69.9 %
PCO2 BLDA: 49.1 MMHG (ref 35–45)
PCO2 BLDA: 53.6 MMHG (ref 35–45)
PH SMN: 7.28 [PH] (ref 7.35–7.45)
PH SMN: 7.36 [PH] (ref 7.35–7.45)
PLATELET # BLD AUTO: 188 K/UL
PMV BLD AUTO: 11.4 FL
PO2 BLDA: 85 MMHG (ref 80–100)
PO2 BLDA: 86 MMHG (ref 80–100)
POC BE: -1 MMOL/L
POC BE: 2 MMOL/L
POC SATURATED O2: 95 % (ref 95–100)
POC SATURATED O2: 96 % (ref 95–100)
POTASSIUM SERPL-SCNC: 3.8 MMOL/L
PROT SERPL-MCNC: 5.8 G/DL
RBC # BLD AUTO: 3.58 M/UL
RETIRED EF AND QEF - SEE NOTES: 30 (ref 55–65)
SAMPLE: ABNORMAL
SAMPLE: ABNORMAL
SITE: ABNORMAL
SITE: ABNORMAL
SODIUM SERPL-SCNC: 146 MMOL/L
TROPONIN I SERPL DL<=0.01 NG/ML-MCNC: 0.11 NG/ML
TROPONIN I SERPL DL<=0.01 NG/ML-MCNC: 0.13 NG/ML
WBC # BLD AUTO: 8.41 K/UL

## 2017-05-19 PROCEDURE — C9113 INJ PANTOPRAZOLE SODIUM, VIA: HCPCS | Performed by: INTERNAL MEDICINE

## 2017-05-19 PROCEDURE — 93306 TTE W/DOPPLER COMPLETE: CPT | Mod: 26,,, | Performed by: INTERNAL MEDICINE

## 2017-05-19 PROCEDURE — 94660 CPAP INITIATION&MGMT: CPT

## 2017-05-19 PROCEDURE — 36415 COLL VENOUS BLD VENIPUNCTURE: CPT

## 2017-05-19 PROCEDURE — 99291 CRITICAL CARE FIRST HOUR: CPT | Mod: ,,, | Performed by: INTERNAL MEDICINE

## 2017-05-19 PROCEDURE — 93306 TTE W/DOPPLER COMPLETE: CPT

## 2017-05-19 PROCEDURE — 63600175 PHARM REV CODE 636 W HCPCS: Performed by: INTERNAL MEDICINE

## 2017-05-19 PROCEDURE — 36600 WITHDRAWAL OF ARTERIAL BLOOD: CPT

## 2017-05-19 PROCEDURE — 99233 SBSQ HOSP IP/OBS HIGH 50: CPT | Mod: ,,, | Performed by: PSYCHIATRY & NEUROLOGY

## 2017-05-19 PROCEDURE — 85025 COMPLETE CBC W/AUTO DIFF WBC: CPT

## 2017-05-19 PROCEDURE — 80048 BASIC METABOLIC PNL TOTAL CA: CPT

## 2017-05-19 PROCEDURE — 99223 1ST HOSP IP/OBS HIGH 75: CPT | Mod: ,,, | Performed by: INTERNAL MEDICINE

## 2017-05-19 PROCEDURE — 63600175 PHARM REV CODE 636 W HCPCS: Performed by: HOSPITALIST

## 2017-05-19 PROCEDURE — 25000003 PHARM REV CODE 250

## 2017-05-19 PROCEDURE — 84484 ASSAY OF TROPONIN QUANT: CPT

## 2017-05-19 PROCEDURE — 25000003 PHARM REV CODE 250: Performed by: INTERNAL MEDICINE

## 2017-05-19 PROCEDURE — 20000000 HC ICU ROOM

## 2017-05-19 PROCEDURE — 99292 CRITICAL CARE ADDL 30 MIN: CPT | Mod: ,,, | Performed by: INTERNAL MEDICINE

## 2017-05-19 PROCEDURE — 94640 AIRWAY INHALATION TREATMENT: CPT

## 2017-05-19 PROCEDURE — 93005 ELECTROCARDIOGRAM TRACING: CPT

## 2017-05-19 PROCEDURE — 82803 BLOOD GASES ANY COMBINATION: CPT

## 2017-05-19 PROCEDURE — 83735 ASSAY OF MAGNESIUM: CPT

## 2017-05-19 PROCEDURE — 25000242 PHARM REV CODE 250 ALT 637 W/ HCPCS: Performed by: NURSE PRACTITIONER

## 2017-05-19 PROCEDURE — 99900035 HC TECH TIME PER 15 MIN (STAT)

## 2017-05-19 PROCEDURE — 25000003 PHARM REV CODE 250: Performed by: HOSPITALIST

## 2017-05-19 PROCEDURE — 93010 ELECTROCARDIOGRAM REPORT: CPT | Mod: ,,, | Performed by: INTERNAL MEDICINE

## 2017-05-19 PROCEDURE — 80076 HEPATIC FUNCTION PANEL: CPT

## 2017-05-19 PROCEDURE — 63600175 PHARM REV CODE 636 W HCPCS: Performed by: NURSE PRACTITIONER

## 2017-05-19 PROCEDURE — 82140 ASSAY OF AMMONIA: CPT

## 2017-05-19 RX ORDER — METOPROLOL TARTRATE 1 MG/ML
5 INJECTION, SOLUTION INTRAVENOUS ONCE
Status: COMPLETED | OUTPATIENT
Start: 2017-05-19 | End: 2017-05-19

## 2017-05-19 RX ORDER — DEXMEDETOMIDINE HYDROCHLORIDE 4 UG/ML
0.5 INJECTION, SOLUTION INTRAVENOUS CONTINUOUS
Status: DISCONTINUED | OUTPATIENT
Start: 2017-05-19 | End: 2017-05-19

## 2017-05-19 RX ORDER — ATORVASTATIN CALCIUM 10 MG/1
20 TABLET, FILM COATED ORAL DAILY
Status: COMPLETED | OUTPATIENT
Start: 2017-05-19 | End: 2017-05-20

## 2017-05-19 RX ORDER — ASPIRIN 325 MG
325 TABLET ORAL ONCE
Status: COMPLETED | OUTPATIENT
Start: 2017-05-19 | End: 2017-05-19

## 2017-05-19 RX ORDER — LORAZEPAM 2 MG/ML
2 INJECTION INTRAMUSCULAR
Status: DISCONTINUED | OUTPATIENT
Start: 2017-05-19 | End: 2017-05-22

## 2017-05-19 RX ORDER — LORAZEPAM 2 MG/ML
2 INJECTION INTRAMUSCULAR EVERY 4 HOURS PRN
Status: DISCONTINUED | OUTPATIENT
Start: 2017-05-19 | End: 2017-05-19

## 2017-05-19 RX ORDER — METOPROLOL TARTRATE 1 MG/ML
INJECTION, SOLUTION INTRAVENOUS
Status: COMPLETED
Start: 2017-05-19 | End: 2017-05-19

## 2017-05-19 RX ORDER — LORAZEPAM 2 MG/ML
2 INJECTION INTRAMUSCULAR
Status: DISCONTINUED | OUTPATIENT
Start: 2017-05-19 | End: 2017-05-19

## 2017-05-19 RX ORDER — CHLORDIAZEPOXIDE HYDROCHLORIDE 25 MG/1
50 CAPSULE, GELATIN COATED ORAL 4 TIMES DAILY
Status: DISCONTINUED | OUTPATIENT
Start: 2017-05-19 | End: 2017-05-21

## 2017-05-19 RX ORDER — LORAZEPAM 2 MG/ML
2 INJECTION INTRAMUSCULAR ONCE
Status: COMPLETED | OUTPATIENT
Start: 2017-05-19 | End: 2017-05-19

## 2017-05-19 RX ORDER — DEXMEDETOMIDINE HYDROCHLORIDE 4 UG/ML
0.5 INJECTION, SOLUTION INTRAVENOUS CONTINUOUS
Status: ACTIVE | OUTPATIENT
Start: 2017-05-19 | End: 2017-05-20

## 2017-05-19 RX ADMIN — DEXMEDETOMIDINE HYDROCHLORIDE 1.4 MCG/KG/HR: 4 INJECTION, SOLUTION INTRAVENOUS at 06:05

## 2017-05-19 RX ADMIN — LORAZEPAM 2 MG: 2 INJECTION INTRAMUSCULAR; INTRAVENOUS at 07:05

## 2017-05-19 RX ADMIN — METOPROLOL TARTRATE 5 MG: 5 INJECTION INTRAVENOUS at 07:05

## 2017-05-19 RX ADMIN — ATORVASTATIN CALCIUM 20 MG: 10 TABLET, FILM COATED ORAL at 06:05

## 2017-05-19 RX ADMIN — IPRATROPIUM BROMIDE AND ALBUTEROL SULFATE 3 ML: .5; 3 SOLUTION RESPIRATORY (INHALATION) at 07:05

## 2017-05-19 RX ADMIN — ARFORMOTEROL TARTRATE 15 MCG: 15 SOLUTION RESPIRATORY (INHALATION) at 07:05

## 2017-05-19 RX ADMIN — LACTULOSE 15 G: 10 SOLUTION ORAL at 01:05

## 2017-05-19 RX ADMIN — ASPIRIN 325 MG ORAL TABLET 325 MG: 325 PILL ORAL at 06:05

## 2017-05-19 RX ADMIN — HALOPERIDOL LACTATE 5 MG: 5 INJECTION, SOLUTION INTRAMUSCULAR at 06:05

## 2017-05-19 RX ADMIN — LACTULOSE 15 G: 10 SOLUTION ORAL at 10:05

## 2017-05-19 RX ADMIN — CHLORDIAZEPOXIDE HYDROCHLORIDE 50 MG: 25 CAPSULE ORAL at 05:05

## 2017-05-19 RX ADMIN — DEXMEDETOMIDINE HYDROCHLORIDE 1.4 MCG/KG/HR: 4 INJECTION, SOLUTION INTRAVENOUS at 08:05

## 2017-05-19 RX ADMIN — PANTOPRAZOLE SODIUM 40 MG: 40 INJECTION, POWDER, FOR SOLUTION INTRAVENOUS at 08:05

## 2017-05-19 RX ADMIN — LORAZEPAM 2 MG: 2 INJECTION INTRAMUSCULAR; INTRAVENOUS at 06:05

## 2017-05-19 RX ADMIN — LACTULOSE 15 G: 10 SOLUTION ORAL at 06:05

## 2017-05-19 RX ADMIN — IPRATROPIUM BROMIDE AND ALBUTEROL SULFATE 3 ML: .5; 3 SOLUTION RESPIRATORY (INHALATION) at 12:05

## 2017-05-19 RX ADMIN — BUDESONIDE 0.5 MG: 0.5 SUSPENSION RESPIRATORY (INHALATION) at 07:05

## 2017-05-19 RX ADMIN — ENOXAPARIN SODIUM 40 MG: 100 INJECTION SUBCUTANEOUS at 05:05

## 2017-05-19 RX ADMIN — DEXMEDETOMIDINE HYDROCHLORIDE 0.5 MCG/KG/HR: 4 INJECTION, SOLUTION INTRAVENOUS at 08:05

## 2017-05-19 RX ADMIN — SODIUM CHLORIDE: 0.9 INJECTION, SOLUTION INTRAVENOUS at 05:05

## 2017-05-19 RX ADMIN — CHLORDIAZEPOXIDE HYDROCHLORIDE 50 MG: 25 CAPSULE ORAL at 11:05

## 2017-05-19 RX ADMIN — Medication 1 G: at 06:05

## 2017-05-19 RX ADMIN — LORAZEPAM 1 MG: 2 INJECTION INTRAMUSCULAR; INTRAVENOUS at 01:05

## 2017-05-19 RX ADMIN — METOPROLOL TARTRATE 5 MG: 1 INJECTION, SOLUTION INTRAVENOUS at 07:05

## 2017-05-19 RX ADMIN — FOLIC ACID: 5 INJECTION, SOLUTION INTRAMUSCULAR; INTRAVENOUS; SUBCUTANEOUS at 11:05

## 2017-05-19 NOTE — H&P
Consult Note  Cardiology    Consult Requested By:  Reason for Consult:     SUBJECTIVE:     History of Present Illness:  Patient is a 57 y.o. female presents with PMHx of COPD, tobacco use, bipolar disorder and depression who was admitted to [facility] on 5/14/2017 with COPD exacerbation. Pt currently on Bipap and sedated, no family present, information obtain from medical records. She becomes increasingly anxious with shortness of breath, requiring Precedex and Bipap. EKG this AM: Normal sinus rhythm, ST and Marked T wave abnormality. Troponin this AM, 0.128.     Review of patient's allergies indicates:   Allergen Reactions    Codeine Itching    Codeine phosphate      Itchy (skin)^       Past Medical History:   Diagnosis Date    Anxiety     Bipolar disorder     COPD (chronic obstructive pulmonary disease)     Depression     Respiratory distress     SOB (shortness of breath)      Past Surgical History:   Procedure Laterality Date    COLONOSCOPY      TUBAL LIGATION       Family History   Problem Relation Age of Onset    COPD Mother     Cancer Father      brain    Aneurysm Brother     Suicide Son     No Known Problems Son     No Known Problems Son      Social History   Substance Use Topics    Smoking status: Current Every Day Smoker     Packs/day: 0.50     Years: 35.00     Types: Cigarettes    Smokeless tobacco: None      Comment: decreasing amount of cigarettes    Alcohol use Yes      Comment: occassionally        Review of Systems:  Unable to obtain, pt sedated on Bipap    OBJECTIVE:     Vital Signs (Most Recent)  Temp: 97.6 °F (36.4 °C) (05/19/17 1105)  Pulse: 90 (05/19/17 1300)  Resp: 14 (05/19/17 1300)  BP: 109/87 (05/19/17 1300)  SpO2: 97 % (05/19/17 1300)    Vital Signs Range (Last 24H):  Temp:  [97.6 °F (36.4 °C)-98.1 °F (36.7 °C)]   Pulse:  []   Resp:  [14-43]   BP: ()/()   SpO2:  [91 %-100 %]     Current Facility-Administered Medications   Medication    0.9%  NaCl infusion     acetaminophen tablet 650 mg    albuterol-ipratropium 2.5mg-0.5mg/3mL nebulizer solution 3 mL    albuterol-ipratropium 2.5mg-0.5mg/3mL nebulizer solution 3 mL    aluminum-magnesium hydroxide-simethicone 200-200-20 mg/5 mL suspension 30 mL    arformoterol nebulizer solution 15 mcg    atorvastatin tablet 20 mg    budesonide nebulizer solution 0.5 mg    cefTRIAXone (ROCEPHIN) 1 g in dextrose 5 % 50 mL IVPB (ready to mix system)    chlordiazepoxide capsule 50 mg    dexmedetomidine (PRECEDEX) 400mcg/100mL 0.9% NaCL infusion    enoxaparin injection 40 mg    haloperidol lactate injection 5 mg    lactulose 20 gram/30 mL solution Soln 15 g    lorazepam injection 2 mg    nicotine 21 mg/24 hr 1 patch    ondansetron injection 4 mg    pantoprazole injection 40 mg     No current facility-administered medications on file prior to encounter.      Current Outpatient Prescriptions on File Prior to Encounter   Medication Sig    amitriptyline (ELAVIL) 50 MG tablet Take 1 tablet (50 mg total) by mouth every evening. (Patient taking differently: Take 25 mg by mouth 3 (three) times daily. )    lorazepam (ATIVAN) 0.5 MG tablet Take 0.5 mg by mouth every 6 (six) hours as needed for Anxiety.    montelukast (SINGULAIR) 10 mg tablet Take 10 mg by mouth every evening.    paroxetine (PAXIL) 20 MG tablet Take 40 mg by mouth every morning.     predniSONE (DELTASONE) 10 MG tablet Please take 20mg twice daily x 3 days, then take 10 mg twice daily x 2 days, then take 10 mg once daily x 2 days    ropinirole (REQUIP) 1 MG tablet Take 1 mg by mouth every evening.    b complex vitamins tablet Take 1 tablet by mouth once daily.    docusate sodium (COLACE) 100 MG capsule Take 1 capsule (100 mg total) by mouth 2 (two) times daily.    fluticasone (FLONASE) 50 mcg/actuation nasal spray 2 sprays by Each Nare route once daily.    mometasone-formoterol (DULERA) 200-5 mcg/actuation inhaler Inhale 2 puffs into the lungs 2 (two) times  daily.    nicotine (NICODERM CQ) 21 mg/24 hr Place 1 patch onto the skin once daily.       Physical Exam:  General appearance: sedated, appears stated age and cooperative  Head: Normocephalic, without obvious abnormality, atraumatic  Eyes:  conjunctivae/corneas clear. PERRL, EOM's intact. Fundi benign.  Nose: no discharge  Throat: normal findings: tongue midline and normal  Neck: no adenopathy, no carotid bruit, no JVD, supple, symmetrical, trachea midline and thyroid not enlarged, symmetric, no tenderness/mass/nodules  Back:  no skin lesions, erythema, or scars  Lungs:  clear to auscultation bilaterally  Chest wall: no tenderness  Heart: regular rate and rhythm, S1, S2 normal, no murmur, click, rub or gallop  Abdomen: soft, non-tender; bowel sounds normal; no masses,  no organomegaly  Extremities: extremities normal, atraumatic, no cyanosis or edema  Pulses: 1+ and symmetric  Skin: Skin color, texture, turgor normal. No rashes or lesions      Laboratory:  Chemistry:   Lab Results   Component Value Date     (H) 05/19/2017    K 3.8 05/19/2017     05/19/2017    CO2 29 05/19/2017    BUN 24 (H) 05/19/2017    CREATININE 0.7 05/19/2017    CALCIUM 8.0 (L) 05/19/2017     Cardiac Markers:   Lab Results   Component Value Date    TROPONINI 0.128 (H) 05/19/2017     Cardiac Markers (Last 3):   Lab Results   Component Value Date    TROPONINI 0.128 (H) 05/19/2017    TROPONINI 0.021 05/14/2017    TROPONINI 0.009 12/22/2016     CBC:   Lab Results   Component Value Date    WBC 8.41 05/19/2017    HGB 11.6 (L) 05/19/2017    HCT 36.7 (L) 05/19/2017     (H) 05/19/2017     05/19/2017     Lipids: No results found for: CHOL, TRIG, HDL, LDLDIRECT  Coagulation:   Lab Results   Component Value Date    INR 1.0 05/14/2017       Diagnostic Results:  ECG: Reviewed  X-Ray: Reviewed  US: Reviewed  CT: Reviewed  Echo: Reviewed      ASSESSMENT/PLAN:     Patient Active Problem List   Diagnosis    Closed fracture of proximal  end of left humerus    Fracture of proximal humerus    Pneumonia    Depression    Anxiety    Bipolar disorder    COPD, severe    Tobacco abuse    Abdominal pain    Lung infiltrate    COPD exacerbation    Acute on chronic respiratory failure with hypoxia    Pneumonia involving right lung    Delirium due to another medical condition, acute, hyperactive    LFT elevation        Plan:     Will continue current medications and treatment plan  Start Heparin drip if not contraindications   Continue Statin and ASA  Smoking cessation     Chart reviewed. Dr. Juarez agrees with plan as outlined above.

## 2017-05-19 NOTE — PROGRESS NOTES
Progress note  Neurology      Neurology follow up:  For:Mental status changes    SUBJECTIVE:      HPI:   Last night she was agitated and needed extra sedative to clam her down.This afternoon, she is much better, off bipap. Alert but mildly drowsy. Denied any pain or discomfort. Understanding is clear.        Past Medical History:   Diagnosis Date    Anxiety     Bipolar disorder     COPD (chronic obstructive pulmonary disease)     Depression     Respiratory distress     SOB (shortness of breath)      Past Surgical History:   Procedure Laterality Date    COLONOSCOPY      TUBAL LIGATION       Family History   Problem Relation Age of Onset    COPD Mother     Cancer Father      brain    Aneurysm Brother     Suicide Son     No Known Problems Son     No Known Problems Son      Social History   Substance Use Topics    Smoking status: Current Every Day Smoker     Packs/day: 0.50     Years: 35.00     Types: Cigarettes    Smokeless tobacco: None      Comment: decreasing amount of cigarettes    Alcohol use Yes      Comment: occassionally       Review of patient's allergies indicates:   Allergen Reactions    Codeine Itching    Codeine phosphate      Itchy (skin)^      Patient Active Problem List   Diagnosis    Closed fracture of proximal end of left humerus    Fracture of proximal humerus    Pneumonia    Depression    Anxiety    Bipolar disorder    COPD, severe    Tobacco abuse    Abdominal pain    Lung infiltrate    COPD exacerbation    Acute on chronic respiratory failure with hypoxia    Pneumonia involving right lung    Delirium due to another medical condition, acute, hyperactive    LFT elevation         Scheduled Meds:   albuterol-ipratropium 2.5mg-0.5mg/3mL  3 mL Nebulization Q6H    arformoterol  15 mcg Nebulization Q12H    atorvastatin  20 mg Oral Daily    budesonide  0.5 mg Nebulization Q12H    cefTRIAXone (ROCEPHIN) 1 g in dextrose 5 % 50 mL IVPB (ready to mix system)  1 g  Intravenous Q24H    chlordiazepoxide  50 mg Oral QID    enoxaparin  40 mg Subcutaneous Daily    lactulose  15 g Oral TID    nicotine  1 patch Transdermal Daily    pantoprazole  40 mg Intravenous Daily     Continuous Infusions:   sodium chloride 0.9% Stopped (05/19/17 1107)    dexmedetomidine (PRECEDEX) infusion Stopped (05/19/17 1105)     PRN Meds:acetaminophen, albuterol-ipratropium 2.5mg-0.5mg/3mL, aluminum-magnesium hydroxide-simethicone, haloperidol lactate, lorazepam, ondansetron      Review of Systems   Constitutional: Negative for fever and weight loss.   HENT: Negative for hearing loss.    Eyes: Negative for blurred vision, double vision, photophobia and pain.   Respiratory: Positive for shortness of breath.    Cardiovascular: Negative for chest pain.   Gastrointestinal: Negative for abdominal pain, nausea and vomiting.   Genitourinary: Negative for dysuria, frequency and urgency.   Musculoskeletal: Negative.  Negative for back pain, falls, joint pain, myalgias and neck pain.   Skin: Negative for itching and rash.   Neurological: Negative for tingling and headaches.        Drowsiness but alert. Arousable.   Psychiatric/Behavioral: Negative for depression and memory loss.           OBJECTIVE:     Vital Signs (Most Recent)  Temp: 98 °F (36.7 °C) (05/19/17 1503)  Pulse: 110 (05/19/17 1503)  Resp: 17 (05/19/17 1503)  BP: (!) 122/98 (05/19/17 1503)  SpO2: 100 % (05/19/17 1503)    Vital Signs Range (Last 24H):  Temp:  [97.6 °F (36.4 °C)-98.1 °F (36.7 °C)]   Pulse:  []   Resp:  [14-43]   BP: ()/()   SpO2:  [91 %-100 %]       Physical Exam   Constitutional: She is oriented to person, place, and time. She appears well-developed and well-nourished.   HENT:   Head: Normocephalic and atraumatic.   Eyes: Conjunctivae and EOM are normal. Pupils are equal, round, and reactive to light.   Neck: Normal range of motion. Neck supple. No JVD present. No tracheal deviation present. No thyromegaly  present.   Cardiovascular: Normal rate, regular rhythm and normal heart sounds.    Pulmonary/Chest: Effort normal and breath sounds normal.   Abdominal: She exhibits no distension. There is no tenderness.   Musculoskeletal: Normal range of motion. She exhibits no edema or tenderness.   Neurological: She is alert and oriented to person, place, and time. She has normal strength and normal reflexes. She is not disoriented. She displays normal reflexes. No cranial nerve deficit or sensory deficit. She exhibits normal muscle tone. She displays a negative Romberg sign. She displays no seizure activity. Coordination normal. GCS eye subscore is 4. GCS verbal subscore is 5. GCS motor subscore is 6.   Reflex Scores:       Tricep reflexes are 2+ on the right side and 2+ on the left side.       Bicep reflexes are 2+ on the right side and 2+ on the left side.       Brachioradialis reflexes are 2+ on the right side and 2+ on the left side.       Patellar reflexes are 2+ on the right side and 2+ on the left side.       Achilles reflexes are 2+ on the right side and 2+ on the left side.  She talked and no  Trouble in speaking.Moved all her extremities symmetrically.   Skin: Skin is warm and dry. No rash noted.   Psychiatric: Thought content normal. Her speech is delayed. She is slowed. Cognition and memory are impaired. She exhibits a depressed mood. She is inattentive.       Strength  Deltoids Triceps Biceps Wrist Extension Wrist Flexion Hand    Upper: R 4/5 4/5 4/5 4/5 4/5 4/5    L 4/5 4/5 4/5 4/5 4/5 4/5     Iliopsoas Quadriceps Knee  Flexion Tibialis  anterior Gastro- cnemius EHL   Lower: R 4/5 4/5 4/5 4/5 4/5 4/5    L 4/5 4/5 4/5 4/5 4/5 4/5     Her motor weakness is due to possibly due to lack of efforts. She is sleepy with sedation.    Laboratory:  Lab Results   Component Value Date    WBC 8.41 05/19/2017    HGB 11.6 (L) 05/19/2017    HCT 36.7 (L) 05/19/2017     05/19/2017     (H) 05/19/2017     (H)  05/19/2017     (H) 05/19/2017    K 3.8 05/19/2017     05/19/2017    CREATININE 0.7 05/19/2017    BUN 24 (H) 05/19/2017    CO2 29 05/19/2017    INR 1.0 05/14/2017               ASSESSMENT/PLAN:     Assessment:   1. Metabolic encephalopathy , doubt cerebrovascular accident. She is improving . There is no focal or lateralized neurological deficits on examination.    Patient Active Problem List   Diagnosis    Closed fracture of proximal end of left humerus    Fracture of proximal humerus    Pneumonia    Depression    Anxiety    Bipolar disorder    COPD, severe    Tobacco abuse    Abdominal pain    Lung infiltrate    COPD exacerbation    Acute on chronic respiratory failure with hypoxia    Pneumonia involving right lung    Delirium due to another medical condition, acute, hyperactive    LFT elevation         Plan:  Will do EEG of the brain .

## 2017-05-19 NOTE — PROGRESS NOTES
Received report from YAHAIRA Garcia.  Upon first turn, patient noted to have small blister to coccyx/gluteal cleft, not assessed yesterday.  Patient however received a fluid bolus over the course of the night d/t decrease in UOP, and maintenance IVF started during the night.  Patient more edematous than yesterday, with the blister being more pronounced.  Wound care to see patient.  Assessment completed per flowsheet.

## 2017-05-19 NOTE — PROGRESS NOTES
"   05/19/17 1048   Wound Care Screen Assessment   Mobility Bedbound   Continent of Bowel No   Continent of Bladder No   Contractures No   Previous History of pressure ulcers No   Feeding Tube No   S/P Flap or Graft Surgery No   Skin Condition coccyx blister   Wound(s) Present Yes   Wound acquired during current hospitalization Yes   Evaluation by Wound Care Team required? Yes     Wound Care Screen completed due to documented Haider Scale score <= 14. Patient assessed during bath provided by MARION Carter.  Bilateral heels and elbows intact. Scattered bruises noted.  Upper intergluteal cleft/coccyx area with stage 2 pressure injury presenting as a 1x0.5 cm serous fluid filled blister.  Surrounding tissue reddened and blanchable.  Likely due to friction and shear.  Site cleansed with soap and water, and intact skin painted with cavilon skin barrier.  Covered with Mepilex Sacral dressing.  Wound care recommends the following for Pressure Injury Prevention:  Skin Care Precautions / Pressure Injury Prevention:  1. Follow "Guidelines for Prevention of Pressure Ulcers in at Risk Patients"  2. Document wound assessment in [facility] using guidelines in Elza's "Assessment : Wound" procedure  3. Limit the amount of linen/underpad between patient and mattress surface to ONE fitted sheet and ONE covidien underpad - NO draw sheet/briefs.  4. Obtain Easi Cleans Foam Wipes for providing damon care - avoid the use of wash cloths to areas affected by IAD.  5. Apply Clear Barrier Ointment to perineal / perirectal areas in a thin even layer to clean dry skin BID and after each episode of pericare  6. Apply sween 24 moisturizer cream to all dry skin after daily bath and prn  7. Obtain foam wedge from materials management to assist with maintaining proper position changes at least q 2hours and document actual position in [facility] q 2hours  8. Please elevate heels off mattress and document in the frequent checks section of DOC Flow Sheets every 2 " hours.  9. .Do NOT elevate HOB greater than 30 degrees unless contraindicated.

## 2017-05-19 NOTE — CONSULTS
Consult Note  Cardiology    Consult Requested By: Dr Santiago  Reason for Consult: EKG changes     SUBJECTIVE:     History of Present Illness:  Patient is a 57 y.o. female presents with PMHx of COPD, tobacco use, bipolar disorder and depression who was admitted to [facility] on 5/14/2017 with COPD exacerbation. Pt currently on Bipap and sedated, no family present, information obtain from medical records. She becomes increasingly anxious with shortness of breath, requiring Precedex and Bipap. EKG this AM: Normal sinus rhythm, ST and Marked T wave abnormality. Troponin this AM, 0.128.     Review of patient's allergies indicates:   Allergen Reactions    Codeine Itching    Codeine phosphate      Itchy (skin)^       Past Medical History:   Diagnosis Date    Anxiety     Bipolar disorder     COPD (chronic obstructive pulmonary disease)     Depression     Respiratory distress     SOB (shortness of breath)      Past Surgical History:   Procedure Laterality Date    COLONOSCOPY      TUBAL LIGATION       Family History   Problem Relation Age of Onset    COPD Mother     Cancer Father      brain    Aneurysm Brother     Suicide Son     No Known Problems Son     No Known Problems Son      Social History   Substance Use Topics    Smoking status: Current Every Day Smoker     Packs/day: 0.50     Years: 35.00     Types: Cigarettes    Smokeless tobacco: None      Comment: decreasing amount of cigarettes    Alcohol use Yes      Comment: occassionally        Review of Systems:  Unable to obtain, pt sedated on Bipap    OBJECTIVE:     Vital Signs (Most Recent)  Temp: 97.6 °F (36.4 °C) (05/19/17 1105)  Pulse: 86 (05/19/17 1308)  Resp: 14 (05/19/17 1308)  BP: 109/87 (05/19/17 1300)  SpO2: 96 % (05/19/17 1308)    Vital Signs Range (Last 24H):  Temp:  [97.6 °F (36.4 °C)-98.1 °F (36.7 °C)]   Pulse:  []   Resp:  [14-43]   BP: ()/()   SpO2:  [91 %-100 %]     Current Facility-Administered Medications    Medication    0.9%  NaCl infusion    acetaminophen tablet 650 mg    albuterol-ipratropium 2.5mg-0.5mg/3mL nebulizer solution 3 mL    albuterol-ipratropium 2.5mg-0.5mg/3mL nebulizer solution 3 mL    aluminum-magnesium hydroxide-simethicone 200-200-20 mg/5 mL suspension 30 mL    arformoterol nebulizer solution 15 mcg    atorvastatin tablet 20 mg    budesonide nebulizer solution 0.5 mg    cefTRIAXone (ROCEPHIN) 1 g in dextrose 5 % 50 mL IVPB (ready to mix system)    chlordiazepoxide capsule 50 mg    dexmedetomidine (PRECEDEX) 400mcg/100mL 0.9% NaCL infusion    enoxaparin injection 40 mg    haloperidol lactate injection 5 mg    lactulose 20 gram/30 mL solution Soln 15 g    lorazepam injection 2 mg    nicotine 21 mg/24 hr 1 patch    ondansetron injection 4 mg    pantoprazole injection 40 mg     No current facility-administered medications on file prior to encounter.      Current Outpatient Prescriptions on File Prior to Encounter   Medication Sig    amitriptyline (ELAVIL) 50 MG tablet Take 1 tablet (50 mg total) by mouth every evening. (Patient taking differently: Take 25 mg by mouth 3 (three) times daily. )    lorazepam (ATIVAN) 0.5 MG tablet Take 0.5 mg by mouth every 6 (six) hours as needed for Anxiety.    montelukast (SINGULAIR) 10 mg tablet Take 10 mg by mouth every evening.    paroxetine (PAXIL) 20 MG tablet Take 40 mg by mouth every morning.     predniSONE (DELTASONE) 10 MG tablet Please take 20mg twice daily x 3 days, then take 10 mg twice daily x 2 days, then take 10 mg once daily x 2 days    ropinirole (REQUIP) 1 MG tablet Take 1 mg by mouth every evening.    b complex vitamins tablet Take 1 tablet by mouth once daily.    docusate sodium (COLACE) 100 MG capsule Take 1 capsule (100 mg total) by mouth 2 (two) times daily.    fluticasone (FLONASE) 50 mcg/actuation nasal spray 2 sprays by Each Nare route once daily.    mometasone-formoterol (DULERA) 200-5 mcg/actuation inhaler Inhale 2  puffs into the lungs 2 (two) times daily.    nicotine (NICODERM CQ) 21 mg/24 hr Place 1 patch onto the skin once daily.       Physical Exam:  General appearance: sedated, appears stated age and cooperative  Head: Normocephalic, without obvious abnormality, atraumatic  Eyes:  conjunctivae/corneas clear. PERRL, EOM's intact. Fundi benign.  Nose: no discharge  Throat: normal findings: tongue midline and normal  Neck: no adenopathy, no carotid bruit, no JVD, supple, symmetrical, trachea midline and thyroid not enlarged, symmetric, no tenderness/mass/nodules  Back:  no skin lesions, erythema, or scars  Lungs:  clear to auscultation bilaterally  Chest wall: no tenderness  Heart: regular rate and rhythm, S1, S2 normal, no murmur, click, rub or gallop  Abdomen: soft, non-tender; bowel sounds normal; no masses,  no organomegaly  Extremities: extremities normal, atraumatic, no cyanosis or edema  Pulses: 1+ and symmetric  Skin: Skin color, texture, turgor normal. No rashes or lesions      Laboratory:  Chemistry:   Lab Results   Component Value Date     (H) 05/19/2017    K 3.8 05/19/2017     05/19/2017    CO2 29 05/19/2017    BUN 24 (H) 05/19/2017    CREATININE 0.7 05/19/2017    CALCIUM 8.0 (L) 05/19/2017     Cardiac Markers:   Lab Results   Component Value Date    TROPONINI 0.128 (H) 05/19/2017     Cardiac Markers (Last 3):   Lab Results   Component Value Date    TROPONINI 0.128 (H) 05/19/2017    TROPONINI 0.021 05/14/2017    TROPONINI 0.009 12/22/2016     CBC:   Lab Results   Component Value Date    WBC 8.41 05/19/2017    HGB 11.6 (L) 05/19/2017    HCT 36.7 (L) 05/19/2017     (H) 05/19/2017     05/19/2017     Lipids: No results found for: CHOL, TRIG, HDL, LDLDIRECT  Coagulation:   Lab Results   Component Value Date    INR 1.0 05/14/2017       Diagnostic Results:  ECG: Reviewed  X-Ray: Reviewed  US: Reviewed  CT: Reviewed  Echo: Reviewed      ASSESSMENT/PLAN:     Patient Active Problem List    Diagnosis    Closed fracture of proximal end of left humerus    Fracture of proximal humerus    Pneumonia    Depression    Anxiety    Bipolar disorder    COPD, severe    Tobacco abuse    Abdominal pain    Lung infiltrate    COPD exacerbation    Acute on chronic respiratory failure with hypoxia    Pneumonia involving right lung    Delirium due to another medical condition, acute, hyperactive    LFT elevation      Patient presents with EKG changes since admit to [facility] on 5/14/2017.   Troponin mildly elevated, may be related to COPD and hypoxia.      Plan:     Will continue current medications and treatment plan  Start Heparin drip if no contraindications   Continue to monitor serial troponin   Check 2 D Echo   Continue Statin and ASA  Smoking cessation     Chart reviewed. Dr. Juarez agrees with plan as outlined above.

## 2017-05-19 NOTE — ASSESSMENT & PLAN NOTE
Her episodes of confusion and agitation are unclear.  She was not hypoxic and only slightly hypercapneic.  She has a history of depression and misuse of medication and alcohol.  Her  has been consistent, helpful, and seems reliable in thinking she has not been drinking heavily or consistently in the recent weeks. CT head is negative for an acute intracranial process. Neurology has been consulted. Will discuss possible MRI or EEG. Ammonia level has normalized.

## 2017-05-19 NOTE — ASSESSMENT & PLAN NOTE
Continuing only Paroxetine.  Careful use of Benzodiazepines with severe COPD. PRN Ativan for agitation. Hold Precedex.

## 2017-05-19 NOTE — PROGRESS NOTES
1905 Assumed client care at this time.  Patient extremely anxious, pulling at lines and attempting to get out of bed.  Patient disoriented x 4, but will follow commands.  PRN Ativan and Haldol given by day RN with little effect so far.  Pt currently sinus tach in 140's, all other VSS.  EICU made aware of situation.      1930 Bilat soft wrist restraints ordered and applied at this time.  ABG ordered and results reported to EICU MD.      2015 Patient still agitated.  Precedex ordered and started at this time.  EICU made aware.  Will continue to monitor.   at bedside and updated to patient status.     2055 Precedex infusing at 0.6 mcg/kg/hr and currently patient resting with eyes closed, resp. Even and unlabored.  Sinus tach 118.  EICU updated to patient status.     0603 EKG this AM reveals t wave changes, EICU reviewed and will consult cardiology this AM.  Patient not complaining of any chest pain this shift.  MD Paulson notified of EKG and consult via secure chat.  MD Juarez will see this AM.  Patient has remained hemodynamically stable during the night without pressors, and no complaints of chest pain.

## 2017-05-19 NOTE — PROGRESS NOTES
[facility] Hospital Medicine  Progress Note    Patient Name: Jeniffer Fernandez  MRN: [id number]  Patient Class: IP- Inpatient   Admission Date: 5/14/2017  Length of Stay: 5 days  Attending Physician: Adin Sanchez MD  Primary Care Provider: Ben Pelaez Jr, MD        Subjective:     Principal Problem:Acute on chronic respiratory failure with hypoxia    HPI:  Jeniffer Fernandez is a 58 yo female + smoker with COPD and chronic respiratory failure who presented with worsening SOB over 1 week. For 5 days she had worn oxygen continuously. She describes frequent productive cough, wheezing, SOB, generalized weakness and FRANCES. Prior to presentation she had subjective fever and sweats. Pt has had several neb treatments and still having SOB. In the ED, ABG reflects acute on chronic hypoxic respiratory failure and O2 sat 87 %. CTA of Chest indicated a patchy RUL/RLL interstitial infiltrate.     Hospital Course:  Very agitated late morning 16 May and received Haldol and Ativan and slept most of the day.  She was better the following morning but became more confused and agitated in the evening and combative with staff.  Increased wheezing and pulling her oxygen off with desaturations.  When stable her blood gas showed adequate oxygenation and a pCO2 only slightly high at 50.  Transferred to ICU for respiratory failure. Was started on Precedex that has now been discontinued.    5/18 - Continued to have intermittent periods of agitation. Negative head CT and Neurology consult placed.      Interval History: Yesterday evening she had another episode of     Review of Systems   Unable to perform ROS: Mental status change     Objective:     Vital Signs (Most Recent):  Temp: 97.6 °F (36.4 °C) (05/19/17 1105)  Pulse: 86 (05/19/17 1308)  Resp: 14 (05/19/17 1308)  BP: 109/87 (05/19/17 1300)  SpO2: 96 % (05/19/17 1308) Vital Signs (24h Range):  Temp:  [97.6 °F (36.4 °C)-98.1 °F (36.7 °C)] 97.6 °F (36.4 °C)  Pulse:  []  86  Resp:  [14-43] 14  SpO2:  [91 %-100 %] 96 %  BP: ()/() 109/87     Weight: 48.1 kg (106 lb 0.7 oz)  Body mass index is 18.2 kg/(m^2).    Intake/Output Summary (Last 24 hours) at 05/19/17 1348  Last data filed at 05/19/17 1300   Gross per 24 hour   Intake          2182.74 ml   Output             1145 ml   Net          1037.74 ml      Physical Exam   Constitutional: No distress.   HENT:   Head: Normocephalic and atraumatic.   Eyes: EOM are normal. Pupils are equal, round, and reactive to light.   Neck: No tracheal deviation present. No thyromegaly present.   Cardiovascular: Normal rate, regular rhythm and normal heart sounds.  Exam reveals no friction rub.    No murmur heard.  Pulmonary/Chest: Effort normal. No respiratory distress. She has wheezes. She has no rales.   Abdominal: Soft. Bowel sounds are normal. She exhibits no distension. There is no tenderness.   Musculoskeletal: She exhibits no edema or tenderness.   Neurological:   Obtunded   Skin: Skin is warm and dry.     Significant Labs:   A1C: No results for input(s): HGBA1C in the last 4320 hours.  ABGs:   Recent Labs  Lab 05/19/17  0542   PH 7.357   PCO2 49.1*   HCO3 27.5   POCSATURATED 96   BE 2     Bilirubin:   Recent Labs  Lab 05/15/17  0604 05/18/17 0442 05/19/17  0409   BILIDIR  --   --  0.1   BILITOT 0.2 0.3 0.3     Blood Culture: No results for input(s): LABBLOO in the last 48 hours.  BMP:   Recent Labs  Lab 05/19/17  0409 05/19/17  0807   GLU  --  107   NA  --  146*   K  --  3.8   CL  --  108   CO2  --  29   BUN  --  24*   CREATININE  --  0.7   CALCIUM  --  8.0*   MG 2.5  --      CBC:   Recent Labs  Lab 05/18/17  0442 05/19/17  0807   WBC 7.87 8.41   HGB 11.4* 11.6*   HCT 35.4* 36.7*    188     CMP:   Recent Labs  Lab 05/18/17  0442 05/19/17  0409 05/19/17  0807     --  146*   K 4.3  --  3.8     --  108   CO2 30*  --  29   *  --  107   BUN 21*  --  24*   CREATININE 0.7  --  0.7   CALCIUM 8.5*  --  8.0*   PROT  5.6* 5.8*  --    ALBUMIN 3.0* 3.2*  --    BILITOT 0.3 0.3  --    ALKPHOS 86 104  --    * 150*  --    * 203*  --    ANIONGAP 8  --  9   EGFRNONAA >60  --  >60     Cardiac Markers: No results for input(s): CKMB, MYOGLOBIN, BNP, TROPISTAT in the last 48 hours.  Coagulation: No results for input(s): INR, APTT in the last 48 hours.    Invalid input(s): PT  Lactic Acid: No results for input(s): LACTATE in the last 48 hours.    Significant Imaging:   Imaging Results         X-Ray Chest 1 View (Final result) Result time:  05/18/17 11:49:18    Final result by James Caruso MD (05/18/17 11:49:18)    Impression:      Please see above.      Electronically signed by: JAMES CARUSO MD  Date:     05/18/17  Time:    11:49     Narrative:    Exam: XR CHEST 1 VIEW,    Date:  05/18/17 11:04:25    History: NG tube placement    Comparison:  Earlier film from the same day.    Findings: Nasogastric tube has been placed with the distal tip well below the GE junction in good position.  Otherwise no change.            CT Head Without Contrast (Final result) Result time:  05/18/17 10:46:24    Final result by James Caruso MD (05/18/17 10:46:24)    Impression:         Negative noncontrast CT scan of the brain.         All CT scans at this facility use dose modulation, iterative reconstruction, and/or weight based dosing when appropriate to reduce radiation dose to as low as reasonably achievable.       Electronically signed by: JAMES CARUSO MD  Date:     05/18/17  Time:    10:46     Narrative:    CT HEAD WITHOUT CONTRAST    Date: 05/18/17 10:36:40    Clinical indication: Altered mental status.  Nonresponsive patient.     Technique:  Standard noncontrast CT scan of the brain. No previous for comparison.     Findings:  The ventricles are nondilated. Gray and white matter structures reveal normal attenuation. No hemorrhage, mass effect or edema is identified.     The skull and skull base are unremarkable.             X-Ray Chest 1 View (Final result) Result time:  05/18/17 08:02:46    Final result by James Caruso MD (05/18/17 08:02:46)    Impression:      Please see above.      Electronically signed by: JAMES CARUSO MD  Date:     05/18/17  Time:    08:02     Narrative:    Exam: XR CHEST 1 VIEW,    Date:  05/18/17 05:34:00    History: SOB    Comparison:  05/14/2017.    Findings: Heart size is normal.  No pneumothorax.  Reticulonodular lung disease is slightly improved.  Postoperative changes left humeral neck.            CTA Chest Non-Coronary (Final result) Result time:  05/14/17 10:56:46    Final result by Dustin Caal MD (05/14/17 10:56:46)    Impression:      Negative for pulmonary embolus.    Patchy right upper lobe and right lower lobe interstitial infiltrate/pneumonia with some nodularity at the dependent right lower lobe.  No pleural effusion.  Short term imaging followup after appropriate treatment is recommended.    Mediastinal adenopathy, perhaps reactive.  Attention on followup.    Emphysema.    Moderate stenosis proximal celiac artery.          All CT scans at this facility use dose modulation, iterative reconstruction, and/or weight based dosing when appropriate to reduce radiation dose to as low as reasonably achievable.      Electronically signed by: DUSTIN CAAL MD  Date:     05/14/17  Time:    10:56     Narrative:    EXAM: CTA CHEST NON CORONARY    CLINICAL HISTORY: shortness of breath    TECHNIQUE: CT angiogram performed of the chest following IV contrast administration to evaluate for pulmonary emboli. Multiplanar MIP reformations performed.    COMPARISON STUDIES: Chest x-ray May 14, 2017    FINDINGS:  No evidence of pulmonary embolus.  Minimal aortic atherosclerosis without aneurysm or dissection.    Marked emphysematous changes in the lungs with apical and paraseptal bullous changes.  Patchy interstitial infiltrate posterior right upper lobe and posterior right lower lobe.  Some areas of  nodularity in the dependent right lung base within this area of interstitial thickening with largest area of nodularity measuring 1.9 cm.    Numerous enlarged mediastinal lymph nodes are noted with a representative subcarinal lymph node measuring 1.4 x 1.3 cm.    Right hepatic lobe cyst.  Moderate narrowing proximal segment celiac artery.            X-Ray Chest 1 View (Final result) Result time:  05/14/17 09:11:48    Final result by David Caal MD (05/14/17 09:11:48)    Impression:      Diffuse chronic interstitial prominence with improving right upper lobe infiltrate with a small amount of residual right upper lobe interstitial markings remaining from prior chest x-ray.  Continued short-term followup recommended.      Electronically signed by: DAVID CAAL MD  Date:     05/14/17  Time:    09:11     Narrative:    EXAM: XR CHEST 1 VIEW    CLINICAL HISTORY: Shortness of breath.    COMPARISON STUDIES: December 22, 2016    FINDINGS:  The cardiomediastinal silhouette is within normal limits.  Diffuse mild interstitial prominence throughout the lungs with minimal asymmetric markings in the right upper lobe, though improved from prior exam.  Surgical plate proximal left humerus.                Assessment/Plan:      Delirium due to another medical condition, acute, hyperactive  Her episodes of confusion and agitation are unclear.  She was not hypoxic and only slightly hypercapneic.  She has a history of depression and misuse of medication and alcohol.  Her  has been consistent, helpful, and seems reliable in thinking she has not been drinking heavily or consistently in the recent weeks. CT head is negative for an acute intracranial process. Neurology has been consulted. Will discuss possible MRI or EEG. Ammonia level has normalized.    * Acute on chronic respiratory failure with hypoxia  Supplemental oxygen to maintain sat > 92 % - wean as appropriate.  She has severe underlying COPD with most recent PFT in 2014  and has continued to smoke since then.  Pulmonary following.    Anxiety  Continuing only Paroxetine.  Careful use of Benzodiazepines with severe COPD. PRN Ativan for agitation. Hold Precedex.    COPD exacerbation  Supplemental oxygen  Xopenex  IV Corticosteroids    LFT elevation  - Check daily LFTs.  - Lactulose given yesterday and patient's ammonia level is not elevated now.    VTE Risk Mitigation         Ordered     enoxaparin injection 40 mg  Daily     Route:  Subcutaneous        05/14/17 1512     Medium Risk of VTE  Once      05/14/17 1132     Place sequential compression device  Until discontinued      05/14/17 1132        Adin Sanchez MD  Department of Hospital Medicine   [facility]

## 2017-05-19 NOTE — PLAN OF CARE
Problem: Patient Care Overview  Goal: Plan of Care Review  Outcome: Ongoing (interventions implemented as appropriate)  Patient resting on 3L NC at this time.  Precedex off mid-shift.  Librium started.  Rodriguez with poor UOP, MD aware.  Banana bag infusing.  Remains in restraints.  VSS.

## 2017-05-19 NOTE — PROGRESS NOTES
Dr. Grossman at the bedside.  He states there is no need for MRI, but will follow through with EEG.  Patient lethargic at this time, remains confused, but cooperative.  Off BiPAP and placed on 3LNC at this time.  Denies SOB and NADN.  Will continue to monitor patient.

## 2017-05-19 NOTE — PLAN OF CARE
Problem: Patient Care Overview  Goal: Plan of Care Review  Outcome: Ongoing (interventions implemented as appropriate)  Patient remains disoriented x4 since start of shift, but will follow commands when asked. Bilat soft wrist restraints and precedex started at beginning of shift due to extreme agitation, treatment interference and pt pulling at lines.  When bipap removed even for a short time pt does not desat via pulse oximetry, but states she cannot breathe.  Precedex titrated up as needed currently 0.6mcg/kg/hr, to achieve a RASS of 0.  Urine output marginal at appx 20-30mL/hr.  EICU kept updated to pt status during the night. Patient being turned q2 hours and making small changes independently. Patient and  updated to plan of care.

## 2017-05-19 NOTE — EICU
Noted T wave inversions on monitor.Patient asymptomatic    Ordered 12lead EKG which revealed st depressions throughout anterolateral leads.    Plan:  1.Aspirin 325mg tablet and atorvastatin 20 mg ordered  2.Cardiology evaluation

## 2017-05-19 NOTE — ASSESSMENT & PLAN NOTE
Supplemental oxygen to maintain sat > 92 % - wean as appropriate.  She has severe underlying COPD with most recent PFT in 2014 and has continued to smoke since then.  Pulmonary following.

## 2017-05-19 NOTE — PROGRESS NOTES
Pulmonary / Critical Care Progress Note    Called to patient bedside for delirium    Earlier today patient was calmer and was responding in an appropriate manner.  Possible sundowning effect    WBC is now normal.  Abnormalities of liver function noted  Will d/c ceftriaxone  Re-ordered precedex  Re-ordered restraints    Patient evaluated at the bedside. Chart reviewed and patient examined. Progress  discussed with critical care nurse and nursing update given.  While at the patient's bedside hemodynamic effects of medications monitored. Dosage of medications adjusted.   Family updated.    ASSESSMENT:  Delirium    Plan:  Stop all meds that may be contributing to delirium - d/s ceftrixone    Critical care time was exclusive of separately billable procedures and treating other patients.     Critical care was time spent personally by me on the following activities: development of treatment plan with patient or surrogate, discussions with consultants, evaluation of patient's response to treatment, examination of patient, obtaining history from patient or surrogate, ordering and performing treatments and interventions, ordering and review of laboratory studies, ordering and personal review of radiographic studies, pulse oximetry, Cardiac rhythm strips reviewed, re-evaluation of patient's condition, review of old charts    Critical Care time spent at bedside: 35 min    Ilan Plata MD  Pulmonary / Critical Care Medicine

## 2017-05-19 NOTE — EICU
Problem:Agitation with delirium inspite of ativan and haloperidol    Severe agitation and restlessness with disorientation appears to have withdrawal more than respiratory distress.  /MIN /100mm of Hg RR 35/MIN ON Bipap(on removing with O 2 NC 5l/min SpO2 85%)    PCO2 62.6 MM OF HG(was 52)    Assessment:  1.Possible drug and alcohol withdrawal(on antipsychotics at home)    Plan:  1.Restart Bipap Fio2 30% with albuterol nebs.  2.Dexmedetomidine infusion with titration   3.Thiamine infusion   4.Crystalloid replacement as needed  5.Creatinine kinase levels(AST/ALT elevation may be a result of muscle breakdown as total bilirubin is normal)    Will monitor

## 2017-05-19 NOTE — SUBJECTIVE & OBJECTIVE
Interval History: Yesterday evening she had another episode of     Review of Systems   Unable to perform ROS: Mental status change     Objective:     Vital Signs (Most Recent):  Temp: 97.6 °F (36.4 °C) (05/19/17 1105)  Pulse: 86 (05/19/17 1308)  Resp: 14 (05/19/17 1308)  BP: 109/87 (05/19/17 1300)  SpO2: 96 % (05/19/17 1308) Vital Signs (24h Range):  Temp:  [97.6 °F (36.4 °C)-98.1 °F (36.7 °C)] 97.6 °F (36.4 °C)  Pulse:  [] 86  Resp:  [14-43] 14  SpO2:  [91 %-100 %] 96 %  BP: ()/() 109/87     Weight: 48.1 kg (106 lb 0.7 oz)  Body mass index is 18.2 kg/(m^2).    Intake/Output Summary (Last 24 hours) at 05/19/17 1348  Last data filed at 05/19/17 1300   Gross per 24 hour   Intake          2182.74 ml   Output             1145 ml   Net          1037.74 ml      Physical Exam   Constitutional: No distress.   HENT:   Head: Normocephalic and atraumatic.   Eyes: EOM are normal. Pupils are equal, round, and reactive to light.   Neck: No tracheal deviation present. No thyromegaly present.   Cardiovascular: Normal rate, regular rhythm and normal heart sounds.  Exam reveals no friction rub.    No murmur heard.  Pulmonary/Chest: Effort normal. No respiratory distress. She has wheezes. She has no rales.   Abdominal: Soft. Bowel sounds are normal. She exhibits no distension. There is no tenderness.   Musculoskeletal: She exhibits no edema or tenderness.   Neurological:   Obtunded   Skin: Skin is warm and dry.     Significant Labs:   A1C: No results for input(s): HGBA1C in the last 4320 hours.  ABGs:   Recent Labs  Lab 05/19/17  0542   PH 7.357   PCO2 49.1*   HCO3 27.5   POCSATURATED 96   BE 2     Bilirubin:   Recent Labs  Lab 05/15/17  0604 05/18/17  0442 05/19/17  0409   BILIDIR  --   --  0.1   BILITOT 0.2 0.3 0.3     Blood Culture: No results for input(s): LABBLOO in the last 48 hours.  BMP:   Recent Labs  Lab 05/19/17  0409 05/19/17  0807   GLU  --  107   NA  --  146*   K  --  3.8   CL  --  108   CO2  --  29    BUN  --  24*   CREATININE  --  0.7   CALCIUM  --  8.0*   MG 2.5  --      CBC:   Recent Labs  Lab 05/18/17  0442 05/19/17  0807   WBC 7.87 8.41   HGB 11.4* 11.6*   HCT 35.4* 36.7*    188     CMP:   Recent Labs  Lab 05/18/17  0442 05/19/17  0409 05/19/17  0807     --  146*   K 4.3  --  3.8     --  108   CO2 30*  --  29   *  --  107   BUN 21*  --  24*   CREATININE 0.7  --  0.7   CALCIUM 8.5*  --  8.0*   PROT 5.6* 5.8*  --    ALBUMIN 3.0* 3.2*  --    BILITOT 0.3 0.3  --    ALKPHOS 86 104  --    * 150*  --    * 203*  --    ANIONGAP 8  --  9   EGFRNONAA >60  --  >60     Cardiac Markers: No results for input(s): CKMB, MYOGLOBIN, BNP, TROPISTAT in the last 48 hours.  Coagulation: No results for input(s): INR, APTT in the last 48 hours.    Invalid input(s): PT  Lactic Acid: No results for input(s): LACTATE in the last 48 hours.    Significant Imaging:   Imaging Results         X-Ray Chest 1 View (Final result) Result time:  05/18/17 11:49:18    Final result by James Caruso MD (05/18/17 11:49:18)    Impression:      Please see above.      Electronically signed by: JAMES CARUSO MD  Date:     05/18/17  Time:    11:49     Narrative:    Exam: XR CHEST 1 VIEW,    Date:  05/18/17 11:04:25    History: NG tube placement    Comparison:  Earlier film from the same day.    Findings: Nasogastric tube has been placed with the distal tip well below the GE junction in good position.  Otherwise no change.            CT Head Without Contrast (Final result) Result time:  05/18/17 10:46:24    Final result by James Caruso MD (05/18/17 10:46:24)    Impression:         Negative noncontrast CT scan of the brain.         All CT scans at this facility use dose modulation, iterative reconstruction, and/or weight based dosing when appropriate to reduce radiation dose to as low as reasonably achievable.       Electronically signed by: JAMES CARUSO MD  Date:     05/18/17  Time:    10:46      Narrative:    CT HEAD WITHOUT CONTRAST    Date: 05/18/17 10:36:40    Clinical indication: Altered mental status.  Nonresponsive patient.     Technique:  Standard noncontrast CT scan of the brain. No previous for comparison.     Findings:  The ventricles are nondilated. Gray and white matter structures reveal normal attenuation. No hemorrhage, mass effect or edema is identified.     The skull and skull base are unremarkable.            X-Ray Chest 1 View (Final result) Result time:  05/18/17 08:02:46    Final result by James Caruso MD (05/18/17 08:02:46)    Impression:      Please see above.      Electronically signed by: JAMES CARUSO MD  Date:     05/18/17  Time:    08:02     Narrative:    Exam: XR CHEST 1 VIEW,    Date:  05/18/17 05:34:00    History: SOB    Comparison:  05/14/2017.    Findings: Heart size is normal.  No pneumothorax.  Reticulonodular lung disease is slightly improved.  Postoperative changes left humeral neck.            CTA Chest Non-Coronary (Final result) Result time:  05/14/17 10:56:46    Final result by Dustin Caal MD (05/14/17 10:56:46)    Impression:      Negative for pulmonary embolus.    Patchy right upper lobe and right lower lobe interstitial infiltrate/pneumonia with some nodularity at the dependent right lower lobe.  No pleural effusion.  Short term imaging followup after appropriate treatment is recommended.    Mediastinal adenopathy, perhaps reactive.  Attention on followup.    Emphysema.    Moderate stenosis proximal celiac artery.          All CT scans at this facility use dose modulation, iterative reconstruction, and/or weight based dosing when appropriate to reduce radiation dose to as low as reasonably achievable.      Electronically signed by: DUSTIN CAAL MD  Date:     05/14/17  Time:    10:56     Narrative:    EXAM: CTA CHEST NON CORONARY    CLINICAL HISTORY: shortness of breath    TECHNIQUE: CT angiogram performed of the chest following IV contrast  administration to evaluate for pulmonary emboli. Multiplanar MIP reformations performed.    COMPARISON STUDIES: Chest x-ray May 14, 2017    FINDINGS:  No evidence of pulmonary embolus.  Minimal aortic atherosclerosis without aneurysm or dissection.    Marked emphysematous changes in the lungs with apical and paraseptal bullous changes.  Patchy interstitial infiltrate posterior right upper lobe and posterior right lower lobe.  Some areas of nodularity in the dependent right lung base within this area of interstitial thickening with largest area of nodularity measuring 1.9 cm.    Numerous enlarged mediastinal lymph nodes are noted with a representative subcarinal lymph node measuring 1.4 x 1.3 cm.    Right hepatic lobe cyst.  Moderate narrowing proximal segment celiac artery.            X-Ray Chest 1 View (Final result) Result time:  05/14/17 09:11:48    Final result by David Caal MD (05/14/17 09:11:48)    Impression:      Diffuse chronic interstitial prominence with improving right upper lobe infiltrate with a small amount of residual right upper lobe interstitial markings remaining from prior chest x-ray.  Continued short-term followup recommended.      Electronically signed by: DAVID CAAL MD  Date:     05/14/17  Time:    09:11     Narrative:    EXAM: XR CHEST 1 VIEW    CLINICAL HISTORY: Shortness of breath.    COMPARISON STUDIES: December 22, 2016    FINDINGS:  The cardiomediastinal silhouette is within normal limits.  Diffuse mild interstitial prominence throughout the lungs with minimal asymmetric markings in the right upper lobe, though improved from prior exam.  Surgical plate proximal left humerus.

## 2017-05-19 NOTE — PROGRESS NOTES
[facility] -   Critical Care Medicine  Progress Note    Patient Name: Jeniffer Fernandez  MRN: [id number]  Admission Date: 5/14/2017  Hospital Length of Stay: 5 days  Code Status: Full Code  Attending Provider: Adin Sanchez MD  Primary Care Provider: Ben Pelaez Jr, MD   Principal Problem: Acute on chronic respiratory failure with hypoxia    Subjective: still confused, less combative, requiring BiPAP     HPI: 57 y.o. female presents with exacerbation of chronic obstructive pulmonary disease and pneumonia - became confused disorientated. Called to assess. Onset of symptoms was abrupt starting earlier today with rapidly worsening course since that time. Patient unable to give history . Info obtained from  - he denies significant alcohol use. Symptoms are aggravated by stimulation . Symptoms improve with rest and haldol. No prior history of alcohol withdrawal  Hx of bipolar disorder    Hospital/ICU Course: transferred to ICU for respiratory failure and delirium. CT of head - negative. Neurology consulted    Interval History/Significant Events: Responding to benzodiazepines, suspect alcohol withdrawal in light of abnormal liver function . Will add MVI and start Delerium Tremens protocol    Review of Systems   Unable to perform ROS: Mental status change     Objective:     Vital Signs (Most Recent):  Temp: 97.8 °F (36.6 °C) (05/19/17 0705)  Pulse: 72 (05/19/17 0800)  Resp: 14 (05/19/17 0800)  BP: 95/63 (05/19/17 0800)  SpO2: 99 % (05/19/17 0800) Vital Signs (24h Range):  Temp:  [97.8 °F (36.6 °C)-98.1 °F (36.7 °C)] 97.8 °F (36.6 °C)  Pulse:  [] 72  Resp:  [14-43] 14  SpO2:  [91 %-100 %] 99 %  BP: ()/() 95/63     Weight: 48.1 kg (106 lb 0.7 oz)  Body mass index is 18.2 kg/(m^2).      Intake/Output Summary (Last 24 hours) at 05/19/17 1051  Last data filed at 05/19/17 0800   Gross per 24 hour   Intake          1393.41 ml   Output             1045 ml   Net           348.41 ml        Physical Exam   Constitutional: She appears well-developed and well-nourished.   HENT:   Head: Normocephalic and atraumatic.   Mouth/Throat: No oropharyngeal exudate.   Eyes: Conjunctivae are normal. Pupils are equal, round, and reactive to light.   Neck: Neck supple. No JVD present. No tracheal deviation present. No thyromegaly present.   Cardiovascular: Normal rate, regular rhythm and normal heart sounds.    Pulmonary/Chest: Effort normal. No respiratory distress. She has decreased breath sounds. She has wheezes in the right lower field and the left lower field. She has no rhonchi. She has no rales. She exhibits no tenderness.   Abdominal: Soft. Bowel sounds are normal.   Musculoskeletal: Normal range of motion. She exhibits no edema.   Lymphadenopathy:     She has no cervical adenopathy.   Neurological:   Confused , disorientated, delerious   Skin: Skin is warm and dry.   Nursing note and vitals reviewed.      Vents:  Oxygen Concentration (%): 30 (05/19/17 0734)    Lines/Drains/Airways     Drain                 NG/OG Tube 05/18/17 1052 [place] sump 16 Fr. Left nostril less than 1 day         Urethral Catheter 05/18/17 1415 Double-lumen less than 1 day          Peripheral Intravenous Line                 Peripheral IV - Single Lumen 05/18/17 1726 Right Wrist less than 1 day         Peripheral IV - Single Lumen 05/18/17 2142 Left Hand less than 1 day                Significant Labs:    CBC/Anemia Profile:    Recent Labs  Lab 05/18/17  0442 05/19/17  0807   WBC 7.87 8.41   HGB 11.4* 11.6*   HCT 35.4* 36.7*    188   * 103*   RDW 14.7* 14.9*        Chemistries:    Recent Labs  Lab 05/18/17  0442 05/19/17  0409 05/19/17  0807     --  146*   K 4.3  --  3.8     --  108   CO2 30*  --  29   BUN 21*  --  24*   CREATININE 0.7  --  0.7   CALCIUM 8.5*  --  8.0*   ALBUMIN 3.0* 3.2*  --    PROT 5.6* 5.8*  --    BILITOT 0.3 0.3  --    ALKPHOS 86 104  --    * 203*  --    * 150*  --    MG   --  2.5  --        Blood Culture: No results for input(s): LABBLOO in the last 48 hours.  BMP:   Recent Labs  Lab 05/19/17  0409 05/19/17  0807   GLU  --  107   NA  --  146*   K  --  3.8   CL  --  108   CO2  --  29   BUN  --  24*   CREATININE  --  0.7   CALCIUM  --  8.0*   MG 2.5  --      CMP:   Recent Labs  Lab 05/18/17  0442 05/19/17  0409 05/19/17  0807     --  146*   K 4.3  --  3.8     --  108   CO2 30*  --  29   *  --  107   BUN 21*  --  24*   CREATININE 0.7  --  0.7   CALCIUM 8.5*  --  8.0*   PROT 5.6* 5.8*  --    ALBUMIN 3.0* 3.2*  --    BILITOT 0.3 0.3  --    ALKPHOS 86 104  --    * 150*  --    * 203*  --    ANIONGAP 8  --  9   EGFRNONAA >60  --  >60     Respiratory Culture: No results for input(s): GSRESP, RESPIRATORYC in the last 48 hours.    Significant Imaging:  CXR: I have reviewed all pertinent results/findings within the past 24 hours and my personal findings are:  COPD    Assessment/Plan:     Active Diagnoses:    Diagnosis Date Noted POA    PRINCIPAL PROBLEM:  Acute on chronic respiratory failure with hypoxia [J96.21] 05/14/2017 Yes    Delirium due to another medical condition, acute, hyperactive [F05] 05/17/2017 No    Pneumonia involving right lung [J18.9] 05/14/2017 Yes    LFT elevation [R94.5] 05/18/2017 Yes    COPD exacerbation [J44.1] 05/14/2017 Yes    Bipolar disorder [F31.9] 12/22/2016 Yes    Anxiety [F41.9] 12/22/2016 Yes    Tobacco abuse [Z72.0] 12/22/2016 Yes      Problems Resolved During this Admission:    Diagnosis Date Noted Date Resolved POA        Critical Care Medicine Daily Checklist:    A: Awake: RASS Goal/Actual Goal: RASS Goal: 0-->alert and calm  Actual: Florian Agitation Sedation Scale (RASS): Agitated   B: Spontaneous Breathing Trial Performed?     C: SAT & SBT Coordinated?  na                      D: Delirium: CAM-ICU Overall CAM-ICU: Positive   E: Early Mobility Performed? No   F: Feeding Goal: Goals: Oral diet or nutrition support  within 3 days  Status: Nutrition Goal Status: new   Current Diet Order   Procedures    Diet NPO Except for: Medication, Sips with Medication, Ice Chips     Order Specific Question:   Except for     Answer:   Medication     Order Specific Question:   Except for     Answer:   Sips with Medication     Order Specific Question:   Except for     Answer:   Ice Chips      AS: Analgesia/Sedation Needs sedation   T: Thromboembolic Prophylaxis Y   H: HOB > 300 Yes   U: Stress Ulcer Prophylaxis (if needed) Y   G: Glucose Control adequate   B: Bowel Function     I: Indwelling Catheter (Lines & Rodriguez) Necessity needed   D: De-escalation of Antimicrobials/Pharmacotherapies na    Plan for the day/ETD  treatment of Shyam Black's    Code Status:  Family/Goals of Care: Full Code  Discussed, may need intubation     Critical Care Time: 50 min  Critical secondary to Patient has a condition that poses threat to life and bodily function: Severe Respiratory Distress and Psychiatric Illness with threat to self and others      Critical care was time spent personally by me on the following activities: development of treatment plan with patient or surrogate and bedside caregivers, discussions with consultants, evaluation of patient's response to treatment, examination of patient, ordering and performing treatments and interventions, ordering and review of laboratory studies, ordering and review of radiographic studies, pulse oximetry, re-evaluation of patient's condition.  This critical care time did not overlap with that of any other provider or involve time for any procedures.     Ilan Plata MD  Critical Care Medicine  [facility] -

## 2017-05-20 PROBLEM — I50.20 SYSTOLIC HEART FAILURE: Status: ACTIVE | Noted: 2017-05-20

## 2017-05-20 LAB
ALBUMIN SERPL BCP-MCNC: 2.8 G/DL
ALP SERPL-CCNC: 105 U/L
ALT SERPL W/O P-5'-P-CCNC: 178 U/L
AMMONIA PLAS-SCNC: 55 UMOL/L
ANION GAP SERPL CALC-SCNC: 11 MMOL/L
APTT BLDCRRT: 23.1 SEC
APTT BLDCRRT: 24.1 SEC
AST SERPL-CCNC: 91 U/L
BASOPHILS # BLD AUTO: 0.01 K/UL
BASOPHILS NFR BLD: 0.1 %
BILIRUB SERPL-MCNC: 0.3 MG/DL
BUN SERPL-MCNC: 22 MG/DL
CALCIUM SERPL-MCNC: 7.9 MG/DL
CHLORIDE SERPL-SCNC: 111 MMOL/L
CO2 SERPL-SCNC: 24 MMOL/L
CREAT SERPL-MCNC: 0.6 MG/DL
DIFFERENTIAL METHOD: ABNORMAL
EOSINOPHIL # BLD AUTO: 0 K/UL
EOSINOPHIL NFR BLD: 0.1 %
ERYTHROCYTE [DISTWIDTH] IN BLOOD BY AUTOMATED COUNT: 14.8 %
EST. GFR  (AFRICAN AMERICAN): >60 ML/MIN/1.73 M^2
EST. GFR  (NON AFRICAN AMERICAN): >60 ML/MIN/1.73 M^2
GLUCOSE SERPL-MCNC: 83 MG/DL
HCT VFR BLD AUTO: 35.4 %
HGB BLD-MCNC: 11.2 G/DL
INR PPP: 1
LYMPHOCYTES # BLD AUTO: 1.5 K/UL
LYMPHOCYTES NFR BLD: 18.4 %
MCH RBC QN AUTO: 32.4 PG
MCHC RBC AUTO-ENTMCNC: 31.6 %
MCV RBC AUTO: 102 FL
MONOCYTES # BLD AUTO: 0.7 K/UL
MONOCYTES NFR BLD: 9 %
NEUTROPHILS # BLD AUTO: 5.8 K/UL
NEUTROPHILS NFR BLD: 72.4 %
PLATELET # BLD AUTO: 210 K/UL
PMV BLD AUTO: 11.1 FL
POTASSIUM SERPL-SCNC: 4 MMOL/L
PROT SERPL-MCNC: 5.1 G/DL
PROTHROMBIN TIME: 10.8 SEC
RBC # BLD AUTO: 3.46 M/UL
SODIUM SERPL-SCNC: 146 MMOL/L
TROPONIN I SERPL DL<=0.01 NG/ML-MCNC: 0.1 NG/ML
WBC # BLD AUTO: 7.97 K/UL

## 2017-05-20 PROCEDURE — 85025 COMPLETE CBC W/AUTO DIFF WBC: CPT

## 2017-05-20 PROCEDURE — 36415 COLL VENOUS BLD VENIPUNCTURE: CPT

## 2017-05-20 PROCEDURE — 63600175 PHARM REV CODE 636 W HCPCS: Performed by: INTERNAL MEDICINE

## 2017-05-20 PROCEDURE — 85610 PROTHROMBIN TIME: CPT

## 2017-05-20 PROCEDURE — 84484 ASSAY OF TROPONIN QUANT: CPT

## 2017-05-20 PROCEDURE — 25000242 PHARM REV CODE 250 ALT 637 W/ HCPCS: Performed by: NURSE PRACTITIONER

## 2017-05-20 PROCEDURE — 82140 ASSAY OF AMMONIA: CPT

## 2017-05-20 PROCEDURE — 85730 THROMBOPLASTIN TIME PARTIAL: CPT | Mod: 91

## 2017-05-20 PROCEDURE — 99232 SBSQ HOSP IP/OBS MODERATE 35: CPT | Mod: ,,, | Performed by: INTERNAL MEDICINE

## 2017-05-20 PROCEDURE — C9113 INJ PANTOPRAZOLE SODIUM, VIA: HCPCS | Performed by: INTERNAL MEDICINE

## 2017-05-20 PROCEDURE — 63600175 PHARM REV CODE 636 W HCPCS: Performed by: HOSPITALIST

## 2017-05-20 PROCEDURE — 94660 CPAP INITIATION&MGMT: CPT

## 2017-05-20 PROCEDURE — 94640 AIRWAY INHALATION TREATMENT: CPT

## 2017-05-20 PROCEDURE — 20000000 HC ICU ROOM

## 2017-05-20 PROCEDURE — 85730 THROMBOPLASTIN TIME PARTIAL: CPT

## 2017-05-20 PROCEDURE — 95819 EEG AWAKE AND ASLEEP: CPT | Mod: 26,,, | Performed by: PSYCHIATRY & NEUROLOGY

## 2017-05-20 PROCEDURE — 99233 SBSQ HOSP IP/OBS HIGH 50: CPT | Mod: ,,, | Performed by: PSYCHIATRY & NEUROLOGY

## 2017-05-20 PROCEDURE — 99900035 HC TECH TIME PER 15 MIN (STAT)

## 2017-05-20 PROCEDURE — 25000003 PHARM REV CODE 250: Performed by: INTERNAL MEDICINE

## 2017-05-20 PROCEDURE — 25000003 PHARM REV CODE 250: Performed by: PSYCHIATRY & NEUROLOGY

## 2017-05-20 PROCEDURE — 27000221 HC OXYGEN, UP TO 24 HOURS

## 2017-05-20 PROCEDURE — 99291 CRITICAL CARE FIRST HOUR: CPT | Mod: ,,, | Performed by: INTERNAL MEDICINE

## 2017-05-20 PROCEDURE — 80053 COMPREHEN METABOLIC PANEL: CPT

## 2017-05-20 PROCEDURE — 25000003 PHARM REV CODE 250: Performed by: HOSPITALIST

## 2017-05-20 PROCEDURE — 95816 EEG AWAKE AND DROWSY: CPT

## 2017-05-20 PROCEDURE — 80074 ACUTE HEPATITIS PANEL: CPT

## 2017-05-20 RX ORDER — ASPIRIN 81 MG/1
81 TABLET ORAL DAILY
Status: DISCONTINUED | OUTPATIENT
Start: 2017-05-20 | End: 2017-05-27 | Stop reason: HOSPADM

## 2017-05-20 RX ORDER — METOPROLOL TARTRATE 25 MG/1
25 TABLET, FILM COATED ORAL 2 TIMES DAILY
Status: DISCONTINUED | OUTPATIENT
Start: 2017-05-21 | End: 2017-05-21

## 2017-05-20 RX ORDER — METOPROLOL TARTRATE 25 MG/1
25 TABLET, FILM COATED ORAL 2 TIMES DAILY
Status: DISCONTINUED | OUTPATIENT
Start: 2017-05-20 | End: 2017-05-20

## 2017-05-20 RX ORDER — QUETIAPINE FUMARATE 25 MG/1
25 TABLET, FILM COATED ORAL NIGHTLY
Status: DISCONTINUED | OUTPATIENT
Start: 2017-05-20 | End: 2017-05-20

## 2017-05-20 RX ORDER — DEXMEDETOMIDINE HYDROCHLORIDE 4 UG/ML
0.5 INJECTION, SOLUTION INTRAVENOUS CONTINUOUS
Status: DISCONTINUED | OUTPATIENT
Start: 2017-05-20 | End: 2017-05-21

## 2017-05-20 RX ORDER — QUETIAPINE FUMARATE 25 MG/1
25 TABLET, FILM COATED ORAL ONCE
Status: COMPLETED | OUTPATIENT
Start: 2017-05-20 | End: 2017-05-20

## 2017-05-20 RX ORDER — HEPARIN SODIUM,PORCINE/D5W 25000/250
16 INTRAVENOUS SOLUTION INTRAVENOUS CONTINUOUS
Status: DISCONTINUED | OUTPATIENT
Start: 2017-05-20 | End: 2017-05-23

## 2017-05-20 RX ORDER — QUETIAPINE FUMARATE 25 MG/1
25 TABLET, FILM COATED ORAL NIGHTLY
Status: DISCONTINUED | OUTPATIENT
Start: 2017-05-21 | End: 2017-05-21

## 2017-05-20 RX ADMIN — METOPROLOL TARTRATE 25 MG: 25 TABLET ORAL at 12:05

## 2017-05-20 RX ADMIN — QUETIAPINE FUMARATE 25 MG: 25 TABLET, FILM COATED ORAL at 06:05

## 2017-05-20 RX ADMIN — BUDESONIDE 0.5 MG: 0.5 SUSPENSION RESPIRATORY (INHALATION) at 07:05

## 2017-05-20 RX ADMIN — CHLORDIAZEPOXIDE HYDROCHLORIDE 50 MG: 25 CAPSULE ORAL at 05:05

## 2017-05-20 RX ADMIN — IPRATROPIUM BROMIDE AND ALBUTEROL SULFATE 3 ML: .5; 3 SOLUTION RESPIRATORY (INHALATION) at 01:05

## 2017-05-20 RX ADMIN — PANTOPRAZOLE SODIUM 40 MG: 40 INJECTION, POWDER, FOR SOLUTION INTRAVENOUS at 09:05

## 2017-05-20 RX ADMIN — IPRATROPIUM BROMIDE AND ALBUTEROL SULFATE 3 ML: .5; 3 SOLUTION RESPIRATORY (INHALATION) at 07:05

## 2017-05-20 RX ADMIN — CHLORDIAZEPOXIDE HYDROCHLORIDE 50 MG: 25 CAPSULE ORAL at 01:05

## 2017-05-20 RX ADMIN — HEPARIN SODIUM AND DEXTROSE 16 UNITS/KG/HR: 10000; 5 INJECTION INTRAVENOUS at 09:05

## 2017-05-20 RX ADMIN — CHLORDIAZEPOXIDE HYDROCHLORIDE 50 MG: 25 CAPSULE ORAL at 12:05

## 2017-05-20 RX ADMIN — LACTULOSE 15 G: 10 SOLUTION ORAL at 09:05

## 2017-05-20 RX ADMIN — LACTULOSE 15 G: 10 SOLUTION ORAL at 01:05

## 2017-05-20 RX ADMIN — LACTULOSE 15 G: 10 SOLUTION ORAL at 05:05

## 2017-05-20 RX ADMIN — ATORVASTATIN CALCIUM 20 MG: 10 TABLET, FILM COATED ORAL at 09:05

## 2017-05-20 RX ADMIN — ASPIRIN 81 MG: 81 TABLET, COATED ORAL at 09:05

## 2017-05-20 RX ADMIN — ARFORMOTEROL TARTRATE 15 MCG: 15 SOLUTION RESPIRATORY (INHALATION) at 08:05

## 2017-05-20 RX ADMIN — ARFORMOTEROL TARTRATE 15 MCG: 15 SOLUTION RESPIRATORY (INHALATION) at 07:05

## 2017-05-20 RX ADMIN — DEXMEDETOMIDINE HYDROCHLORIDE 0.3 MCG/KG/HR: 4 INJECTION, SOLUTION INTRAVENOUS at 07:05

## 2017-05-20 NOTE — PROGRESS NOTES
1715 through 1900: Patient agitated and restless, having incontinent episodes of stool.  Patient cleaned, pericare and catheter care provided each time.  Placed patient back on BiPAP.  Dr. Plata notified multiple times throughout time period of patient's continued agitation despite interventions, see eMAR for documentation.  MD gave verbal orders, but did not come to bedside.  Patient flailing legs and body in the bed, HR 140s, BP elevated.  Clarified Haldol order with Dr. Plata before administering d/t patient's prolonged QTI.  Okay to give to patient.    1905:  Dr. Boo with [facility] notified and came to patient's bedside.  New orders received and noted.  Patient resting, HR 90s.    It is to note that patient had same period of agitation yesterday starting around the same time between [id number], continuing through shift change at 1900.

## 2017-05-20 NOTE — PLAN OF CARE
Problem: Patient Care Overview  Goal: Plan of Care Review  Outcome: Ongoing (interventions implemented as appropriate)  Precedex weaned off early this AM.  Patient off BiPAP around 11AM.  Remains on 2LNC, tolerating well.  Remained lethargic throughout the day, but slowly LOC improving.  Remains in restraints.  BP dropped post beta-blocker administration,  Dr. Suarez notified.  New orders received and noted.  UOP remains minimal.   at the bedside and updated on POC.   denies patient has any history of Bipolar disorder.  Patient only has history of severe depression.  Bolus tube feeds started today. Rested well throughout the shift.  Will continue to monitor patient.

## 2017-05-20 NOTE — PLAN OF CARE
Problem: Patient Care Overview  Goal: Plan of Care Review  Outcome: Ongoing (interventions implemented as appropriate)  Patient is currently on BiPAP at this time. Mepilex in place, no redness or breakdown noted at this time. Patient tolerated treatment well.

## 2017-05-20 NOTE — PROGRESS NOTES
PTT drawn at 1430, contaminated per .  Recheck is baseline with patient's PTT prior to start of Heparin.  Will cotinue Heparin gtt at previous rate of 16 units/kg/min and recheck PTT per nomogram.

## 2017-05-20 NOTE — ASSESSMENT & PLAN NOTE
Holding all home psychiatric medications.  Careful use of Benzodiazepines with severe COPD.  PRN Ativan for agitation.  Wean Precedex as able.

## 2017-05-20 NOTE — PROGRESS NOTES
Progress note  Neurology      Neurology follow up:  For:Mental status changes    SUBJECTIVE:      HPI:   Last night she was agitated and needed extra sedative to calm her down. Morning nurse reported that for last 3 nights, she showed agitations , confusion and needed sedatives to calm her down. She is not on  bipap. Alert but mildly drowsy. Denied any pain or discomfort. Understanding is clear. EEG was done.         Past Medical History:   Diagnosis Date    Anxiety     Bipolar disorder     COPD (chronic obstructive pulmonary disease)     Depression     Respiratory distress     SOB (shortness of breath)      Past Surgical History:   Procedure Laterality Date    COLONOSCOPY      TUBAL LIGATION       Family History   Problem Relation Age of Onset    COPD Mother     Cancer Father      brain    Aneurysm Brother     Suicide Son     No Known Problems Son     No Known Problems Son      Social History   Substance Use Topics    Smoking status: Current Every Day Smoker     Packs/day: 0.50     Years: 35.00     Types: Cigarettes    Smokeless tobacco: None      Comment: decreasing amount of cigarettes    Alcohol use Yes      Comment: occassionally       Review of patient's allergies indicates:   Allergen Reactions    Codeine Itching    Codeine phosphate      Itchy (skin)^      Patient Active Problem List   Diagnosis    Closed fracture of proximal end of left humerus    Fracture of proximal humerus    Pneumonia    Depression    Anxiety    Bipolar disorder    COPD, severe    Tobacco abuse    Abdominal pain    Lung infiltrate    COPD exacerbation    Acute on chronic respiratory failure with hypoxia    Pneumonia involving right lung    Delirium due to another medical condition, acute, hyperactive    LFT elevation    Systolic heart failure with probable CAD         Scheduled Meds:   albuterol-ipratropium 2.5mg-0.5mg/3mL  3 mL Nebulization Q6H    arformoterol  15 mcg Nebulization Q12H    aspirin   81 mg Oral Daily    budesonide  0.5 mg Nebulization Q12H    chlordiazepoxide  50 mg Oral QID    lactulose  15 g Oral TID    metoprolol tartrate  25 mg Oral BID    pantoprazole  40 mg Intravenous Daily    quetiapine  25 mg Oral QHS     Continuous Infusions:   sodium chloride 0.9% 50 mL/hr at 05/20/17 1200    dexmedetomidine (PRECEDEX) infusion Stopped (05/20/17 0815)    heparin (porcine) in D5W 16 Units/kg/hr (05/20/17 1200)     PRN Meds:acetaminophen, albuterol-ipratropium 2.5mg-0.5mg/3mL, aluminum-magnesium hydroxide-simethicone, haloperidol lactate, heparin (PORCINE), heparin (PORCINE), lorazepam, ondansetron      Review of Systems   Constitutional: Negative for fever and weight loss.   HENT: Negative for hearing loss.    Eyes: Negative for blurred vision, double vision, photophobia and pain.   Respiratory: Positive for shortness of breath.    Cardiovascular: Negative for chest pain.   Gastrointestinal: Negative for abdominal pain, nausea and vomiting.   Genitourinary: Negative for dysuria, frequency and urgency.   Musculoskeletal: Negative.  Negative for back pain, falls, joint pain, myalgias and neck pain.   Skin: Negative for itching and rash.   Neurological: Negative for tingling and headaches.        Drowsiness but alert. Arousable.   Psychiatric/Behavioral: Negative for depression and memory loss.           OBJECTIVE:     Vital Signs (Most Recent)  Temp: 98 °F (36.7 °C) (05/20/17 1105)  Pulse: 72 (05/20/17 1200)  Resp: 14 (05/20/17 1200)  BP: 123/78 (05/20/17 1200)  SpO2: 98 % (05/20/17 1200)    Vital Signs Range (Last 24H):  Temp:  [97.8 °F (36.6 °C)-98.7 °F (37.1 °C)]   Pulse:  []   Resp:  [14-49]   BP: ()/()   SpO2:  [78 %-100 %]       Physical Exam   Constitutional: She is oriented to person, place, and time. She appears well-developed and well-nourished.   HENT:   Head: Normocephalic and atraumatic.   Eyes: Conjunctivae and EOM are normal. Pupils are equal, round, and  reactive to light.   Neck: Normal range of motion. Neck supple. No JVD present. No tracheal deviation present. No thyromegaly present.   Cardiovascular: Normal rate, regular rhythm and normal heart sounds.    Pulmonary/Chest: Effort normal and breath sounds normal.   Abdominal: She exhibits no distension. There is no tenderness.   Musculoskeletal: Normal range of motion. She exhibits no edema or tenderness.   Neurological: She is alert and oriented to person, place, and time. She has normal strength and normal reflexes. She is not disoriented. She displays normal reflexes. No cranial nerve deficit or sensory deficit. She exhibits normal muscle tone. She displays a negative Romberg sign. She displays no seizure activity. Coordination normal. GCS eye subscore is 4. GCS verbal subscore is 5. GCS motor subscore is 6.   Reflex Scores:       Tricep reflexes are 2+ on the right side and 2+ on the left side.       Bicep reflexes are 2+ on the right side and 2+ on the left side.       Brachioradialis reflexes are 2+ on the right side and 2+ on the left side.       Patellar reflexes are 2+ on the right side and 2+ on the left side.       Achilles reflexes are 2+ on the right side and 2+ on the left side.  She talked and no  Trouble in speaking.Moved all her extremities symmetrically. She was drowsy and communication was limited. But there was no abnormal behavior.   Skin: Skin is warm and dry. No rash noted.     Her motor weakness is due to possibly due to lack of efforts. She is sleepy with sedation.    Laboratory:  Lab Results   Component Value Date    WBC 7.97 05/20/2017    HGB 11.2 (L) 05/20/2017    HCT 35.4 (L) 05/20/2017     05/20/2017     (H) 05/20/2017    AST 91 (H) 05/20/2017     (H) 05/20/2017    K 4.0 05/20/2017     (H) 05/20/2017    CREATININE 0.6 05/20/2017    BUN 22 (H) 05/20/2017    CO2 24 05/20/2017    INR 1.0 05/20/2017       EEG: diffuse slow but there is no seizure  activities.        ASSESSMENT/PLAN:     Assessment:   1. Metabolic encephalopathy , doubt cerebrovascular accident. She is improving . There is no focal or lateralized neurological deficits on examination.  2. Agitations at night: unexplained except sundowning effect.     Patient Active Problem List   Diagnosis    Closed fracture of proximal end of left humerus    Fracture of proximal humerus    Pneumonia    Depression    Anxiety    Bipolar disorder    COPD, severe    Tobacco abuse    Abdominal pain    Lung infiltrate    COPD exacerbation    Acute on chronic respiratory failure with hypoxia    Pneumonia involving right lung    Delirium due to another medical condition, acute, hyperactive    LFT elevation    Systolic heart failure with probable CAD         Plan:  Talked to nurse that please, give Seroquel 25 mg at night.  Will follow.

## 2017-05-20 NOTE — PROCEDURES
"Jeniffer Fernandez is a 57 y.o. female patient.    Temp: 98 °F (36.7 °C) (05/20/17 1105)  Pulse: 72 (05/20/17 1200)  Resp: 14 (05/20/17 1200)  BP: 123/78 (05/20/17 1200)  SpO2: 98 % (05/20/17 1200)  Weight: 47.8 kg (105 lb 6.1 oz) (05/20/17 0600)  Height: 5' 4" (162.6 cm) (05/14/17 0744)       Procedures     DATE OF PROCEDURE:  05/20/2017    EEG ordered by Dr. Grossman.    REASON FOR EEG:  To evaluate mental status change.    PROCEDURE:  EEG has been done according to International 10-20 system.  Bipolar   and referential montage has been used.  The patient was sedated before the EEG   has been done.    FINDINGS:  Background is little noisy and also background showed some artifacts.    This is a 20-minute EEG.  Background is slow, diffuse, but there is no   physiological alpha or beta activity.  No seizure or seizure-like activity has   been noticed.  No epileptiform discharge or focal attenuation has been   appreciated.    FINAL CONCLUSION:  This EEG should be regarded as abnormal with mild-to-moderate   bihemispheric dysfunctions.  No seizure activity has been noticed.      Mary Grossman  5/20/2017    "

## 2017-05-20 NOTE — ASSESSMENT & PLAN NOTE
LFT's were normal on admission.  Check daily LFTs.  Lactulose given yesterday and patient's ammonia level decreased but is up to 55 today.  Liver Ultrasound and hepatitis panel.

## 2017-05-20 NOTE — PROGRESS NOTES
[facility] -   Critical Care Medicine  Progress Note    Patient Name: Jeniffer Fernandez  MRN: [id number]  Admission Date: 5/14/2017  Hospital Length of Stay: 6 days  Code Status: Full Code  Attending Provider: Lewis Suarez MD  Primary Care Provider: Ben Pelaez Jr, MD   Principal Problem: Acute on chronic respiratory failure with hypoxia    Subjective:     HPI: 57 y.o. female presents with exacerbation of chronic obstructive pulmonary disease and pneumonia - became confused disorientated. Called to assess. Onset of symptoms was abrupt starting earlier today with rapidly worsening course since that time. Patient unable to give history . Info obtained from  - he denies significant alcohol use. Symptoms are aggravated by stimulation . Symptoms improve with rest and haldol. No prior history of alcohol withdrawal  Hx of bipolar disorder     Hospital/ICU Course: transferred to ICU for respiratory failure and delirium. CT of head - negative. Neurology consulted. Responding to benzodiazepines, suspect alcohol withdrawal in light of abnormal liver function .      Interval History/Significant Events: Pattern of confusion and agitation in the evening ? Sundowning. Add B Blocker for suspected heart disease. Coronary Artery Disease and depressed ejection fraction      Review of Systems  Objective:     Vital Signs (Most Recent):  Temp: 98 °F (36.7 °C) (05/20/17 1105)  Pulse: 82 (05/20/17 1305)  Resp: 16 (05/20/17 1305)  BP: (!) 127/90 (05/20/17 1305)  SpO2: 95 % (05/20/17 1305) Vital Signs (24h Range):  Temp:  [97.8 °F (36.6 °C)-98.7 °F (37.1 °C)] 98 °F (36.7 °C)  Pulse:  [] 82  Resp:  [14-49] 16  SpO2:  [78 %-100 %] 95 %  BP: ()/() 127/90     Weight: 47.8 kg (105 lb 6.1 oz)  Body mass index is 18.09 kg/(m^2).      Intake/Output Summary (Last 24 hours) at 05/20/17 1333  Last data filed at 05/20/17 1300   Gross per 24 hour   Intake          2055.23 ml   Output              585 ml   Net           1470.23 ml       Physical Exam    Vents:  Oxygen Concentration (%): 30 (05/20/17 1109)    Lines/Drains/Airways     Drain                 NG/OG Tube 05/18/17 1052 [place] sump 16 Fr. Left nostril 2 days         Urethral Catheter 05/18/17 1415 Double-lumen 1 day          Peripheral Intravenous Line                 Peripheral IV - Single Lumen 05/18/17 2142 Left Hand 1 day         Peripheral IV - Single Lumen 05/20/17 1009 Right Wrist less than 1 day                Significant Labs:    CBC/Anemia Profile:    Recent Labs  Lab 05/19/17  0807 05/20/17  0401   WBC 8.41 7.97   HGB 11.6* 11.2*   HCT 36.7* 35.4*    210   * 102*   RDW 14.9* 14.8*        Chemistries:    Recent Labs  Lab 05/19/17 0409 05/19/17 0807 05/20/17  0400   NA  --  146* 146*   K  --  3.8 4.0   CL  --  108 111*   CO2  --  29 24   BUN  --  24* 22*   CREATININE  --  0.7 0.6   CALCIUM  --  8.0* 7.9*   ALBUMIN 3.2*  --  2.8*   PROT 5.8*  --  5.1*   BILITOT 0.3  --  0.3   ALKPHOS 104  --  105   *  --  178*   *  --  91*   MG 2.5  --   --        ABGs:   Recent Labs  Lab 05/19/17  1923   PH 7.281*   PCO2 53.6*   HCO3 25.3   POCSATURATED 95   BE -1     BMP:   Recent Labs  Lab 05/19/17 0409 05/20/17  0400   GLU  --   < > 83   NA  --   < > 146*   K  --   < > 4.0   CL  --   < > 111*   CO2  --   < > 24   BUN  --   < > 22*   CREATININE  --   < > 0.6   CALCIUM  --   < > 7.9*   MG 2.5  --   --    < > = values in this interval not displayed.  CMP:   Recent Labs  Lab 05/19/17 0409 05/19/17  0807 05/20/17  0400   NA  --  146* 146*   K  --  3.8 4.0   CL  --  108 111*   CO2  --  29 24   GLU  --  107 83   BUN  --  24* 22*   CREATININE  --  0.7 0.6   CALCIUM  --  8.0* 7.9*   PROT 5.8*  --  5.1*   ALBUMIN 3.2*  --  2.8*   BILITOT 0.3  --  0.3   ALKPHOS 104  --  105   *  --  91*   *  --  178*   ANIONGAP  --  9 11   EGFRNONAA  --  >60 >60     Cardiac Markers: No results for input(s): CKMB, TROPONINT, MYOGLOBIN in the last 48  hours.  Respiratory Culture: No results for input(s): GSRESP, RESPIRATORYC in the last 48 hours.  All pertinent labs within the past 24 hours have been reviewed.    Significant Imaging:  CXR: I have reviewed all pertinent results/findings within the past 24 hours and my personal findings are:  COPD    Assessment/Plan:     Active Diagnoses:    Diagnosis Date Noted POA    PRINCIPAL PROBLEM:  Acute on chronic respiratory failure with hypoxia [J96.21] 05/14/2017 Yes    Delirium due to another medical condition, acute, hyperactive [F05] 05/17/2017 No    Pneumonia involving right lung [J18.9] 05/14/2017 Yes    Systolic heart failure with probable CAD [I50.20] 05/20/2017 Yes    LFT elevation [R94.5] 05/18/2017 Yes    COPD exacerbation [J44.1] 05/14/2017 Yes    Bipolar disorder [F31.9] 12/22/2016 Yes    Anxiety [F41.9] 12/22/2016 Yes    Tobacco abuse [Z72.0] 12/22/2016 Yes      Problems Resolved During this Admission:    Diagnosis Date Noted Date Resolved POA        Critical Care Medicine Daily Checklist:    A: Awake: RASS Goal/Actual Goal: RASS Goal: 0-->alert and calm  Actual: [place] Agitation Sedation Scale (RASS): Very agitated   B: Spontaneous Breathing Trial Performed?     C: SAT & SBT Coordinated?  na                      D: Delirium: CAM-ICU Overall CAM-ICU: Positive   E: Early Mobility Performed? No   F: Feeding Goal: Goals: Oral diet or nutrition support within 3 days  Status: Nutrition Goal Status: new   Current Diet Order   Procedures    Diet NPO Except for: Medication, Sips with Medication, Ice Chips     Order Specific Question:   Except for     Answer:   Medication     Order Specific Question:   Except for     Answer:   Sips with Medication     Order Specific Question:   Except for     Answer:   Ice Chips      AS: Analgesia/Sedation adequate   T: Thromboembolic Prophylaxis Y   H: HOB > 300 Yes   U: Stress Ulcer Prophylaxis (if needed) Y   G: Glucose Control adequate   B: Bowel Function     I:  Indwelling Catheter (Lines & Rodriguez) Necessity needed   D: De-escalation of Antimicrobials/Pharmacotherapies down    Plan for the day/ETD Try to wean off precedex if possible. Start tube feedeing    Code Status:  Family/Goals of Care: Full Code  discussed     Critical Care Time: 45 min  Critical secondary to Patient has a condition that poses threat to life and bodily function: Severe Respiratory Distress  Patient has an abrupt change in neurologic status: Altered Mental Status from Delerium Tremens       Critical care was time spent personally by me on the following activities: development of treatment plan with patient or surrogate and bedside caregivers, discussions with consultants, evaluation of patient's response to treatment, examination of patient, ordering and performing treatments and interventions, ordering and review of laboratory studies, ordering and review of radiographic studies, pulse oximetry, re-evaluation of patient's condition.  This critical care time did not overlap with that of any other provider or involve time for any procedures.     Ilan Plata MD  Critical Care Medicine  [facility] -

## 2017-05-20 NOTE — PROGRESS NOTES
[facility] Hospital Medicine  Progress Note    Patient Name: Jeniffer Fernandez  MRN: [id number]  Patient Class: IP- Inpatient   Admission Date: 5/14/2017  Length of Stay: 6 days  Attending Physician: Lewis Suarez MD  Primary Care Provider: Ben Pelaez Jr, MD        Subjective:     Principal Problem:Acute on chronic respiratory failure with hypoxia    HPI:  Jeniffer Fernandez is a 58 yo female + smoker with COPD and chronic respiratory failure who presented with worsening SOB over 1 week. For 5 days she had worn oxygen continuously. She describes frequent productive cough, wheezing, SOB, generalized weakness and FRANCES. Prior to presentation she had subjective fever and sweats. Pt has had several neb treatments and still having SOB. In the ED, ABG reflects acute on chronic hypoxic respiratory failure and O2 sat 87 %. CTA of Chest indicated a patchy RUL/RLL interstitial infiltrate.     Hospital Course:  Very agitated late morning 16 May and received Haldol and Ativan and slept most of the day.  She was better the following morning but became more confused and agitated in the evening and combative with staff.  Increased wheezing and pulling her oxygen off with desaturations.  When stable her blood gas showed adequate oxygenation and a pCO2 only slightly high at 50.  Transferred to ICU for respiratory failure. Was started on Precedex that has now been discontinued.    5/18 - Continued to have intermittent periods of agitation. Negative head CT and Neurology consult placed.    5/19 - Evening agitation.    5/20 - Started heparin drip and aspirin per Cardiology recommendations.  Echo shows HF with reduced ejection fraction of 30% and slightly elevated troponin.      Interval History:  Recurrent pattern of agitation/panic in the evening but often does well during the day.  No significant problems overnight.  Continues to oxygenate well with complaints of shortness of breath.    Review of Systems   Unable to  perform ROS: Mental status change   Constitutional: Negative for chills and fever.   HENT: Negative for congestion and sore throat.    Eyes: Negative for visual disturbance.   Respiratory: Positive for shortness of breath.    Cardiovascular: Negative for chest pain, palpitations and leg swelling.   Gastrointestinal: Negative for abdominal pain, blood in stool, constipation, diarrhea, nausea and vomiting.   Genitourinary: Negative for dysuria and hematuria.   Musculoskeletal: Negative for arthralgias and back pain.   Skin: Negative for rash and wound.   Neurological: Negative for dizziness, weakness, light-headedness and numbness.   Hematological: Negative for adenopathy.     Objective:     Vital Signs (Most Recent):  Temp: 97.8 °F (36.6 °C) (05/20/17 0705)  Pulse: 70 (05/20/17 0803)  Resp: 20 (05/20/17 0803)  BP: 108/71 (05/20/17 0800)  SpO2: 100 % (05/20/17 0803) Vital Signs (24h Range):  Temp:  [97.6 °F (36.4 °C)-98.7 °F (37.1 °C)] 97.8 °F (36.6 °C)  Pulse:  [] 70  Resp:  [14-49] 20  SpO2:  [78 %-100 %] 100 %  BP: ()/() 108/71     Weight: 47.8 kg (105 lb 6.1 oz)  Body mass index is 18.09 kg/(m^2).    Intake/Output Summary (Last 24 hours) at 05/20/17 0845  Last data filed at 05/20/17 0815   Gross per 24 hour   Intake          2354.56 ml   Output              595 ml   Net          1759.56 ml      Physical Exam   Constitutional: She appears well-developed and well-nourished. No distress.   HENT:   Head: Normocephalic and atraumatic.   Mouth/Throat: Oropharynx is clear and moist.   Eyes: Conjunctivae and EOM are normal. Pupils are equal, round, and reactive to light.   Neck: Neck supple. No JVD present. No thyromegaly present.   Cardiovascular: Normal rate and regular rhythm.  Exam reveals no gallop and no friction rub.    No murmur heard.  Pulmonary/Chest: She has wheezes. She has no rales.   Coarse crackles bilaterally.  Expiratory wheezes and generally reduced breath sounds.   Abdominal: Soft.  Bowel sounds are normal. She exhibits no distension. There is no tenderness. There is no rebound and no guarding.   Musculoskeletal: Normal range of motion. She exhibits no edema or deformity.   Lymphadenopathy:     She has no cervical adenopathy.   Neurological: She has normal reflexes.   Somnolent   Skin: Skin is warm and dry. No rash noted.   Nursing note and vitals reviewed.      Significant Labs:   CBC:     Recent Labs  Lab 05/19/17  0807 05/20/17  0401   WBC 8.41 7.97   HGB 11.6* 11.2*   HCT 36.7* 35.4*    210     CMP:     Recent Labs  Lab 05/19/17  0409 05/19/17  0807 05/20/17  0400   NA  --  146* 146*   K  --  3.8 4.0   CL  --  108 111*   CO2  --  29 24   GLU  --  107 83   BUN  --  24* 22*   CREATININE  --  0.7 0.6   CALCIUM  --  8.0* 7.9*   PROT 5.8*  --  5.1*   ALBUMIN 3.2*  --  2.8*   BILITOT 0.3  --  0.3   ALKPHOS 104  --  105   *  --  91*   *  --  178*   ANIONGAP  --  9 11   EGFRNONAA  --  >60 >60       Significant Imaging: I have reviewed all pertinent imaging results/findings within the past 24 hours.    Assessment/Plan:      * Acute on chronic respiratory failure with hypoxia  Multifactorial from COPD, heart failure, and pneumonia.  Supplemental oxygen to maintain sat > 92 % - wean as appropriate.  She has severe underlying COPD with most recent PFT in 2014 and has continued to smoke since then.  Pulmonary following.    Anxiety  Holding all home psychiatric medications.  Careful use of Benzodiazepines with severe COPD.  PRN Ativan for agitation.  Wean Precedex as able.    Pneumonia involving right lung  Supplemental oxygen.  Severe underlying chronic lung disease.  Respiratory culture with normal markus plus H.influenzae that is beta lactamase negative.    Completed course of Ceftriaxone.  Afebrile with non-productive cough.    Delirium due to another medical condition, acute, hyperactive  Her episodes of confusion and agitation are unclear.  She was not hypoxic and only slightly  hypercapneic.  She has a history of depression and misuse of medication and alcohol.  Her  has been consistent, helpful, and seems reliable in thinking she has not been drinking heavily or consistently in the recent weeks. CT head is negative for an acute intracranial process.  Neurology has been consulted.  Ammonia level has normalized.    LFT elevation  LFT's were normal on admission.  Check daily LFTs.  Lactulose given yesterday and patient's ammonia level decreased but is up to 55 today.  Liver Ultrasound and hepatitis panel.    Systolic heart failure with probable CAD  Most likely present when she was admitted given her history and risk factors.  Echo showed ejection fraction of 30 %.  Cardiology on consult with recommendations.  Optimize medical management.  Suspect ischemic heart disease and anticipate LHC early next week.    VTE Risk Mitigation         Ordered     Medium Risk of VTE  Once      05/14/17 1132     Place sequential compression device  Until discontinued      05/14/17 1132          Lewis Suarez MD  Department of Hospital Medicine   [facility]    Critical care time:  45 minutes

## 2017-05-20 NOTE — PLAN OF CARE
Problem: Patient Care Overview  Goal: Plan of Care Review  Outcome: Ongoing (interventions implemented as appropriate)  Plan of care review with patient, patient on precedex drip, rested overnight, vital signs stable, afebrile, sinus tach on monitor , Chest X-ray done due to patient agitation and restlessness, result show some pulmonary edema, Dr. Bragg from eICU notified. Not order due to low BP. Patient still having Low urine output.

## 2017-05-20 NOTE — ASSESSMENT & PLAN NOTE
Her episodes of confusion and agitation are unclear.  She was not hypoxic and only slightly hypercapneic.  She has a history of depression and misuse of medication and alcohol.  Her  has been consistent, helpful, and seems reliable in thinking she has not been drinking heavily or consistently in the recent weeks. CT head is negative for an acute intracranial process.  Neurology has been consulted.  Ammonia level has normalized.

## 2017-05-20 NOTE — PROGRESS NOTES
"Patient drowsy, but starting to repeat, "Help Me, Help Me."  Restless and beginning to move around in bed.  Overall situation is not as prominent agitation as noted previous nights, but patient's LOC is definitely increased and a change from throughout the entire shift. Will give Seroquel early as instructed by Dr. Grossman.  Will continue to monitor patient.  "

## 2017-05-20 NOTE — PROGRESS NOTES
Cardiology Progress Note        SUBJECTIVE:     History of Present Illness:  Patient is a 57 y.o. female presents with delirium. EKG with st inversion precordial leads, echo- EF=30%,   Very mild increase troponin      OBJECTIVE:     Vital Signs (Most Recent)  Temp: 98.4 °F (36.9 °C) (05/20/17 0315)  Pulse: (!) 58 (05/20/17 0645)  Resp: 14 (05/20/17 0645)  BP: 107/78 (05/20/17 0645)  SpO2: 98 % (05/20/17 0645)    Vital Signs Range (Last 24H):  Temp:  [97.6 °F (36.4 °C)-98.7 °F (37.1 °C)]   Pulse:  []   Resp:  [14-49]   BP: ()/()   SpO2:  [78 %-100 %]     Intake/Output last 3 shifts:  I/O last 3 completed shifts:  In: 3784.6 [I.V.:2974.6; NG/GT:260; IV Piggyback:550]  Out: 965 [Urine:965]    Intake/Output this shift:       Review of patient's allergies indicates:   Allergen Reactions    Codeine Itching    Codeine phosphate      Itchy (skin)^       Current Facility-Administered Medications   Medication    0.9%  NaCl infusion    acetaminophen tablet 650 mg    albuterol-ipratropium 2.5mg-0.5mg/3mL nebulizer solution 3 mL    albuterol-ipratropium 2.5mg-0.5mg/3mL nebulizer solution 3 mL    aluminum-magnesium hydroxide-simethicone 200-200-20 mg/5 mL suspension 30 mL    arformoterol nebulizer solution 15 mcg    atorvastatin tablet 20 mg    budesonide nebulizer solution 0.5 mg    chlordiazepoxide capsule 50 mg    dexmedetomidine (PRECEDEX) 400mcg/100mL 0.9% NaCL infusion    enoxaparin injection 40 mg    haloperidol lactate injection 5 mg    lactulose 20 gram/30 mL solution Soln 15 g    lorazepam injection 2 mg    ondansetron injection 4 mg    pantoprazole injection 40 mg     No current facility-administered medications on file prior to encounter.      Current Outpatient Prescriptions on File Prior to Encounter   Medication Sig    amitriptyline (ELAVIL) 50 MG tablet Take 1 tablet (50 mg total) by mouth every evening. (Patient taking differently: Take 25 mg by mouth 3 (three) times daily. )     lorazepam (ATIVAN) 0.5 MG tablet Take 0.5 mg by mouth every 6 (six) hours as needed for Anxiety.    montelukast (SINGULAIR) 10 mg tablet Take 10 mg by mouth every evening.    paroxetine (PAXIL) 20 MG tablet Take 40 mg by mouth every morning.     predniSONE (DELTASONE) 10 MG tablet Please take 20mg twice daily x 3 days, then take 10 mg twice daily x 2 days, then take 10 mg once daily x 2 days    ropinirole (REQUIP) 1 MG tablet Take 1 mg by mouth every evening.    b complex vitamins tablet Take 1 tablet by mouth once daily.    docusate sodium (COLACE) 100 MG capsule Take 1 capsule (100 mg total) by mouth 2 (two) times daily.    fluticasone (FLONASE) 50 mcg/actuation nasal spray 2 sprays by Each Nare route once daily.    mometasone-formoterol (DULERA) 200-5 mcg/actuation inhaler Inhale 2 puffs into the lungs 2 (two) times daily.    nicotine (NICODERM CQ) 21 mg/24 hr Place 1 patch onto the skin once daily.       Physical Exam:  General appearance: alert, appears stated age and cooperative  Head: Normocephalic, without obvious abnormality, atraumatic  Eyes:  conjunctivae/corneas clear. PERRL, EOM's intact. Fundi benign.  Nose: no discharge  Throat: normal findings: tongue midline and normal  Neck: no adenopathy, no carotid bruit, no JVD, supple, symmetrical, trachea midline and thyroid not enlarged, symmetric, no tenderness/mass/nodules  Back:  no skin lesions, erythema, or scars  Lungs:  clear to auscultation bilaterally  Chest wall: no tenderness  Heart: regular rate and rhythm, S1, S2 normal, no murmur, click, rub or gallop  Abdomen: soft, non-tender; bowel sounds normal; no masses,  no organomegaly  Extremities: extremities normal, atraumatic, no cyanosis or edema  Pulses: 2+ and symmetric  Skin: Skin color, texture, turgor normal. No rashes or lesions  Neurologic: Grossly normal    Laboratory:  Chemistry:   Lab Results   Component Value Date     (H) 05/20/2017    K 4.0 05/20/2017     (H)  05/20/2017    CO2 24 05/20/2017    BUN 22 (H) 05/20/2017    CREATININE 0.6 05/20/2017    CALCIUM 7.9 (L) 05/20/2017     Cardiac Markers:   Lab Results   Component Value Date    TROPONINI 0.100 (H) 05/20/2017     Cardiac Markers (Last 3):   Lab Results   Component Value Date    TROPONINI 0.100 (H) 05/20/2017    TROPONINI 0.107 (H) 05/19/2017    TROPONINI 0.128 (H) 05/19/2017     CBC:   Lab Results   Component Value Date    WBC 7.97 05/20/2017    HGB 11.2 (L) 05/20/2017    HCT 35.4 (L) 05/20/2017     (H) 05/20/2017     05/20/2017     Lipids: No results found for: CHOL, TRIG, HDL, LDLDIRECT  Coagulation:   Lab Results   Component Value Date    INR 1.0 05/14/2017       Diagnostic Results:  ECG: Reviewed  X-Ray: Reviewed  US: Reviewed  CT: Reviewed  Echo: Reviewed      ASSESSMENT/PLAN:     Patient Active Problem List   Diagnosis    Closed fracture of proximal end of left humerus    Fracture of proximal humerus    Pneumonia    Depression    Anxiety    Bipolar disorder    COPD, severe    Tobacco abuse    Abdominal pain    Lung infiltrate    COPD exacerbation    Acute on chronic respiratory failure with hypoxia    Pneumonia involving right lung    Delirium due to another medical condition, acute, hyperactive    LFT elevation        Plan: asa, heparin if no contraindication           Consider cath next week if neuro and pulmonary status improve

## 2017-05-20 NOTE — ASSESSMENT & PLAN NOTE
Most likely present when she was admitted given her history and risk factors.  Echo showed ejection fraction of 30 %.  Cardiology on consult with recommendations.  Optimize medical management.  Suspect ischemic heart disease and anticipate LHC early next week.

## 2017-05-20 NOTE — SUBJECTIVE & OBJECTIVE
Interval History:  Recurrent pattern of agitation/panic in the evening but often does well during the day.  No significant problems overnight.  Continues to oxygenate well with complaints of shortness of breath.    Review of Systems   Unable to perform ROS: Mental status change   Constitutional: Negative for chills and fever.   HENT: Negative for congestion and sore throat.    Eyes: Negative for visual disturbance.   Respiratory: Positive for shortness of breath.    Cardiovascular: Negative for chest pain, palpitations and leg swelling.   Gastrointestinal: Negative for abdominal pain, blood in stool, constipation, diarrhea, nausea and vomiting.   Genitourinary: Negative for dysuria and hematuria.   Musculoskeletal: Negative for arthralgias and back pain.   Skin: Negative for rash and wound.   Neurological: Negative for dizziness, weakness, light-headedness and numbness.   Hematological: Negative for adenopathy.     Objective:     Vital Signs (Most Recent):  Temp: 97.8 °F (36.6 °C) (05/20/17 0705)  Pulse: 70 (05/20/17 0803)  Resp: 20 (05/20/17 0803)  BP: 108/71 (05/20/17 0800)  SpO2: 100 % (05/20/17 0803) Vital Signs (24h Range):  Temp:  [97.6 °F (36.4 °C)-98.7 °F (37.1 °C)] 97.8 °F (36.6 °C)  Pulse:  [] 70  Resp:  [14-49] 20  SpO2:  [78 %-100 %] 100 %  BP: ()/() 108/71     Weight: 47.8 kg (105 lb 6.1 oz)  Body mass index is 18.09 kg/(m^2).    Intake/Output Summary (Last 24 hours) at 05/20/17 0845  Last data filed at 05/20/17 0815   Gross per 24 hour   Intake          2354.56 ml   Output              595 ml   Net          1759.56 ml      Physical Exam   Constitutional: She appears well-developed and well-nourished. No distress.   HENT:   Head: Normocephalic and atraumatic.   Mouth/Throat: Oropharynx is clear and moist.   Eyes: Conjunctivae and EOM are normal. Pupils are equal, round, and reactive to light.   Neck: Neck supple. No JVD present. No thyromegaly present.   Cardiovascular: Normal rate and  regular rhythm.  Exam reveals no gallop and no friction rub.    No murmur heard.  Pulmonary/Chest: She has wheezes. She has no rales.   Coarse crackles bilaterally.  Expiratory wheezes and generally reduced breath sounds.   Abdominal: Soft. Bowel sounds are normal. She exhibits no distension. There is no tenderness. There is no rebound and no guarding.   Musculoskeletal: Normal range of motion. She exhibits no edema or deformity.   Lymphadenopathy:     She has no cervical adenopathy.   Neurological: She has normal reflexes.   Somnolent   Skin: Skin is warm and dry. No rash noted.   Nursing note and vitals reviewed.      Significant Labs:   CBC:     Recent Labs  Lab 05/19/17  0807 05/20/17  0401   WBC 8.41 7.97   HGB 11.6* 11.2*   HCT 36.7* 35.4*    210     CMP:     Recent Labs  Lab 05/19/17  0409 05/19/17  0807 05/20/17  0400   NA  --  146* 146*   K  --  3.8 4.0   CL  --  108 111*   CO2  --  29 24   GLU  --  107 83   BUN  --  24* 22*   CREATININE  --  0.7 0.6   CALCIUM  --  8.0* 7.9*   PROT 5.8*  --  5.1*   ALBUMIN 3.2*  --  2.8*   BILITOT 0.3  --  0.3   ALKPHOS 104  --  105   *  --  91*   *  --  178*   ANIONGAP  --  9 11   EGFRNONAA  --  >60 >60       Significant Imaging: I have reviewed all pertinent imaging results/findings within the past 24 hours.

## 2017-05-20 NOTE — ASSESSMENT & PLAN NOTE
Multifactorial from COPD, heart failure, and pneumonia.  Supplemental oxygen to maintain sat > 92 % - wean as appropriate.  She has severe underlying COPD with most recent PFT in 2014 and has continued to smoke since then.  Pulmonary following.

## 2017-05-20 NOTE — ASSESSMENT & PLAN NOTE
Supplemental oxygen.  Severe underlying chronic lung disease.  Respiratory culture with normal markus plus H.influenzae that is beta lactamase negative.    Completed course of Ceftriaxone.  Afebrile with non-productive cough.

## 2017-05-21 PROBLEM — J18.9 PNEUMONIA INVOLVING RIGHT LUNG: Status: RESOLVED | Noted: 2017-05-14 | Resolved: 2017-05-21

## 2017-05-21 PROBLEM — R57.9 SHOCK CIRCULATORY: Status: ACTIVE | Noted: 2017-05-21

## 2017-05-21 LAB
ALBUMIN SERPL BCP-MCNC: 2.6 G/DL
ALP SERPL-CCNC: 91 U/L
ALT SERPL W/O P-5'-P-CCNC: 128 U/L
AMMONIA PLAS-SCNC: 47 UMOL/L
ANION GAP SERPL CALC-SCNC: 7 MMOL/L
APTT BLDCRRT: 28.9 SEC
APTT BLDCRRT: 44.3 SEC
AST SERPL-CCNC: 45 U/L
BASOPHILS # BLD AUTO: 0 K/UL
BASOPHILS NFR BLD: 0 %
BILIRUB SERPL-MCNC: 0.3 MG/DL
BUN SERPL-MCNC: 16 MG/DL
CALCIUM SERPL-MCNC: 7.9 MG/DL
CHLORIDE SERPL-SCNC: 109 MMOL/L
CO2 SERPL-SCNC: 28 MMOL/L
CREAT SERPL-MCNC: 0.6 MG/DL
DIFFERENTIAL METHOD: ABNORMAL
EOSINOPHIL # BLD AUTO: 0.1 K/UL
EOSINOPHIL NFR BLD: 1.2 %
ERYTHROCYTE [DISTWIDTH] IN BLOOD BY AUTOMATED COUNT: 14.9 %
EST. GFR  (AFRICAN AMERICAN): >60 ML/MIN/1.73 M^2
EST. GFR  (NON AFRICAN AMERICAN): >60 ML/MIN/1.73 M^2
GLUCOSE SERPL-MCNC: 128 MG/DL
HCT VFR BLD AUTO: 35.1 %
HGB BLD-MCNC: 11.3 G/DL
LYMPHOCYTES # BLD AUTO: 1.9 K/UL
LYMPHOCYTES NFR BLD: 22.6 %
MAGNESIUM SERPL-MCNC: 2 MG/DL
MCH RBC QN AUTO: 32.6 PG
MCHC RBC AUTO-ENTMCNC: 32.2 %
MCV RBC AUTO: 101 FL
MONOCYTES # BLD AUTO: 0.9 K/UL
MONOCYTES NFR BLD: 10.6 %
NEUTROPHILS # BLD AUTO: 5.5 K/UL
NEUTROPHILS NFR BLD: 65.6 %
PHOSPHATE SERPL-MCNC: 2.1 MG/DL
PLATELET # BLD AUTO: 214 K/UL
PMV BLD AUTO: 11.2 FL
POTASSIUM SERPL-SCNC: 3.4 MMOL/L
PROT SERPL-MCNC: 4.9 G/DL
RBC # BLD AUTO: 3.47 M/UL
SODIUM SERPL-SCNC: 144 MMOL/L
WBC # BLD AUTO: 8.33 K/UL

## 2017-05-21 PROCEDURE — 85025 COMPLETE CBC W/AUTO DIFF WBC: CPT

## 2017-05-21 PROCEDURE — 27000221 HC OXYGEN, UP TO 24 HOURS

## 2017-05-21 PROCEDURE — 25000003 PHARM REV CODE 250: Performed by: INTERNAL MEDICINE

## 2017-05-21 PROCEDURE — 63600175 PHARM REV CODE 636 W HCPCS: Performed by: HOSPITALIST

## 2017-05-21 PROCEDURE — 99231 SBSQ HOSP IP/OBS SF/LOW 25: CPT | Mod: ,,, | Performed by: PSYCHIATRY & NEUROLOGY

## 2017-05-21 PROCEDURE — 99900035 HC TECH TIME PER 15 MIN (STAT)

## 2017-05-21 PROCEDURE — 25000003 PHARM REV CODE 250: Performed by: HOSPITALIST

## 2017-05-21 PROCEDURE — 36415 COLL VENOUS BLD VENIPUNCTURE: CPT

## 2017-05-21 PROCEDURE — 82140 ASSAY OF AMMONIA: CPT

## 2017-05-21 PROCEDURE — 99232 SBSQ HOSP IP/OBS MODERATE 35: CPT | Mod: ,,, | Performed by: INTERNAL MEDICINE

## 2017-05-21 PROCEDURE — 99291 CRITICAL CARE FIRST HOUR: CPT | Mod: ,,, | Performed by: NURSE PRACTITIONER

## 2017-05-21 PROCEDURE — C9113 INJ PANTOPRAZOLE SODIUM, VIA: HCPCS | Performed by: INTERNAL MEDICINE

## 2017-05-21 PROCEDURE — 85730 THROMBOPLASTIN TIME PARTIAL: CPT

## 2017-05-21 PROCEDURE — 80053 COMPREHEN METABOLIC PANEL: CPT

## 2017-05-21 PROCEDURE — 85730 THROMBOPLASTIN TIME PARTIAL: CPT | Mod: 91

## 2017-05-21 PROCEDURE — 25000242 PHARM REV CODE 250 ALT 637 W/ HCPCS: Performed by: INTERNAL MEDICINE

## 2017-05-21 PROCEDURE — 84100 ASSAY OF PHOSPHORUS: CPT

## 2017-05-21 PROCEDURE — 25000003 PHARM REV CODE 250: Performed by: NURSE PRACTITIONER

## 2017-05-21 PROCEDURE — 63600175 PHARM REV CODE 636 W HCPCS: Performed by: INTERNAL MEDICINE

## 2017-05-21 PROCEDURE — 20000000 HC ICU ROOM

## 2017-05-21 PROCEDURE — 83735 ASSAY OF MAGNESIUM: CPT

## 2017-05-21 PROCEDURE — 25000242 PHARM REV CODE 250 ALT 637 W/ HCPCS: Performed by: NURSE PRACTITIONER

## 2017-05-21 PROCEDURE — 94640 AIRWAY INHALATION TREATMENT: CPT

## 2017-05-21 RX ORDER — CARVEDILOL 3.12 MG/1
3.12 TABLET ORAL 2 TIMES DAILY
Status: DISCONTINUED | OUTPATIENT
Start: 2017-05-21 | End: 2017-05-22

## 2017-05-21 RX ORDER — CYANOCOBALAMIN 1000 UG/ML
1000 INJECTION, SOLUTION INTRAMUSCULAR; SUBCUTANEOUS ONCE
Status: COMPLETED | OUTPATIENT
Start: 2017-05-21 | End: 2017-05-21

## 2017-05-21 RX ORDER — POTASSIUM CHLORIDE 20 MEQ/15ML
40 SOLUTION ORAL ONCE
Status: COMPLETED | OUTPATIENT
Start: 2017-05-21 | End: 2017-05-21

## 2017-05-21 RX ORDER — QUETIAPINE FUMARATE 25 MG/1
25 TABLET, FILM COATED ORAL NIGHTLY
Status: DISCONTINUED | OUTPATIENT
Start: 2017-05-21 | End: 2017-05-22

## 2017-05-21 RX ORDER — METOPROLOL TARTRATE 25 MG/1
12.5 TABLET ORAL 2 TIMES DAILY
Status: DISCONTINUED | OUTPATIENT
Start: 2017-05-21 | End: 2017-05-21

## 2017-05-21 RX ADMIN — HEPARIN SODIUM AND DEXTROSE 18 UNITS/KG/HR: 10000; 5 INJECTION INTRAVENOUS at 08:05

## 2017-05-21 RX ADMIN — ARFORMOTEROL TARTRATE 15 MCG: 15 SOLUTION RESPIRATORY (INHALATION) at 07:05

## 2017-05-21 RX ADMIN — PANTOPRAZOLE SODIUM 40 MG: 40 INJECTION, POWDER, FOR SOLUTION INTRAVENOUS at 08:05

## 2017-05-21 RX ADMIN — IPRATROPIUM BROMIDE AND ALBUTEROL SULFATE 3 ML: .5; 3 SOLUTION RESPIRATORY (INHALATION) at 01:05

## 2017-05-21 RX ADMIN — LACTULOSE 15 G: 10 SOLUTION ORAL at 05:05

## 2017-05-21 RX ADMIN — BUDESONIDE 0.5 MG: 0.5 SUSPENSION RESPIRATORY (INHALATION) at 07:05

## 2017-05-21 RX ADMIN — SODIUM CHLORIDE: 0.9 INJECTION, SOLUTION INTRAVENOUS at 05:05

## 2017-05-21 RX ADMIN — IPRATROPIUM BROMIDE AND ALBUTEROL SULFATE 3 ML: .5; 3 SOLUTION RESPIRATORY (INHALATION) at 07:05

## 2017-05-21 RX ADMIN — POTASSIUM CHLORIDE 40 MEQ: 20 SOLUTION ORAL at 11:05

## 2017-05-21 RX ADMIN — CHLORDIAZEPOXIDE HYDROCHLORIDE 50 MG: 25 CAPSULE ORAL at 06:05

## 2017-05-21 RX ADMIN — SODIUM CHLORIDE 500 ML: 0.9 INJECTION, SOLUTION INTRAVENOUS at 07:05

## 2017-05-21 RX ADMIN — IPRATROPIUM BROMIDE AND ALBUTEROL SULFATE 3 ML: .5; 3 SOLUTION RESPIRATORY (INHALATION) at 09:05

## 2017-05-21 RX ADMIN — CYANOCOBALAMIN 1000 MCG: 1000 INJECTION, SOLUTION INTRAMUSCULAR; SUBCUTANEOUS at 09:05

## 2017-05-21 RX ADMIN — CARVEDILOL 3.12 MG: 3.12 TABLET, FILM COATED ORAL at 08:05

## 2017-05-21 RX ADMIN — CHLORDIAZEPOXIDE HYDROCHLORIDE 50 MG: 25 CAPSULE ORAL at 12:05

## 2017-05-21 RX ADMIN — ASPIRIN 81 MG: 81 TABLET, COATED ORAL at 08:05

## 2017-05-21 RX ADMIN — LACTULOSE 15 G: 10 SOLUTION ORAL at 10:05

## 2017-05-21 RX ADMIN — LACTULOSE 15 G: 10 SOLUTION ORAL at 01:05

## 2017-05-21 RX ADMIN — QUETIAPINE FUMARATE 25 MG: 25 TABLET, FILM COATED ORAL at 08:05

## 2017-05-21 NOTE — PLAN OF CARE
Problem: Patient Care Overview  Goal: Plan of Care Review  Outcome: Ongoing (interventions implemented as appropriate)  Plan of care review with patient, patient rested well last night, no agitation or restlessness noted, no sedative administered, on Heparin drip APTT within therapeutic level. Low urine output this shift. Sinus rhythm on monitor.afebrile BP borderline low with MAP >65.

## 2017-05-21 NOTE — PROGRESS NOTES
[facility] Hospital Medicine  Progress Note    Patient Name: Jeniffer Fernandez  MRN: [id number]  Patient Class: IP- Inpatient   Admission Date: 5/14/2017  Length of Stay: 7 days  Attending Physician: Lewis Suarez MD  Primary Care Provider: Ben Pelaez Jr, MD        Subjective:     Principal Problem:Shock circulatory    HPI:  No notes on file    Hospital Course:  Very agitated late morning 16 May and received Haldol and Ativan and slept most of the day.  She was better the following morning but became more confused and agitated in the evening and combative with staff.  Increased wheezing and pulling her oxygen off with desaturations.  When stable her blood gas showed adequate oxygenation and a pCO2 only slightly high at 50.  Transferred to ICU for respiratory failure. Was started on Precedex that has now been discontinued.    5/18 - Continued to have intermittent periods of agitation. Negative head CT and Neurology consult placed.    5/19 - Evening agitation.    5/20 - Started heparin drip and aspirin per Cardiology recommendations.  Echo shows HF with reduced ejection fraction of 30% and slightly elevated troponin.    Interval History:  Recurrent pattern of agitation/panic in the evening but often does well during the day.  No significant problems overnight last night.  Continues to oxygenate well with complaints of shortness of breath but desaturates off oxygen when agitatied.    Review of Systems   Unable to perform ROS: Mental status change   Constitutional: Negative for chills and fever.   HENT: Negative for congestion and sore throat.    Eyes: Negative for visual disturbance.   Respiratory: Positive for shortness of breath.    Cardiovascular: Negative for chest pain, palpitations and leg swelling.   Gastrointestinal: Negative for abdominal pain, blood in stool, constipation, diarrhea, nausea and vomiting.   Genitourinary: Negative for dysuria and hematuria.   Musculoskeletal: Negative for  arthralgias and back pain.   Skin: Negative for rash and wound.   Neurological: Negative for dizziness, weakness, light-headedness and numbness.   Hematological: Negative for adenopathy.     Objective:     Vital Signs (Most Recent):  Temp: 98.2 °F (36.8 °C) (05/21/17 1105)  Pulse: (!) 112 (05/21/17 1312)  Resp: (!) 24 (05/21/17 1312)  BP: (!) 136/101 (05/21/17 1300)  SpO2: 98 % (05/21/17 1312) Vital Signs (24h Range):  Temp:  [97.8 °F (36.6 °C)-99 °F (37.2 °C)] 98.2 °F (36.8 °C)  Pulse:  [] 112  Resp:  [0-26] 24  SpO2:  [90 %-100 %] 98 %  BP: ()/() 136/101     Weight: 47.1 kg (103 lb 13.4 oz)  Body mass index is 17.82 kg/m².    Intake/Output Summary (Last 24 hours) at 05/21/17 1350  Last data filed at 05/21/17 1300   Gross per 24 hour   Intake          3442.25 ml   Output              540 ml   Net          2902.25 ml      Physical Exam   Constitutional: She appears well-developed and well-nourished. No distress.   HENT:   Head: Normocephalic and atraumatic.   Mouth/Throat: Oropharynx is clear and moist.   Eyes: Conjunctivae and EOM are normal. Pupils are equal, round, and reactive to light.   Neck: Neck supple. No JVD present. No thyromegaly present.   Cardiovascular: Normal rate and regular rhythm.  Exam reveals no gallop and no friction rub.    No murmur heard.  Pulmonary/Chest: She has wheezes. She has no rales.   Coarse crackles bilaterally.  Expiratory wheezes and generally reduced breath sounds.   Abdominal: Soft. Bowel sounds are normal. She exhibits no distension. There is no tenderness. There is no rebound and no guarding.   Musculoskeletal: Normal range of motion. She exhibits no edema or deformity.   Lymphadenopathy:     She has no cervical adenopathy.   Neurological: She has normal reflexes.   Somnolent   Skin: Skin is warm and dry. No rash noted.   Nursing note and vitals reviewed.      Significant Labs:   CBC:     Recent Labs  Lab 05/20/17  0401 05/21/17  0418   WBC 7.97 8.33   HGB  11.2* 11.3*   HCT 35.4* 35.1*    214     CMP:     Recent Labs  Lab 05/20/17  0400 05/21/17  0418   * 144   K 4.0 3.4*   * 109   CO2 24 28   GLU 83 128*   BUN 22* 16   CREATININE 0.6 0.6   CALCIUM 7.9* 7.9*   PROT 5.1* 4.9*   ALBUMIN 2.8* 2.6*   BILITOT 0.3 0.3   ALKPHOS 105 91   AST 91* 45*   * 128*   ANIONGAP 11 7*   EGFRNONAA >60 >60       Significant Imaging: I have reviewed all pertinent imaging results/findings within the past 24 hours.    Assessment/Plan:      Systolic heart failure with probable CAD    Most likely present when she was admitted given her history and risk factors.  Echo showed ejection fraction of 30%.  Cardiology on consult with recommendations.  Optimize medical management.  Suspect ischemic heart disease and anticipate LHC early next week.        LFT elevation    LFT's were normal on admission.  Check daily LFTs.  Lactulose given yesterday and patient's ammonia level decreased but is up to 55 today.  Liver Ultrasound and hepatitis panel.        Delirium due to another medical condition, acute, hyperactive    Her episodes of confusion and agitation are unclear.  She was not hypoxic and only slightly hypercapneic.  She has a history of depression and misuse of medication and alcohol.  Her  has been consistent, helpful, and seems reliable in thinking she has not been drinking heavily or consistently in the recent weeks. CT head is negative for an acute intracranial process.  Neurology evaluated.  EEG unrevealing.  Ammonia level has normalized.        Acute on chronic respiratory failure with hypoxia    Multifactorial from COPD, heart failure, and pneumonia.  Supplemental oxygen to maintain sat > 92 % - wean as appropriate.  She has severe underlying COPD with most recent PFT in 2014 and has continued to smoke since then.  Pulmonary following.        COPD exacerbation    Supplemental oxygen  Xopenex  IV Corticosteroids        Tobacco abuse    Must quit smoking  and alcohol.  Denies need for Nicotine patch        Bipolar disorder    Holding all psychiatric meds.  She endorsed a history of misuse of Alcohol and Latuda.   denies any formal diagnosis of Bipolar Disorder.        Anxiety    Holding all home psychiatric medications.  Careful use of Benzodiazepines with severe COPD.  PRN Ativan for agitation.  Scheduled Seroquel.        * Shock circulatory    Responded well to careful IV fluid boluses.  Heart failure with reduced EF.          VTE Risk Mitigation         Ordered     Medium Risk of VTE  Once      05/14/17 1132     Place sequential compression device  Until discontinued      05/14/17 1132          Lewis Suarez MD  Department of Hospital Medicine   [facility]    Critical care time:  30 minutes

## 2017-05-21 NOTE — PROGRESS NOTES
Received report from YAHAIRA Ramos.  Physical Assessment documented per flowsheet.  Noted with left hand, mottled, blue around knuckle area.  Doppler radial pulse.  Jose VALENCIA and NIKKIE Suarez MD notified and aware.  Right hand cold, but color pale.  Both hands edematous.  Patient follows commands, with some slight weakness noted to left hand.  Will continue to monitor patient.

## 2017-05-21 NOTE — HOSPITAL COURSE
Very agitated late morning 16 May and received Haldol and Ativan and slept most of the day.  She was better the following morning but became more confused and agitated in the evening and combative with staff.  Increased wheezing and pulling her oxygen off with desaturations.  When stable her blood gas showed adequate oxygenation and a pCO2 only slightly high at 50.  Transferred to ICU for respiratory failure. Was started on Precedex that has now been discontinued.  Continued to have intermittent periods of agitation. Negative head CT and Neurology consult placed and they diagnosed toxic encephalopathy related to sedating medications and Ceftriaxone related reversible encephalopathy.  Withdrew medications as appropriate including Ceftriaxone given her consistent improvement in respiratory status.  Evening agitation.  Started heparin drip and aspirin per Cardiology recommendations.  Echo shows HF with reduced ejection fraction of 30% and slightly elevated troponin.  Symptoms of agitation have improved.  NM stress test revealed fixed left ventricular wall defect and reduced ejection fraction.  Optimal medical management indicated so we started low dose Lisinopril and Carvedilol given low blood pressures and Aspirin, Plavix, and Atorvastatin.  Patient with continued improved in mental status but complained of persistent double vision.  MRI brain revealed no acute stroke or evidence of hypoxic injury.  Some intermittent agitation in the evening.  Case management reviewed pursued rehab placement but she did not qualify.  Home health and Cardiology follow up arranged.  I have seen and examined Ms. Fernandez on the day of discharge and deemed her appropriate to return home.

## 2017-05-21 NOTE — PROGRESS NOTES
Progress Note  Critical Care    Admit Date: 5/14/2017   LOS: 7 days     Follow-up For: Resp Failure     SUBJECTIVE:     New over last 24 hours: Still restless at night requiring Precedex infusion now lethargic.  Failed removal of NPPV this AM.  SBP in 60s this AM.    ROS  Review of Systems   Unable to perform ROS: Mental acuity       Continuous Infusions:   sodium chloride 0.9% 50 mL/hr at 05/21/17 0900    dexmedetomidine (PRECEDEX) infusion Stopped (05/20/17 0815)    heparin (porcine) in D5W 18 Units/kg/hr (05/21/17 0900)     Review of patient's allergies indicates:   Allergen Reactions    Codeine Itching    Codeine phosphate      Itchy (skin)^       OBJECTIVE:     Vital Signs (Most Recent)  Temp: 97.8 °F (36.6 °C) (05/21/17 0705)  Pulse: 103 (05/21/17 0934)  Resp: (!) 22 (05/21/17 0934)  BP: (!) 113/91 (05/21/17 0900)  SpO2: 98 % (05/21/17 0934)    Vital Signs Range (Last 24H):  Temp:  [97.8 °F (36.6 °C)-99 °F (37.2 °C)]   Pulse:  []   Resp:  [0-26]   BP: ()/(40-91)   SpO2:  [90 %-100 %]     I & O (Last 24H):  Intake/Output Summary (Last 24 hours) at 05/21/17 1035  Last data filed at 05/21/17 0900   Gross per 24 hour   Intake          3442.25 ml   Output              485 ml   Net          2957.25 ml        Oxygen Concentration (%):  [30] 30    Physical Exam:    Physical Exam   Constitutional: She appears lethargic. She appears to not be writhing in pain and not jaundiced. She appears unhealthy.  Non-toxic appearance. She has a sickly appearance. No distress (on NPPV). She is not intubated.   HENT:   Head: Normocephalic and atraumatic.   Mouth/Throat: Mucous membranes are dry.       Eyes: EOM and lids are normal. Pupils are equal, round, and reactive to light.   Neck: Neck supple. Carotid bruit is not present.   Cardiovascular: Normal rate and regular rhythm.    Pulses:       Radial pulses are 1+ on the right side, and 1+ on the left side.        Dorsalis pedis pulses are 1+ on the right side, and  1+ on the left side.   Pulmonary/Chest: Accessory muscle usage (mild on NPPV) present. No tachypnea. She is not intubated. No respiratory distress (on NPPV). She has decreased breath sounds.   Abdominal: Soft. Normal appearance. She exhibits no distension. Bowel sounds are hypoactive. There is no tenderness.   Genitourinary:   Genitourinary Comments: Rodriguez    Musculoskeletal: Normal range of motion.   bilat hand edema   Lymphadenopathy:     She has no cervical adenopathy.     She has no axillary adenopathy.   Neurological: She appears lethargic.   Eyes closed but nods head to questions slowly and follows one step commands but not verbally responsive.   Skin: Skin is warm and dry. Bruising (bilat hands and UEs) noted. She is not diaphoretic. No cyanosis.   Psychiatric: She exhibits disordered thought content, abnormal recent memory and abnormal remote memory. She has a flat affect.       Laboratory (Last 24H):  CBC:    Recent Labs  Lab 05/21/17 0418   WBC 8.33   HGB 11.3*   HCT 35.1*        CMP:    Recent Labs  Lab 05/21/17 0418   CALCIUM 7.9*   ALBUMIN 2.6*   PROT 4.9*      K 3.4*   CO2 28      BUN 16   CREATININE 0.6   ALKPHOS 91   *   AST 45*   BILITOT 0.3       Coagulation:   Recent Labs  Lab 05/20/17  0911  05/21/17  0418   INR 1.0  --   --    APTT 23.1  < > 44.3*   < > = values in this interval not displayed.  Microbiology Results (last 7 days)     Procedure Component Value Units Date/Time    Blood culture [[id number]] Collected:  05/14/17 0815    Order Status:  Completed Specimen:  Blood from Peripheral, Forearm, Left Updated:  05/19/17 2012     Blood Culture, Routine No growth after 5 days.    Blood culture [[id number]] Collected:  05/14/17 0800    Order Status:  Completed Specimen:  Blood from Peripheral, Forearm, Left Updated:  05/19/17 2012     Blood Culture, Routine No growth after 5 days.    Culture, Respiratory with Gram Stain [[id number]] Collected:  05/14/17 1640    Order  Status:  Completed Specimen:  Respiratory from Sputum, Expectorated Updated:  05/16/17 1323     Respiratory Culture --     HAEMOPHILUS INFLUENZAE  Few  Beta Lactamase negative  Normal respiratory markus also present       Gram Stain (Respiratory) <10 epithelial cells per low power field.     Gram Stain (Respiratory) Moderate WBC's     Gram Stain (Respiratory) No organisms seen          Chest X-Ray:  Film and report reviewed:  Hyperinflation but no acute process noted.       ASSESSMENT/PLAN:     Problem   Shock Circulatory   Acute On Chronic Respiratory Failure With Hypoxia   Delirium Due to Another Medical Condition, Acute, Hyperactive   Copd Exacerbation   Systolic heart failure with probable CAD   Lft Elevation   Depression   Anxiety   Tobacco Abuse   Pneumonia Involving Right Lung (Resolved)       PLAN:    1. Neuro: ICU neuro monitoring off Precedex infusion and stop Librium.  Cont Seroquel.  Neuro following, no seizure on EEG.    2. Pulmonary: Encourage C&DB and OOB asap.  Cont Duonebs, Formoterol, Budesonide.  NPPV  Day #3 wean as tolerated.  Avoiding steroids due to agitation episode.  If unable to wean NPPV will need elective intubation and likely long term support.     3. Cardiac: ICU Cardiac monitoring and Cards following.  Planning C once pulm status stable.  Cont Heparin infusion and Lopressor.  BP improved with fluid bolus.      4. Renal: Rodriguez in place, monitor I/Os     5. Infectious Disease: Follow fever curve    6. Hematology/Oncology: monitor for bleeding    7. Endocrine:  Monitor glucose    8. Fluids/Electrolytes/Nutrition/GI: cont bolus TF and Lactulose.  Replace K+.   Abd US unremarkable except for mild GB wall thickening and hepatic lobe cyst    9. Musculoskeletal:  ROM    10. Pain Management: no issues     11. Discharge and Palliative Care: Attempting to avoid intubation.  LTAC vs home with home health.     Preventive Measures and Monitoring:   Stress Ulcer: Pepcid  Nutrition: Bolus TF  Glucose  control: stable  Bowel prophylaxis: PRN Dulcolax  DVT prophylaxis: Heparin/SCDs  Hx CAD on B-Blocker: Lopressor  Head of Bed/Reposition: Elevate HOB and turn Q1-2 hours    Early Mobility: OOB asap  NG Day: #3  Rodriguez Day: #3  Code Status: Full    Counseling/Consultation: I have discussed the care of this patient in detail with Multidisciplinary team during rounds, bedside nursing staff and Dr. Plata and Dr. Suarez    Critical Care Time greater than: 62 minutes    Critical care was time spent personally by me on the following activities: development of treatment plan with patient or surrogate and bedside caregivers, discussions with consultants, evaluation of patient's response to treatment, examination of patient, ordering and performing treatments and interventions, ordering and review of laboratory studies, ordering and review of radiographic studies, pulse oximetry, re-evaluation of patient's condition.  This critical care time did not overlap with that of any other provider or involve time for any procedures.    POLLY Olsen [facility]  [facility]

## 2017-05-21 NOTE — SUBJECTIVE & OBJECTIVE
Interval History:  Recurrent pattern of agitation/panic in the evening but often does well during the day.  No significant problems overnight last night.  Continues to oxygenate well with complaints of shortness of breath but desaturates off oxygen when agitatied.    Review of Systems   Unable to perform ROS: Mental status change   Constitutional: Negative for chills and fever.   HENT: Negative for congestion and sore throat.    Eyes: Negative for visual disturbance.   Respiratory: Positive for shortness of breath.    Cardiovascular: Negative for chest pain, palpitations and leg swelling.   Gastrointestinal: Negative for abdominal pain, blood in stool, constipation, diarrhea, nausea and vomiting.   Genitourinary: Negative for dysuria and hematuria.   Musculoskeletal: Negative for arthralgias and back pain.   Skin: Negative for rash and wound.   Neurological: Negative for dizziness, weakness, light-headedness and numbness.   Hematological: Negative for adenopathy.     Objective:     Vital Signs (Most Recent):  Temp: 98.2 °F (36.8 °C) (05/21/17 1105)  Pulse: (!) 112 (05/21/17 1312)  Resp: (!) 24 (05/21/17 1312)  BP: (!) 136/101 (05/21/17 1300)  SpO2: 98 % (05/21/17 1312) Vital Signs (24h Range):  Temp:  [97.8 °F (36.6 °C)-99 °F (37.2 °C)] 98.2 °F (36.8 °C)  Pulse:  [] 112  Resp:  [0-26] 24  SpO2:  [90 %-100 %] 98 %  BP: ()/() 136/101     Weight: 47.1 kg (103 lb 13.4 oz)  Body mass index is 17.82 kg/m².    Intake/Output Summary (Last 24 hours) at 05/21/17 1350  Last data filed at 05/21/17 1300   Gross per 24 hour   Intake          3442.25 ml   Output              540 ml   Net          2902.25 ml      Physical Exam   Constitutional: She appears well-developed and well-nourished. No distress.   HENT:   Head: Normocephalic and atraumatic.   Mouth/Throat: Oropharynx is clear and moist.   Eyes: Conjunctivae and EOM are normal. Pupils are equal, round, and reactive to light.   Neck: Neck supple. No JVD  present. No thyromegaly present.   Cardiovascular: Normal rate and regular rhythm.  Exam reveals no gallop and no friction rub.    No murmur heard.  Pulmonary/Chest: She has wheezes. She has no rales.   Coarse crackles bilaterally.  Expiratory wheezes and generally reduced breath sounds.   Abdominal: Soft. Bowel sounds are normal. She exhibits no distension. There is no tenderness. There is no rebound and no guarding.   Musculoskeletal: Normal range of motion. She exhibits no edema or deformity.   Lymphadenopathy:     She has no cervical adenopathy.   Neurological: She has normal reflexes.   Somnolent   Skin: Skin is warm and dry. No rash noted.   Nursing note and vitals reviewed.      Significant Labs:   CBC:     Recent Labs  Lab 05/20/17  0401 05/21/17  0418   WBC 7.97 8.33   HGB 11.2* 11.3*   HCT 35.4* 35.1*    214     CMP:     Recent Labs  Lab 05/20/17  0400 05/21/17  0418   * 144   K 4.0 3.4*   * 109   CO2 24 28   GLU 83 128*   BUN 22* 16   CREATININE 0.6 0.6   CALCIUM 7.9* 7.9*   PROT 5.1* 4.9*   ALBUMIN 2.8* 2.6*   BILITOT 0.3 0.3   ALKPHOS 105 91   AST 91* 45*   * 128*   ANIONGAP 11 7*   EGFRNONAA >60 >60       Significant Imaging: I have reviewed all pertinent imaging results/findings within the past 24 hours.

## 2017-05-21 NOTE — PROGRESS NOTES
"Last night she did show mild agitation and confusion. Nurse reported as follows:  "Patient drowsy, but starting to repeat, "Help Me, Help Me."  Restless and beginning to move around in bed.  Overall situation is not as prominent agitation as noted previous nights, but patient's LOC is definitely increased and a change from throughout the entire shift. Will give Seroquel early as instructed by Dr. Grossman.  Will continue to monitor patient."    Plan: Continue Seroquel in the evening , which will help psychosis .  Will sign off.  "

## 2017-05-21 NOTE — ASSESSMENT & PLAN NOTE
Holding all home psychiatric medications.  Careful use of Benzodiazepines with severe COPD.  PRN Ativan for agitation.  Scheduled Seroquel.

## 2017-05-21 NOTE — PROGRESS NOTES
"Patient restless, stating she has to "poo."  Placed patient on bedpan, with thin brown bowel movement result.  Catheter care, pericare provided; incontinence pad changed.  Patient remains restless, stating, "Help Me."  Audible wheezing noted.  O2 sats stable.  Placed patient on BiPAP for comfort.  "

## 2017-05-21 NOTE — PLAN OF CARE
Problem: Patient Care Overview  Goal: Plan of Care Review  Outcome: Ongoing (interventions implemented as appropriate)  On and off BiPAP multiple times throughout the day.  With any exertion, mostly during attempting to have a bowel movement, patient in respiratory distress and has complaints that she cannot breathe.  Placed on BiPAP.  Recovers after a few hours on BiPAP and is able to go back on NC.  Family visited and  updated on POC.  All questions answered.  Librium D/C'd.  Patient harder to maintain turned on wedge today; often repositioning herself off the wedge.  Continued to attempt to turn q2h.  Blood pressure low at beginning of shift; responded well to fluid bolus.  Heparin gtt continues at therapeutic level, recheck in AM.  Remains confused, but better able to communicate with family.  Bolus feedings continue, tolerating with no residual noted before each feed.

## 2017-05-21 NOTE — PROGRESS NOTES
Placed patient on BiPAP at this time. Mepilex in place, no redness or breakdown noted at this time. Patient tolerates well

## 2017-05-21 NOTE — PROGRESS NOTES
Cardiology Progress Note        SUBJECTIVE:     History of Present Illness:  Patient is a 57 y.o. female presents with delirium. EKG with st inversion precordial leads, echo- EF=30%,   Very mild increase troponin. Pt still not alert.      OBJECTIVE:     Vital Signs (Most Recent)  Temp: 97.8 °F (36.6 °C) (05/21/17 0705)  Pulse: 103 (05/21/17 0934)  Resp: (!) 22 (05/21/17 0934)  BP: (!) 113/91 (05/21/17 0900)  SpO2: 98 % (05/21/17 0934)    Vital Signs Range (Last 24H):  Temp:  [97.8 °F (36.6 °C)-99 °F (37.2 °C)]   Pulse:  []   Resp:  [0-26]   BP: ()/(40-91)   SpO2:  [90 %-100 %]     Intake/Output last 3 shifts:  I/O last 3 completed shifts:  In: 3245 [I.V.:2135; NG/GT:1110]  Out: 810 [Urine:810]    Intake/Output this shift:  I/O this shift:  In: 990 [NG/GT:490; IV Piggyback:500]  Out: 55 [Urine:55]    Review of patient's allergies indicates:   Allergen Reactions    Codeine Itching    Codeine phosphate      Itchy (skin)^       Current Facility-Administered Medications   Medication    0.9%  NaCl infusion    acetaminophen tablet 650 mg    albuterol-ipratropium 2.5mg-0.5mg/3mL nebulizer solution 3 mL    albuterol-ipratropium 2.5mg-0.5mg/3mL nebulizer solution 3 mL    aluminum-magnesium hydroxide-simethicone 200-200-20 mg/5 mL suspension 30 mL    arformoterol nebulizer solution 15 mcg    aspirin EC tablet 81 mg    budesonide nebulizer solution 0.5 mg    dexmedetomidine (PRECEDEX) 400mcg/100mL 0.9% NaCL infusion    haloperidol lactate injection 5 mg    heparin 25,000 units in dextrose 5% 250 mL (100 units/mL) bolus from bag; PRN BOLUS    heparin 25,000 units in dextrose 5% 250 mL (100 units/mL) bolus from bag; PRN BOLUS    heparin 25,000 units in dextrose 5% 250 mL (100 units/mL) infusion; FEMALE    lactulose 20 gram/30 mL solution Soln 15 g    lorazepam injection 2 mg    metoprolol tartrate (LOPRESSOR) split tablet 12.5 mg    ondansetron injection 4 mg    pantoprazole injection 40 mg     quetiapine tablet 25 mg     No current facility-administered medications on file prior to encounter.      Current Outpatient Prescriptions on File Prior to Encounter   Medication Sig    amitriptyline (ELAVIL) 50 MG tablet Take 1 tablet (50 mg total) by mouth every evening. (Patient taking differently: Take 25 mg by mouth 3 (three) times daily. )    lorazepam (ATIVAN) 0.5 MG tablet Take 0.5 mg by mouth every 6 (six) hours as needed for Anxiety.    montelukast (SINGULAIR) 10 mg tablet Take 10 mg by mouth every evening.    paroxetine (PAXIL) 20 MG tablet Take 40 mg by mouth every morning.     predniSONE (DELTASONE) 10 MG tablet Please take 20mg twice daily x 3 days, then take 10 mg twice daily x 2 days, then take 10 mg once daily x 2 days    ropinirole (REQUIP) 1 MG tablet Take 1 mg by mouth every evening.    b complex vitamins tablet Take 1 tablet by mouth once daily.    docusate sodium (COLACE) 100 MG capsule Take 1 capsule (100 mg total) by mouth 2 (two) times daily.    fluticasone (FLONASE) 50 mcg/actuation nasal spray 2 sprays by Each Nare route once daily.    mometasone-formoterol (DULERA) 200-5 mcg/actuation inhaler Inhale 2 puffs into the lungs 2 (two) times daily.    nicotine (NICODERM CQ) 21 mg/24 hr Place 1 patch onto the skin once daily.       Physical Exam:  General appearance: alert, appears stated age and cooperative  Head: Normocephalic, without obvious abnormality, atraumatic  Eyes:  conjunctivae/corneas clear. PERRL, EOM's intact. Fundi benign.  Nose: no discharge  Throat: normal findings: tongue midline and normal  Neck: no adenopathy, no carotid bruit, no JVD, supple, symmetrical, trachea midline and thyroid not enlarged, symmetric, no tenderness/mass/nodules  Back:  no skin lesions, erythema, or scars  Lungs:  clear to auscultation bilaterally  Chest wall: no tenderness  Heart: regular rate and rhythm, S1, S2 normal, no murmur, click, rub or gallop  Abdomen: soft, non-tender; bowel  sounds normal; no masses,  no organomegaly  Extremities: extremities normal, atraumatic, no cyanosis or edema  Pulses: 2+ and symmetric  Skin: Skin color, texture, turgor normal. No rashes or lesions  Neurologic: Grossly normal    Laboratory:  Chemistry:   Lab Results   Component Value Date     05/21/2017    K 3.4 (L) 05/21/2017     05/21/2017    CO2 28 05/21/2017    BUN 16 05/21/2017    CREATININE 0.6 05/21/2017    CALCIUM 7.9 (L) 05/21/2017     Cardiac Markers:   Lab Results   Component Value Date    TROPONINI 0.100 (H) 05/20/2017     Cardiac Markers (Last 3):   Lab Results   Component Value Date    TROPONINI 0.100 (H) 05/20/2017    TROPONINI 0.107 (H) 05/19/2017    TROPONINI 0.128 (H) 05/19/2017     CBC:   Lab Results   Component Value Date    WBC 8.33 05/21/2017    HGB 11.3 (L) 05/21/2017    HCT 35.1 (L) 05/21/2017     (H) 05/21/2017     05/21/2017     Lipids: No results found for: CHOL, TRIG, HDL, LDLDIRECT  Coagulation:   Lab Results   Component Value Date    INR 1.0 05/20/2017    APTT 44.3 (H) 05/21/2017       Diagnostic Results:  ECG: Reviewed  X-Ray: Reviewed  US: Reviewed  CT: Reviewed  Echo: Reviewed      ASSESSMENT/PLAN:     Patient Active Problem List   Diagnosis    Closed fracture of proximal end of left humerus    Fracture of proximal humerus    Pneumonia    Depression    Anxiety    Bipolar disorder    COPD, severe    Tobacco abuse    Abdominal pain    Lung infiltrate    COPD exacerbation    Acute on chronic respiratory failure with hypoxia    Delirium due to another medical condition, acute, hyperactive    LFT elevation    Systolic heart failure with probable CAD    Shock circulatory        Plan: continue IV heparin, consider cath if encephalopathy improved

## 2017-05-21 NOTE — ASSESSMENT & PLAN NOTE
Holding all psychiatric meds.  She endorsed a history of misuse of Alcohol and Latuda.   denies any formal diagnosis of Bipolar Disorder.

## 2017-05-21 NOTE — ASSESSMENT & PLAN NOTE
Most likely present when she was admitted given her history and risk factors.  Echo showed ejection fraction of 30%.  Cardiology on consult with recommendations.  Optimize medical management.  Suspect ischemic heart disease and anticipate LHC early next week.

## 2017-05-22 PROBLEM — R57.9 SHOCK CIRCULATORY: Status: RESOLVED | Noted: 2017-05-21 | Resolved: 2017-05-22

## 2017-05-22 PROBLEM — I50.22 CHRONIC SYSTOLIC HEART FAILURE: Status: ACTIVE | Noted: 2017-05-20

## 2017-05-22 LAB
ALLENS TEST: ABNORMAL
ANION GAP SERPL CALC-SCNC: 6 MMOL/L
APTT BLDCRRT: 34.1 SEC
APTT BLDCRRT: 41.4 SEC
BUN SERPL-MCNC: 11 MG/DL
CALCIUM SERPL-MCNC: 7.9 MG/DL
CHLORIDE SERPL-SCNC: 108 MMOL/L
CO2 SERPL-SCNC: 29 MMOL/L
CREAT SERPL-MCNC: 0.6 MG/DL
DELSYS: ABNORMAL
EP: 5
ERYTHROCYTE [SEDIMENTATION RATE] IN BLOOD BY WESTERGREN METHOD: 14 MM/H
EST. GFR  (AFRICAN AMERICAN): >60 ML/MIN/1.73 M^2
EST. GFR  (NON AFRICAN AMERICAN): >60 ML/MIN/1.73 M^2
FIO2: 30
GLUCOSE SERPL-MCNC: 114 MG/DL
HAV IGM SERPL QL IA: NEGATIVE
HBV CORE IGM SERPL QL IA: NEGATIVE
HBV SURFACE AG SERPL QL IA: NEGATIVE
HCO3 UR-SCNC: 28.8 MMOL/L (ref 24–28)
HCV AB SERPL QL IA: NEGATIVE
IP: 12
MAGNESIUM SERPL-MCNC: 1.9 MG/DL
MODE: ABNORMAL
PCO2 BLDA: 43.6 MMHG (ref 35–45)
PH SMN: 7.43 [PH] (ref 7.35–7.45)
PO2 BLDA: 65 MMHG (ref 80–100)
POC BE: 4 MMOL/L
POC SATURATED O2: 93 % (ref 95–100)
POTASSIUM SERPL-SCNC: 3.6 MMOL/L
SAMPLE: ABNORMAL
SITE: ABNORMAL
SODIUM SERPL-SCNC: 143 MMOL/L

## 2017-05-22 PROCEDURE — 97802 MEDICAL NUTRITION INDIV IN: CPT

## 2017-05-22 PROCEDURE — 25000242 PHARM REV CODE 250 ALT 637 W/ HCPCS: Performed by: NURSE PRACTITIONER

## 2017-05-22 PROCEDURE — 25000003 PHARM REV CODE 250: Performed by: PSYCHIATRY & NEUROLOGY

## 2017-05-22 PROCEDURE — C9113 INJ PANTOPRAZOLE SODIUM, VIA: HCPCS | Performed by: INTERNAL MEDICINE

## 2017-05-22 PROCEDURE — 25000003 PHARM REV CODE 250: Performed by: HOSPITALIST

## 2017-05-22 PROCEDURE — 63600175 PHARM REV CODE 636 W HCPCS: Performed by: HOSPITALIST

## 2017-05-22 PROCEDURE — 85730 THROMBOPLASTIN TIME PARTIAL: CPT

## 2017-05-22 PROCEDURE — 36600 WITHDRAWAL OF ARTERIAL BLOOD: CPT

## 2017-05-22 PROCEDURE — 36415 COLL VENOUS BLD VENIPUNCTURE: CPT

## 2017-05-22 PROCEDURE — 99233 SBSQ HOSP IP/OBS HIGH 50: CPT | Mod: ,,, | Performed by: INTERNAL MEDICINE

## 2017-05-22 PROCEDURE — 25000003 PHARM REV CODE 250: Performed by: NURSE PRACTITIONER

## 2017-05-22 PROCEDURE — 99900035 HC TECH TIME PER 15 MIN (STAT)

## 2017-05-22 PROCEDURE — 63600175 PHARM REV CODE 636 W HCPCS: Performed by: INTERNAL MEDICINE

## 2017-05-22 PROCEDURE — 27000221 HC OXYGEN, UP TO 24 HOURS

## 2017-05-22 PROCEDURE — 25000003 PHARM REV CODE 250: Performed by: INTERNAL MEDICINE

## 2017-05-22 PROCEDURE — 80048 BASIC METABOLIC PNL TOTAL CA: CPT

## 2017-05-22 PROCEDURE — 82803 BLOOD GASES ANY COMBINATION: CPT

## 2017-05-22 PROCEDURE — 83735 ASSAY OF MAGNESIUM: CPT

## 2017-05-22 PROCEDURE — 20000000 HC ICU ROOM

## 2017-05-22 PROCEDURE — 85730 THROMBOPLASTIN TIME PARTIAL: CPT | Mod: 91

## 2017-05-22 PROCEDURE — 99291 CRITICAL CARE FIRST HOUR: CPT | Mod: ,,, | Performed by: NURSE PRACTITIONER

## 2017-05-22 PROCEDURE — 63600175 PHARM REV CODE 636 W HCPCS: Performed by: PSYCHIATRY & NEUROLOGY

## 2017-05-22 PROCEDURE — 94640 AIRWAY INHALATION TREATMENT: CPT

## 2017-05-22 RX ORDER — CARVEDILOL 3.12 MG/1
3.12 TABLET ORAL 2 TIMES DAILY
Status: DISCONTINUED | OUTPATIENT
Start: 2017-05-22 | End: 2017-05-27 | Stop reason: HOSPADM

## 2017-05-22 RX ORDER — POTASSIUM CHLORIDE 20 MEQ/15ML
40 SOLUTION ORAL ONCE
Status: COMPLETED | OUTPATIENT
Start: 2017-05-22 | End: 2017-05-22

## 2017-05-22 RX ORDER — METOPROLOL SUCCINATE 25 MG/1
25 TABLET, EXTENDED RELEASE ORAL DAILY
Status: DISCONTINUED | OUTPATIENT
Start: 2017-05-23 | End: 2017-05-22

## 2017-05-22 RX ADMIN — HEPARIN SODIUM AND DEXTROSE 20 UNITS/KG/HR: 10000; 5 INJECTION INTRAVENOUS at 10:05

## 2017-05-22 RX ADMIN — BUDESONIDE 0.5 MG: 0.5 SUSPENSION RESPIRATORY (INHALATION) at 07:05

## 2017-05-22 RX ADMIN — ARFORMOTEROL TARTRATE 15 MCG: 15 SOLUTION RESPIRATORY (INHALATION) at 07:05

## 2017-05-22 RX ADMIN — IPRATROPIUM BROMIDE AND ALBUTEROL SULFATE 3 ML: .5; 3 SOLUTION RESPIRATORY (INHALATION) at 07:05

## 2017-05-22 RX ADMIN — LACTULOSE 15 G: 10 SOLUTION ORAL at 05:05

## 2017-05-22 RX ADMIN — PANTOPRAZOLE SODIUM 40 MG: 40 INJECTION, POWDER, FOR SOLUTION INTRAVENOUS at 08:05

## 2017-05-22 RX ADMIN — CARVEDILOL 3.12 MG: 3.12 TABLET, FILM COATED ORAL at 08:05

## 2017-05-22 RX ADMIN — ASPIRIN 81 MG: 81 TABLET, COATED ORAL at 08:05

## 2017-05-22 RX ADMIN — POTASSIUM CHLORIDE 40 MEQ: 20 SOLUTION ORAL at 08:05

## 2017-05-22 RX ADMIN — IPRATROPIUM BROMIDE AND ALBUTEROL SULFATE 3 ML: .5; 3 SOLUTION RESPIRATORY (INHALATION) at 12:05

## 2017-05-22 RX ADMIN — LACTULOSE 15 G: 10 SOLUTION ORAL at 01:05

## 2017-05-22 RX ADMIN — LACTULOSE 15 G: 10 SOLUTION ORAL at 09:05

## 2017-05-22 RX ADMIN — THIAMINE HYDROCHLORIDE 500 MG: 100 INJECTION, SOLUTION INTRAMUSCULAR; INTRAVENOUS at 05:05

## 2017-05-22 NOTE — CONSULTS
NEURO CRITICAL CARE CONSULT NOTE    Jeniffer Fernandez   57 y.o. female  Consult Requested By: Adin Sanchez MD  Reason for Consult: Change in mental status  DATE 5/22/2017        History of Present Illness:   This is a 56 yo WF with medical issues who was admitted for COPD exacerbation.  Patient has received Precedex, haldol, seroquel and ativan that may have some contribution to her encephalopathy. Patient was seen by Dr. Grossman- Neurologist and EEG was done and per Dr. Grossman there were no seizures.     Neurology was consulted for second opinion.   Patient seen at the bedside, she is hypersomnolence but can be aroused, she follows basic commands. She participates in the exam, she is mildly agitated  Most likely from restraints.   She has been receiving ceftriaxone.     No family members at the bedside.             Past Medical History:   Diagnosis Date    Anxiety     COPD (chronic obstructive pulmonary disease)     Depression     Respiratory distress     SOB (shortness of breath)      Past Surgical History:   Procedure Laterality Date    COLONOSCOPY      TUBAL LIGATION       Family History   Problem Relation Age of Onset    COPD Mother     Cancer Father      brain    Aneurysm Brother     Suicide Son     No Known Problems Son     No Known Problems Son      Social History   Substance Use Topics    Smoking status: Current Every Day Smoker     Packs/day: 0.50     Years: 35.00     Types: Cigarettes    Smokeless tobacco: Not on file      Comment: decreasing amount of cigarettes    Alcohol use Yes      Comment: occassionally       Review of patient's allergies indicates:   Allergen Reactions    Codeine Itching    Codeine phosphate      Itchy (skin)^        Scheduled Meds:   albuterol-ipratropium 2.5mg-0.5mg/3mL  3 mL Nebulization Q6H    arformoterol  15 mcg Nebulization Q12H    aspirin  81 mg Oral Daily    budesonide  0.5 mg Nebulization Q12H    carvedilol  3.125 mg Oral BID    lactulose  15 g Oral  TID    pantoprazole  40 mg Intravenous Daily     Continuous Infusions:   sodium chloride 0.9% 50 mL/hr at 05/22/17 1300    heparin (porcine) in D5W 20 Units/kg/hr (05/22/17 1300)     PRN Meds:acetaminophen, albuterol-ipratropium 2.5mg-0.5mg/3mL, aluminum-magnesium hydroxide-simethicone, haloperidol lactate, heparin (PORCINE), heparin (PORCINE), ondansetron    Review of Systems:  All the 14 ROS were reviewed and the pertinent ones are mentioned in the HPI           OBJECTIVE:     Vital Signs (Most Recent)  Temp: 98.1 °F (36.7 °C) (05/22/17 1103)  Pulse: (!) 113 (05/22/17 1300)  Resp: 20 (05/22/17 1300)  BP: 110/84 (05/22/17 1300)  SpO2: 97 % (05/22/17 1300)     Vital Signs Range (Last 24H):  Temp:  [98.1 °F (36.7 °C)-98.5 °F (36.9 °C)]   Pulse:  []   Resp:  [14-25]   BP: (103-138)/()   SpO2:  [94 %-99 %]     Physical Exam:  General:  She is hypersomnolence but can be aroused  HEENT:   Acephalic, Atraumatic,  EOMI, PERRL, saccades and pursuits are slow and abnormal, no nystagmus, no ptosis, no lid lag, no neglect, no gaze palsy      Neck: supple, no JVD, no Carotid bruit, no torticollis,   Lungs: CTA,  No rhonchi, no rales  Heart: Regular Rate and rhythm, no murmurs, rubs and or gallops  Abdomen, Soft, non tender, non distended, no abdominal  bruit, bowel sounds present  Extremities: No edema,   Skin: No rash, no ecchymoses,         NEURO    Mental Status:    Patient is hypersomnolence but can be aroused  Memory, Recent and Remote: poor  Mood: Anxious  Affect: Flat  Behavior: no disinhibition noticed  Attention and Concentration: makes great effort to participate in the exam and stay focused  Insight and thought processes: delayed  Policomental : negative  Palmomental: negative  Frontal release sign: negative   Higher Executive functions: compromised  Visual spatial:deferred  Hallucinations, Delusions and suicidal ideation: unable to obtain         SPEECH:   No aphasia, no Dysarthria,     CRANIAL  NERVES:      II: visual acuity  Intact   II: visual fields Full to confrontation   II: pupils Equal, round, reactive to light   III,VII: ptosis None   III,IV,VI: extraocular muscles  Full ROM   V: mastication Normal   V: facial light touch sensation  Normal   V,VII: corneal reflex  Present   VII: facial muscle function - upper  Normal   VII: facial muscle function - lower Normal   VIII: hearing Not tested   IX: soft palate elevation  Normal   IX,X: gag reflex Present   XI: trapezius strength  Intact   XI: sternocleidomastoid strength Intact   XI: neck flexion strength  Intact   XII: tongue strength  Normal         MOTOR:Upper Extremities  And Lower extremities   Able to move all the extremities antigravity.  Gets fatigued easily    TONE:   Decreased throughout    MUSCLE MASS: wassting  REFLEXES: Deep tendon reflexes tested:  Upper extremities:               biceps (C5, C6):2               brachioradialis (C5, C6):2               triceps (C6, C7),0     Lower extremities:                knee or patellar (L2, 3, 4):1               ankle (S1, S2):0      Plantar responses: flexors b/l  Clonus: negative  Muscle Fasciculations: none    SENSORY  PIN PRICK and TEMP: withdraws to pain in all the extremities and facial  Soft TOUCH: intact  VIBRATION: intact  GRAPHESTHESIA:  deferred          CEREBELLAR  No rebound Phenomenon  No Nystagmus  No scanning speech      ROMBERG and  GAIT: deferred    EXTRAPYRAMIDALS:  akathisia               Laboratory:  Lab Results   Component Value Date    WBC 8.33 05/21/2017    HGB 11.3 (L) 05/21/2017    HCT 35.1 (L) 05/21/2017     05/21/2017     (H) 05/21/2017    AST 45 (H) 05/21/2017     05/22/2017    K 3.6 05/22/2017     05/22/2017    CREATININE 0.6 05/22/2017    BUN 11 05/22/2017    CO2 29 05/22/2017    INR 1.0 05/20/2017      No results for input(s): COLORU, CLARITYU, SPECGRAV, PHUR, PROTEINUA, GLUCOSEU, BLOODU, WBCU, RBCU, BACTERIA, MUCUS in the last 24  hours.    Invalid input(s):  BILIRUBINCON      Diagnostic Results:CT scan films   ( All images reviewed Independently)   Imaging Results          X-Ray Chest 1 View (Final result)  Result time 05/22/17 08:09:28    Final result by Edison Olivier MD (05/22/17 08:09:28)                 Impression:     As above.            Electronically signed by: EDISON OLIVIER MD  Date:     05/22/17  Time:    08:09              Narrative:    Exam: XR CHEST 1 VIEW    Clinical History: Shortness of breath. SOB    Findings:     Spell and fainting interstitial infiltrate in the right upper lobe.  Reticular interstitial opacities appear increased in the periphery of the lower lobes. The cardiac silhouette is within normal limits.  No pneumothorax.  NG tube in the stomach.  Emphysema.                             X-Ray Chest 1 View (Final result)  Result time 05/21/17 08:03:02    Final result by Eric Salomon MD (05/21/17 08:03:02)                 Impression:     See above.      Electronically signed by: ERIC SALOMON  Date:     05/21/17  Time:    08:03              Narrative:    Chest x-ray single view    Indication: Shortness of breath.    Findings: Comparison study 5/20/2017.  No change.                             US Abdomen Limited (Final result)  Result time 05/20/17 12:54:42    Final result by Hussein Simon MD (05/20/17 12:54:42)                 Impression:           1.  1.7 cm right hepatic lobe cyst.  2.  Mild gallbladder wall thickening, without gallstones, pericholecystic fluid or sonographic Newsome sign.  Chronic cholecystitis could cause this appearance.  3.  Otherwise, normal study.      Electronically signed by: HUSSEIN SIMON MD  Date:     05/20/17  Time:    12:54              Narrative:    Right quadrant ultrasound, color-flow and spectral doppler interrogation    History:    Acute encephalopathy with elevated Ammonia.  Suspect fibrosis/cirrhosis but no ascites..  Abnormal results of liver function studies.    Findings:   No  comparison studies are available.  There is a 1.7 cm right hepatic lobe cyst. The liver is otherwise normal, and measures 13.5 cm. The gallbladder is normal.  Mild gallbladder wall thickening. Negative for sonographic Newsome's sign.  The common duct measures 4 mm. The right kidney measures 10.5 cm.  It is without focal solid or cystic masses, hydronephrosis, perinephric fluid collections or shadowing stones. The visualized portions of the pancreas are normal.  The aorta and IVC are normal in caliber.  Hepatopedal flow is documented in the portal vein.  Negative for ascites.    Hepatorenal index is not recorded.                             X-Ray Chest 1 View (Final result)  Result time 05/20/17 09:15:27    Final result by David Elliott Jr., MD (05/20/17 09:15:27)                 Impression:     No significant interval change.      Electronically signed by: DAVID ELLIOTT  Date:     05/20/17  Time:    09:15              Narrative:    Exam: Portable chest radiograph    History:    Shortness of breath.    Comparison: 5/19/2017    Findings: Lungs are clear with prominent interstitium similar as before.  Enteric tube is in the stomach.  Cardiac silhouette and mediastinum within normal limits.  No effusion or pneumothorax.                             X-Ray Chest 1 View (Final result)  Result time 05/19/17 20:37:23    Final result by CHILANGO Rodriguez Sr., MD (05/19/17 20:37:23)                 Impression:        1.  There is a mild amount of interstitial opacities seen in both lungs with Kerley B-lines visualized bilaterally.  This is characteristic of pulmonary edema.  2.  An NG tube remains in place.    3.  There is surgical hardware in the proximal portion of the left humerus.        Electronically signed by: CHILANGO RODRIGUEZ MD  Date:     05/19/17  Time:    20:37              Narrative:    One-view chest x-ray    Clinical History: Hypoxia    Finding: Comparison was made to a prior examination performed on 5/18/2017.   An NG tube remains in place.  The size of the heart is normal.  There is a mild amount of interstitial opacities seen in both lungs with Kerley B-lines visualized bilaterally.  There is no pneumothorax.  The costophrenic angles are sharp.  There is surgical hardware in the proximal portion of the left humerus.                             X-Ray Chest 1 View (Final result)  Result time 05/18/17 11:49:18    Final result by James Caruso MD (05/18/17 11:49:18)                 Impression:      Please see above.      Electronically signed by: JAMES CARUSO MD  Date:     05/18/17  Time:    11:49              Narrative:    Exam: XR CHEST 1 VIEW,    Date:  05/18/17 11:04:25    History: NG tube placement    Comparison:  Earlier film from the same day.    Findings: Nasogastric tube has been placed with the distal tip well below the GE junction in good position.  Otherwise no change.                             CT Head Without Contrast (Final result)  Result time 05/18/17 10:46:24    Final result by James Caruso MD (05/18/17 10:46:24)                 Impression:         Negative noncontrast CT scan of the brain.         All CT scans at this facility use dose modulation, iterative reconstruction, and/or weight based dosing when appropriate to reduce radiation dose to as low as reasonably achievable.       Electronically signed by: JAMES CARUSO MD  Date:     05/18/17  Time:    10:46              Narrative:    CT HEAD WITHOUT CONTRAST    Date: 05/18/17 10:36:40    Clinical indication: Altered mental status.  Nonresponsive patient.     Technique:  Standard noncontrast CT scan of the brain. No previous for comparison.     Findings:  The ventricles are nondilated. Gray and white matter structures reveal normal attenuation. No hemorrhage, mass effect or edema is identified.     The skull and skull base are unremarkable.                             X-Ray Chest 1 View (Final result)  Result time 05/18/17 08:02:46     Final result by James Caruso MD (05/18/17 08:02:46)                 Impression:      Please see above.      Electronically signed by: JAMES CARUSO MD  Date:     05/18/17  Time:    08:02              Narrative:    Exam: XR CHEST 1 VIEW,    Date:  05/18/17 05:34:00    History: SOB    Comparison:  05/14/2017.    Findings: Heart size is normal.  No pneumothorax.  Reticulonodular lung disease is slightly improved.  Postoperative changes left humeral neck.                             CTA Chest Non-Coronary (Final result)  Result time 05/14/17 10:56:46    Final result by Dustin Caal MD (05/14/17 10:56:46)                 Impression:      Negative for pulmonary embolus.    Patchy right upper lobe and right lower lobe interstitial infiltrate/pneumonia with some nodularity at the dependent right lower lobe.  No pleural effusion.  Short term imaging followup after appropriate treatment is recommended.    Mediastinal adenopathy, perhaps reactive.  Attention on followup.    Emphysema.    Moderate stenosis proximal celiac artery.          All CT scans at this facility use dose modulation, iterative reconstruction, and/or weight based dosing when appropriate to reduce radiation dose to as low as reasonably achievable.      Electronically signed by: DUSTIN CAAL MD  Date:     05/14/17  Time:    10:56              Narrative:    EXAM: CTA CHEST NON CORONARY    CLINICAL HISTORY: shortness of breath    TECHNIQUE: CT angiogram performed of the chest following IV contrast administration to evaluate for pulmonary emboli. Multiplanar MIP reformations performed.    COMPARISON STUDIES: Chest x-ray May 14, 2017    FINDINGS:  No evidence of pulmonary embolus.  Minimal aortic atherosclerosis without aneurysm or dissection.    Marked emphysematous changes in the lungs with apical and paraseptal bullous changes.  Patchy interstitial infiltrate posterior right upper lobe and posterior right lower lobe.  Some areas of nodularity  in the dependent right lung base within this area of interstitial thickening with largest area of nodularity measuring 1.9 cm.    Numerous enlarged mediastinal lymph nodes are noted with a representative subcarinal lymph node measuring 1.4 x 1.3 cm.    Right hepatic lobe cyst.  Moderate narrowing proximal segment celiac artery.                             X-Ray Chest 1 View (Final result)  Result time 05/14/17 09:11:48    Final result by David Caal MD (05/14/17 09:11:48)                 Impression:      Diffuse chronic interstitial prominence with improving right upper lobe infiltrate with a small amount of residual right upper lobe interstitial markings remaining from prior chest x-ray.  Continued short-term followup recommended.      Electronically signed by: DAVID CAAL MD  Date:     05/14/17  Time:    09:11              Narrative:    EXAM: XR CHEST 1 VIEW    CLINICAL HISTORY: Shortness of breath.    COMPARISON STUDIES: December 22, 2016    FINDINGS:  The cardiomediastinal silhouette is within normal limits.  Diffuse mild interstitial prominence throughout the lungs with minimal asymmetric markings in the right upper lobe, though improved from prior exam.  Surgical plate proximal left humerus.                              05/22/2017    Assessment and Recommendations:  Patient with COPD exacerbation, had acute change in mental status.    She has been on multiple CNS modulating medications ( haldol, seroquel, steroids, ativan, librium, etc)          Differential diagnosis:  1) Toxic Encephalopathy  2) Metabolic Encephalopathy  3) Hypoxia secondary to COPD exacerbation  4) GINO  ( Ceftriaxone, induced reversible Encephalopathy)    Recommendations    1) Thiamine   2) Decrease medications that can cause sedation  3) Thyroid panel  4) If patient does not improve, will need repeat imaging and or  LP.       Thank you for the consult         Juan Daniel Duff MD., Ph.D., MS

## 2017-05-22 NOTE — PROGRESS NOTES
[facility] Hospital Medicine  Progress Note    Patient Name: Jeniffer Fernandez  MRN: [id number]  Patient Class: IP- Inpatient   Admission Date: 5/14/2017  Length of Stay: 8 days  Attending Physician: Adin Sanchez MD  Primary Care Provider: Ben Pelaez Jr, MD    Subjective:     Principal Problem:Shock circulatory    HPI:  No notes on file    Hospital Course:  Very agitated late morning 16 May and received Haldol and Ativan and slept most of the day.  She was better the following morning but became more confused and agitated in the evening and combative with staff.  Increased wheezing and pulling her oxygen off with desaturations.  When stable her blood gas showed adequate oxygenation and a pCO2 only slightly high at 50.  Transferred to ICU for respiratory failure. Was started on Precedex that has now been discontinued.    5/18 - Continued to have intermittent periods of agitation. Negative head CT and Neurology consult placed.    5/19 - Evening agitation.    5/20 - Started heparin drip and aspirin per Cardiology recommendations.  Echo shows HF with reduced ejection fraction of 30% and slightly elevated troponin.    Interval History: No acute agitation overnight. Patient slightly drowsy in the morning today but did not appear to be in any acute distress.    Review of Systems   Unable to perform ROS: Mental status change     Objective:     Vital Signs (Most Recent):  Temp: 98.2 °F (36.8 °C) (05/22/17 0703)  Pulse: 100 (05/22/17 0855)  Resp: 17 (05/22/17 0855)  BP: 105/84 (05/22/17 0855)  SpO2: 98 % (05/22/17 0855) Vital Signs (24h Range):  Temp:  [98.2 °F (36.8 °C)-98.5 °F (36.9 °C)] 98.2 °F (36.8 °C)  Pulse:  [] 100  Resp:  [14-24] 17  SpO2:  [94 %-100 %] 98 %  BP: (105-138)/() 105/84     Weight: 49.7 kg (109 lb 9.1 oz)  Body mass index is 18.81 kg/m².    Intake/Output Summary (Last 24 hours) at 05/22/17 1035  Last data filed at 05/22/17 0900   Gross per 24 hour   Intake          2504.92  ml   Output             1010 ml   Net          1494.92 ml      Physical Exam   Constitutional: She appears well-developed and well-nourished. No distress.   HENT:   Head: Normocephalic and atraumatic.   Mouth/Throat: Oropharynx is clear and moist.   Eyes: Conjunctivae and EOM are normal. Pupils are equal, round, and reactive to light.   Neck: Neck supple. No JVD present. No thyromegaly present.   Cardiovascular: Normal rate and regular rhythm.  Exam reveals no gallop and no friction rub.    No murmur heard.  Pulmonary/Chest: She has wheezes. She has no rales.   Coarse crackles bilaterally.  Expiratory wheezes and generally reduced breath sounds.   Abdominal: Soft. Bowel sounds are normal. She exhibits no distension. There is no tenderness. There is no rebound and no guarding.   Musculoskeletal: Normal range of motion. She exhibits no edema or deformity.   Lymphadenopathy:     She has no cervical adenopathy.   Neurological: She has normal reflexes.   Somnolent   Skin: Skin is warm and dry. No rash noted.   Nursing note and vitals reviewed.    Significant Labs:   A1C: No results for input(s): HGBA1C in the last 4320 hours.  ABGs:   Recent Labs  Lab 05/22/17  0439   PH 7.427   PCO2 43.6   HCO3 28.8*   POCSATURATED 93*   BE 4     Bilirubin:   Recent Labs  Lab 05/15/17  0604 05/18/17  0442 05/19/17  0409 05/20/17  0400 05/21/17  0418   BILIDIR  --   --  0.1  --   --    BILITOT 0.2 0.3 0.3 0.3 0.3     Blood Culture: No results for input(s): LABBLOO in the last 48 hours.  BMP:   Recent Labs  Lab 05/22/17  0425   *      K 3.6      CO2 29   BUN 11   CREATININE 0.6   CALCIUM 7.9*   MG 1.9     CBC:   Recent Labs  Lab 05/21/17  0418   WBC 8.33   HGB 11.3*   HCT 35.1*        CMP:   Recent Labs  Lab 05/21/17  0418 05/22/17  0425    143   K 3.4* 3.6    108   CO2 28 29   * 114*   BUN 16 11   CREATININE 0.6 0.6   CALCIUM 7.9* 7.9*   PROT 4.9*  --    ALBUMIN 2.6*  --    BILITOT 0.3  --     ALKPHOS 91  --    AST 45*  --    *  --    ANIONGAP 7* 6*   EGFRNONAA >60 >60     Cardiac Markers: No results for input(s): CKMB, MYOGLOBIN, BNP, TROPISTAT in the last 48 hours.  Coagulation:   Recent Labs  Lab 05/22/17  0425   APTT 34.1*     Lactic Acid: No results for input(s): LACTATE in the last 48 hours.  Lipase: No results for input(s): LIPASE in the last 48 hours.  Lipid Panel: No results for input(s): CHOL, HDL, LDLCALC, TRIG, CHOLHDL in the last 48 hours.    Significant Imaging:   Imaging Results          X-Ray Chest 1 View (Final result)  Result time 05/22/17 08:09:28    Final result by Edison Olivier MD (05/22/17 08:09:28)                 Impression:     As above.            Electronically signed by: EDISON OLIVIER MD  Date:     05/22/17  Time:    08:09              Narrative:    Exam: XR CHEST 1 VIEW    Clinical History: Shortness of breath. SOB    Findings:     Spell and fainting interstitial infiltrate in the right upper lobe.  Reticular interstitial opacities appear increased in the periphery of the lower lobes. The cardiac silhouette is within normal limits.  No pneumothorax.  NG tube in the stomach.  Emphysema.                             X-Ray Chest 1 View (Final result)  Result time 05/21/17 08:03:02    Final result by Eric Salomon MD (05/21/17 08:03:02)                 Impression:     See above.      Electronically signed by: ERIC SALOMON  Date:     05/21/17  Time:    08:03              Narrative:    Chest x-ray single view    Indication: Shortness of breath.    Findings: Comparison study 5/20/2017.  No change.                             US Abdomen Limited (Final result)  Result time 05/20/17 12:54:42    Final result by Hussein Simon MD (05/20/17 12:54:42)                 Impression:           1.  1.7 cm right hepatic lobe cyst.  2.  Mild gallbladder wall thickening, without gallstones, pericholecystic fluid or sonographic Newsome sign.  Chronic cholecystitis could cause this  appearance.  3.  Otherwise, normal study.      Electronically signed by: LAKISHA RITCHIE MD  Date:     05/20/17  Time:    12:54              Narrative:    Right quadrant ultrasound, color-flow and spectral doppler interrogation    History:    Acute encephalopathy with elevated Ammonia.  Suspect fibrosis/cirrhosis but no ascites..  Abnormal results of liver function studies.    Findings:   No comparison studies are available.  There is a 1.7 cm right hepatic lobe cyst. The liver is otherwise normal, and measures 13.5 cm. The gallbladder is normal.  Mild gallbladder wall thickening. Negative for sonographic Newsome's sign.  The common duct measures 4 mm. The right kidney measures 10.5 cm.  It is without focal solid or cystic masses, hydronephrosis, perinephric fluid collections or shadowing stones. The visualized portions of the pancreas are normal.  The aorta and IVC are normal in caliber.  Hepatopedal flow is documented in the portal vein.  Negative for ascites.    Hepatorenal index is not recorded.                             X-Ray Chest 1 View (Final result)  Result time 05/20/17 09:15:27    Final result by David Elliott Jr., MD (05/20/17 09:15:27)                 Impression:     No significant interval change.      Electronically signed by: DAVID ELLIOTT  Date:     05/20/17  Time:    09:15              Narrative:    Exam: Portable chest radiograph    History:    Shortness of breath.    Comparison: 5/19/2017    Findings: Lungs are clear with prominent interstitium similar as before.  Enteric tube is in the stomach.  Cardiac silhouette and mediastinum within normal limits.  No effusion or pneumothorax.                             X-Ray Chest 1 View (Final result)  Result time 05/19/17 20:37:23    Final result by CHILANGO Rodriguez Sr., MD (05/19/17 20:37:23)                 Impression:        1.  There is a mild amount of interstitial opacities seen in both lungs with Kerley B-lines visualized bilaterally.  This  is characteristic of pulmonary edema.  2.  An NG tube remains in place.    3.  There is surgical hardware in the proximal portion of the left humerus.        Electronically signed by: CHILANGO RODRIGUEZ MD  Date:     05/19/17  Time:    20:37              Narrative:    One-view chest x-ray    Clinical History: Hypoxia    Finding: Comparison was made to a prior examination performed on 5/18/2017.  An NG tube remains in place.  The size of the heart is normal.  There is a mild amount of interstitial opacities seen in both lungs with Kerley B-lines visualized bilaterally.  There is no pneumothorax.  The costophrenic angles are sharp.  There is surgical hardware in the proximal portion of the left humerus.                             X-Ray Chest 1 View (Final result)  Result time 05/18/17 11:49:18    Final result by James Caruso MD (05/18/17 11:49:18)                 Impression:      Please see above.      Electronically signed by: JAMES CARUSO MD  Date:     05/18/17  Time:    11:49              Narrative:    Exam: XR CHEST 1 VIEW,    Date:  05/18/17 11:04:25    History: NG tube placement    Comparison:  Earlier film from the same day.    Findings: Nasogastric tube has been placed with the distal tip well below the GE junction in good position.  Otherwise no change.                             CT Head Without Contrast (Final result)  Result time 05/18/17 10:46:24    Final result by James Caruso MD (05/18/17 10:46:24)                 Impression:         Negative noncontrast CT scan of the brain.         All CT scans at this facility use dose modulation, iterative reconstruction, and/or weight based dosing when appropriate to reduce radiation dose to as low as reasonably achievable.       Electronically signed by: JAMES CARUSO MD  Date:     05/18/17  Time:    10:46              Narrative:    CT HEAD WITHOUT CONTRAST    Date: 05/18/17 10:36:40    Clinical indication: Altered mental status.  Nonresponsive  patient.     Technique:  Standard noncontrast CT scan of the brain. No previous for comparison.     Findings:  The ventricles are nondilated. Gray and white matter structures reveal normal attenuation. No hemorrhage, mass effect or edema is identified.     The skull and skull base are unremarkable.                             X-Ray Chest 1 View (Final result)  Result time 05/18/17 08:02:46    Final result by James Caruso MD (05/18/17 08:02:46)                 Impression:      Please see above.      Electronically signed by: JAMES CARUSO MD  Date:     05/18/17  Time:    08:02              Narrative:    Exam: XR CHEST 1 VIEW,    Date:  05/18/17 05:34:00    History: SOB    Comparison:  05/14/2017.    Findings: Heart size is normal.  No pneumothorax.  Reticulonodular lung disease is slightly improved.  Postoperative changes left humeral neck.                             CTA Chest Non-Coronary (Final result)  Result time 05/14/17 10:56:46    Final result by Dustin Caal MD (05/14/17 10:56:46)                 Impression:      Negative for pulmonary embolus.    Patchy right upper lobe and right lower lobe interstitial infiltrate/pneumonia with some nodularity at the dependent right lower lobe.  No pleural effusion.  Short term imaging followup after appropriate treatment is recommended.    Mediastinal adenopathy, perhaps reactive.  Attention on followup.    Emphysema.    Moderate stenosis proximal celiac artery.          All CT scans at this facility use dose modulation, iterative reconstruction, and/or weight based dosing when appropriate to reduce radiation dose to as low as reasonably achievable.      Electronically signed by: DUSTIN CAAL MD  Date:     05/14/17  Time:    10:56              Narrative:    EXAM: CTA CHEST NON CORONARY    CLINICAL HISTORY: shortness of breath    TECHNIQUE: CT angiogram performed of the chest following IV contrast administration to evaluate for pulmonary emboli. Multiplanar  MIP reformations performed.    COMPARISON STUDIES: Chest x-ray May 14, 2017    FINDINGS:  No evidence of pulmonary embolus.  Minimal aortic atherosclerosis without aneurysm or dissection.    Marked emphysematous changes in the lungs with apical and paraseptal bullous changes.  Patchy interstitial infiltrate posterior right upper lobe and posterior right lower lobe.  Some areas of nodularity in the dependent right lung base within this area of interstitial thickening with largest area of nodularity measuring 1.9 cm.    Numerous enlarged mediastinal lymph nodes are noted with a representative subcarinal lymph node measuring 1.4 x 1.3 cm.    Right hepatic lobe cyst.  Moderate narrowing proximal segment celiac artery.                             X-Ray Chest 1 View (Final result)  Result time 05/14/17 09:11:48    Final result by David Caal MD (05/14/17 09:11:48)                 Impression:      Diffuse chronic interstitial prominence with improving right upper lobe infiltrate with a small amount of residual right upper lobe interstitial markings remaining from prior chest x-ray.  Continued short-term followup recommended.      Electronically signed by: DAVID CAAL MD  Date:     05/14/17  Time:    09:11              Narrative:    EXAM: XR CHEST 1 VIEW    CLINICAL HISTORY: Shortness of breath.    COMPARISON STUDIES: December 22, 2016    FINDINGS:  The cardiomediastinal silhouette is within normal limits.  Diffuse mild interstitial prominence throughout the lungs with minimal asymmetric markings in the right upper lobe, though improved from prior exam.  Surgical plate proximal left humerus.                                Assessment/Plan:      Delirium due to another medical condition, acute, hyperactive    Her episodes of confusion and agitation are unclear. She has a history of depression and misuse of medication and alcohol. CT head is negative for an acute intracranial process.  Neurology evaluated.  EEG  unrevealing.  Ammonia level has normalized. No further episodes in the last day. Was started on Seroquel by Neurology. Psych evaluation today.        Acute on chronic respiratory failure with hypoxia    Multifactorial from COPD, heart failure, and pneumonia.  Supplemental oxygen to maintain sat > 92 % - wean as appropriate.  She has severe underlying COPD with most recent PFT in 2014 and has continued to smoke since then.  Pulmonary following.        Anxiety    Holding all home psychiatric medications.  Careful use of Benzodiazepines with severe COPD.  PRN Ativan for agitation.  Scheduled Seroquel.        COPD exacerbation    Supplemental oxygen  Xopenex  IV Corticosteroids held.        LFT elevation    LFT's were normal on admission.  Check daily LFTs.  Lactulose given yesterday and patient's ammonia level decreased but is up to 55 today.  Liver Ultrasound showed a hepatic cyst and a mildly thickened gall bladder. Check hepatitis panel.        Chronic systolic heart failure    Most likely present when she was admitted given her history and risk factors.  Echo showed ejection fraction of 30%.  Cardiology on consult with recommendations.  Optimize medical management.  Suspect ischemic heart disease and anticipate LHC early next week.        Tobacco abuse    Must quit smoking and alcohol.  Denies need for Nicotine patch        Bipolar disorder    Holding all psychiatric meds.  She endorsed a history of misuse of Alcohol and Latuda.   denies any formal diagnosis of Bipolar Disorder.          VTE Risk Mitigation         Ordered     Medium Risk of VTE  Once      05/14/17 1132     Place sequential compression device  Until discontinued      05/14/17 1132        Adin Sanchez MD  Department of Hospital Medicine   [facility]

## 2017-05-22 NOTE — PROGRESS NOTES
Progress Note  Critical Care    Admit Date: 5/14/2017   LOS: 8 days     Follow-up For: Resp Failure     SUBJECTIVE:   Change over last 24 hours: More alert today and camryn off NPPV this AM.      ROS:  Review of Systems   Unable to perform ROS: Mental acuity       Continuous Infusions:   sodium chloride 0.9% 50 mL/hr at 05/22/17 0700    heparin (porcine) in D5W 20 Units/kg/hr (05/22/17 0700)     Review of patient's allergies indicates:   Allergen Reactions    Codeine Itching    Codeine phosphate      Itchy (skin)^       OBJECTIVE:     Vital Signs (Most Recent)  Temp: 98.2 °F (36.8 °C) (05/22/17 0703)  Pulse: 104 (05/22/17 0759)  Resp: 18 (05/22/17 0759)  BP: 113/83 (05/22/17 0703)  SpO2: 95 % (05/22/17 0759)    Vital Signs Range (Last 24H):  Temp:  [98.2 °F (36.8 °C)-98.5 °F (36.9 °C)]   Pulse:  []   Resp:  [14-24]   BP: (100-138)/()   SpO2:  [94 %-100 %]     I & O (Last 24H):  Intake/Output Summary (Last 24 hours) at 05/22/17 0841  Last data filed at 05/22/17 0700   Gross per 24 hour   Intake          2324.92 ml   Output              755 ml   Net          1569.92 ml        Oxygen Concentration (%):  [30] 30    Physical Exam:  Physical Exam   Constitutional: Vital signs are normal. She appears lethargic. She appears to not be writhing in pain, not malnourished and not jaundiced. She appears healthy.  Non-toxic appearance. She has a sickly appearance. No distress. She is not intubated.   HENT:   Head: Normocephalic and atraumatic.   Mouth/Throat: Mucous membranes are dry.       Eyes: EOM and lids are normal. Pupils are equal, round, and reactive to light.   Neck: Neck supple. Carotid bruit is not present.   Cardiovascular: Normal rate and regular rhythm.    Pulses:       Radial pulses are 2+ on the right side, and 2+ on the left side.        Dorsalis pedis pulses are 1+ on the right side, and 1+ on the left side.   Pulmonary/Chest: Accessory muscle usage (mild) present. No tachypnea. She is not  intubated. No respiratory distress. She has decreased breath sounds.   Abdominal: Soft. Normal appearance and bowel sounds are normal. She exhibits no distension. There is no tenderness.   Genitourinary:   Genitourinary Comments: Rodriguez    Musculoskeletal: Normal range of motion.   bilat hand edema   Lymphadenopathy:     She has no cervical adenopathy.     She has no axillary adenopathy.   Neurological: She appears lethargic.   Eyes closed but nods head to questions slowly and follows one step commands.  Verbally responsive today but slow and slurred speech   Skin: Skin is warm and dry. Bruising (bilat hands and UEs) noted. She is not diaphoretic. No cyanosis.   Psychiatric: She exhibits disordered thought content, abnormal recent memory and abnormal remote memory. She has a flat affect.       Laboratory (Last 24H):  CMP:    Recent Labs  Lab 05/22/17  0425   CALCIUM 7.9*      K 3.6   CO2 29      BUN 11   CREATININE 0.6       Coagulation:   Recent Labs  Lab 05/20/17  0911  05/22/17  0425   INR 1.0  --   --    APTT 23.1  < > 34.1*   < > = values in this interval not displayed.  ABGs:   Recent Labs  Lab 05/22/17  0439   PH 7.427   PCO2 43.6   HCO3 28.8*   POCSATURATED 93*   BE 4     Microbiology Results (last 7 days)     Procedure Component Value Units Date/Time    Blood culture [[id number]] Collected:  05/14/17 0815    Order Status:  Completed Specimen:  Blood from Peripheral, Forearm, Left Updated:  05/19/17 2012     Blood Culture, Routine No growth after 5 days.    Blood culture [[id number]] Collected:  05/14/17 0800    Order Status:  Completed Specimen:  Blood from Peripheral, Forearm, Left Updated:  05/19/17 2012     Blood Culture, Routine No growth after 5 days.    Culture, Respiratory with Gram Stain [[id number]] Collected:  05/14/17 1640    Order Status:  Completed Specimen:  Respiratory from Sputum, Expectorated Updated:  05/16/17 1323     Respiratory Culture --     HAEMOPHILUS INFLUENZAE  Few  Beta  Lactamase negative  Normal respiratory markus also present       Gram Stain (Respiratory) <10 epithelial cells per low power field.     Gram Stain (Respiratory) Moderate WBC's     Gram Stain (Respiratory) No organisms seen          Chest X-Ray:         Film and report reviewed:  No significant change    ASSESSMENT/PLAN:     Problem   Acute On Chronic Respiratory Failure With Hypoxia   Delirium Due to Another Medical Condition, Acute, Hyperactive   Copd Exacerbation   Systolic heart failure with probable CAD   Depression   Tobacco Abuse   Shock Circulatory (Resolved)       PLAN:    1. Neuro: neuro monitoring off Precedex infusion and Librium.  Cont Seroquel.  Neuro following, no seizure on EEG.  D/C PRN Ativan.     2. Pulmonary: Encourage C&DB and OOB asap.  Cont Duonebs, Formoterol, Budesonide.  NPPV  Day #4 weaning to nocturnal only.  Avoiding steroids due to agitation episode.      3. Cardiac: Cardiac monitoring and Cards following.  Planning LHC once pulm status stable.  Cont Heparin infusion and Coreg per Cards.     4. Renal: Rodriguez in place, monitor I/Os      5. Infectious Disease: Follow fever curve     6. Hematology/Oncology: monitor for bleeding     7. Endocrine:  Monitor glucose stable at 114     8. Fluids/Electrolytes/Nutrition/GI: cont bolus TF and Lactulose.  Replace K+.   Abd US unremarkable except for mild GB wall thickening and hepatic lobe cyst     9. Musculoskeletal:  ROM     10. Pain Management: no issues      11. Discharge and Palliative Care: Attempting to avoid intubation.  LTAC vs home with home health.      Preventive Measures and Monitoring:   Stress Ulcer: PPI  Nutrition: Bolus TF  Glucose control: stable  Bowel prophylaxis: PRN Dulcolax and scheduled Lactulose  DVT prophylaxis: Heparin infusion/SCDs  Hx CAD on B-Blocker: Lopressor  Head of Bed/Reposition: Elevate HOB and turn Q1-2 hours    Early Mobility: OOB asap  NG Day: #4  Rodriguez Day: #4  Code Status: Full       Counseling/Consultation: I  have discussed the care of this patient in detail with multidisciplinary team during rounds, bedside nursing staff and Dr. Broussard and Dr. Sanchez    Critical Care Time greater than: 47 minutes    Critical care was time spent personally by me on the following activities: development of treatment plan with patient or surrogate and bedside caregivers, discussions with consultants, evaluation of patient's response to treatment, examination of patient, ordering and performing treatments and interventions, ordering and review of laboratory studies, ordering and review of radiographic studies, pulse oximetry, re-evaluation of patient's condition.  This critical care time did not overlap with that of any other provider or involve time of any procedures.    POLLY Olsen [facility]

## 2017-05-22 NOTE — ASSESSMENT & PLAN NOTE
Her episodes of confusion and agitation are unclear. She has a history of depression and misuse of medication and alcohol. CT head is negative for an acute intracranial process.  Neurology evaluated.  EEG unrevealing.  Ammonia level has normalized. No further episodes in the last day. Was started on Seroquel by Neurology. Psych evaluation today.

## 2017-05-22 NOTE — PHYSICIAN QUERY
PT Name: Jeniffer Fernandez  MR #: [id number]    Physician Query Form -Present on Admission (POA) Diagnosis Clarification     CDS/: Vivien Law               Contact information: [phone]    This form is a permanent document in the medical record.     Query Date: May 22, 2017    By submitting this query, we are merely seeking further clarification of documentation. Please utilize your independent clinical judgment when addressing the question(s) below.       The Medical record contains the following:    Diagnosis      Supporting Clinical Information   Location in Medical Record   Metabolic encephalopathy           She was better the following morning but became more confused and agitated in the evening and combative with  staff.      Last night she was agitated and needed extra sedative to calm her down. Morning nurse reported that for last 3 nights, she showed agitations , confusion and needed sedatives to calm her down.   Neuro progress note 5-19     Progress note 5-18              Neuro progress note 5-20         Doctor, Please specify Present On Admission (POA) status of ________________________.    [  ] Present on Admission   [  ] Not Present on Admission  [  ] Clinically undetermined

## 2017-05-22 NOTE — PLAN OF CARE
Problem: Patient Care Overview  Goal: Plan of Care Review  Outcome: Ongoing (interventions implemented as appropriate)  Off BiPAP since this morning.  Patient beginning to self diurese, UOP is the best it has been in many days.  Bolus feeds continue, patient still slightly lethargic, but mental status greatly improved.  Heparin gtt continues; recheck PTT therapeutic; will recheck in AM.  Multiple loose liquid BMs.  Flexi seal inserted as ordered.  Family updated on POC.  All questions answered.

## 2017-05-22 NOTE — PLAN OF CARE
Problem: Patient Care Overview  Goal: Plan of Care Review  Outcome: Ongoing (interventions implemented as appropriate)  Patient on BiPAP at this time. Mepilex in place, no redness or breakdown noted at this time. Patient tolerated treatment well.

## 2017-05-22 NOTE — SUBJECTIVE & OBJECTIVE
Interval History: No acute agitation overnight. Patient slightly drowsy in the morning today but did not appear to be in any acute distress.    Review of Systems   Unable to perform ROS: Mental status change     Objective:     Vital Signs (Most Recent):  Temp: 98.2 °F (36.8 °C) (05/22/17 0703)  Pulse: 100 (05/22/17 0855)  Resp: 17 (05/22/17 0855)  BP: 105/84 (05/22/17 0855)  SpO2: 98 % (05/22/17 0855) Vital Signs (24h Range):  Temp:  [98.2 °F (36.8 °C)-98.5 °F (36.9 °C)] 98.2 °F (36.8 °C)  Pulse:  [] 100  Resp:  [14-24] 17  SpO2:  [94 %-100 %] 98 %  BP: (105-138)/() 105/84     Weight: 49.7 kg (109 lb 9.1 oz)  Body mass index is 18.81 kg/m².    Intake/Output Summary (Last 24 hours) at 05/22/17 1035  Last data filed at 05/22/17 0900   Gross per 24 hour   Intake          2504.92 ml   Output             1010 ml   Net          1494.92 ml      Physical Exam   Constitutional: She appears well-developed and well-nourished. No distress.   HENT:   Head: Normocephalic and atraumatic.   Mouth/Throat: Oropharynx is clear and moist.   Eyes: Conjunctivae and EOM are normal. Pupils are equal, round, and reactive to light.   Neck: Neck supple. No JVD present. No thyromegaly present.   Cardiovascular: Normal rate and regular rhythm.  Exam reveals no gallop and no friction rub.    No murmur heard.  Pulmonary/Chest: She has wheezes. She has no rales.   Coarse crackles bilaterally.  Expiratory wheezes and generally reduced breath sounds.   Abdominal: Soft. Bowel sounds are normal. She exhibits no distension. There is no tenderness. There is no rebound and no guarding.   Musculoskeletal: Normal range of motion. She exhibits no edema or deformity.   Lymphadenopathy:     She has no cervical adenopathy.   Neurological: She has normal reflexes.   Somnolent   Skin: Skin is warm and dry. No rash noted.   Nursing note and vitals reviewed.    Significant Labs:   A1C: No results for input(s): HGBA1C in the last 4320 hours.  ABGs:    Recent Labs  Lab 05/22/17 0439   PH 7.427   PCO2 43.6   HCO3 28.8*   POCSATURATED 93*   BE 4     Bilirubin:   Recent Labs  Lab 05/15/17  0604 05/18/17  0442 05/19/17  0409 05/20/17  0400 05/21/17 0418   BILIDIR  --   --  0.1  --   --    BILITOT 0.2 0.3 0.3 0.3 0.3     Blood Culture: No results for input(s): LABBLOO in the last 48 hours.  BMP:   Recent Labs  Lab 05/22/17 0425   *      K 3.6      CO2 29   BUN 11   CREATININE 0.6   CALCIUM 7.9*   MG 1.9     CBC:   Recent Labs  Lab 05/21/17 0418   WBC 8.33   HGB 11.3*   HCT 35.1*        CMP:   Recent Labs  Lab 05/21/17 0418 05/22/17 0425    143   K 3.4* 3.6    108   CO2 28 29   * 114*   BUN 16 11   CREATININE 0.6 0.6   CALCIUM 7.9* 7.9*   PROT 4.9*  --    ALBUMIN 2.6*  --    BILITOT 0.3  --    ALKPHOS 91  --    AST 45*  --    *  --    ANIONGAP 7* 6*   EGFRNONAA >60 >60     Cardiac Markers: No results for input(s): CKMB, MYOGLOBIN, BNP, TROPISTAT in the last 48 hours.  Coagulation:   Recent Labs  Lab 05/22/17 0425   APTT 34.1*     Lactic Acid: No results for input(s): LACTATE in the last 48 hours.  Lipase: No results for input(s): LIPASE in the last 48 hours.  Lipid Panel: No results for input(s): CHOL, HDL, LDLCALC, TRIG, CHOLHDL in the last 48 hours.    Significant Imaging:   Imaging Results          X-Ray Chest 1 View (Final result)  Result time 05/22/17 08:09:28    Final result by Edison Shrestha MD (05/22/17 08:09:28)                 Impression:     As above.            Electronically signed by: EDISON SHRESTHA MD  Date:     05/22/17  Time:    08:09              Narrative:    Exam: XR CHEST 1 VIEW    Clinical History: Shortness of breath. SOB    Findings:     Spell and fainting interstitial infiltrate in the right upper lobe.  Reticular interstitial opacities appear increased in the periphery of the lower lobes. The cardiac silhouette is within normal limits.  No pneumothorax.  NG tube in the stomach.   Emphysema.                             X-Ray Chest 1 View (Final result)  Result time 05/21/17 08:03:02    Final result by Eric Salomon MD (05/21/17 08:03:02)                 Impression:     See above.      Electronically signed by: ERIC SALOMON  Date:     05/21/17  Time:    08:03              Narrative:    Chest x-ray single view    Indication: Shortness of breath.    Findings: Comparison study 5/20/2017.  No change.                             US Abdomen Limited (Final result)  Result time 05/20/17 12:54:42    Final result by Hussein Simon MD (05/20/17 12:54:42)                 Impression:           1.  1.7 cm right hepatic lobe cyst.  2.  Mild gallbladder wall thickening, without gallstones, pericholecystic fluid or sonographic Newsome sign.  Chronic cholecystitis could cause this appearance.  3.  Otherwise, normal study.      Electronically signed by: HUSSEIN SIMON MD  Date:     05/20/17  Time:    12:54              Narrative:    Right quadrant ultrasound, color-flow and spectral doppler interrogation    History:    Acute encephalopathy with elevated Ammonia.  Suspect fibrosis/cirrhosis but no ascites..  Abnormal results of liver function studies.    Findings:   No comparison studies are available.  There is a 1.7 cm right hepatic lobe cyst. The liver is otherwise normal, and measures 13.5 cm. The gallbladder is normal.  Mild gallbladder wall thickening. Negative for sonographic Newsome's sign.  The common duct measures 4 mm. The right kidney measures 10.5 cm.  It is without focal solid or cystic masses, hydronephrosis, perinephric fluid collections or shadowing stones. The visualized portions of the pancreas are normal.  The aorta and IVC are normal in caliber.  Hepatopedal flow is documented in the portal vein.  Negative for ascites.    Hepatorenal index is not recorded.                             X-Ray Chest 1 View (Final result)  Result time 05/20/17 09:15:27    Final result by Dustin De La Cruz Jr., MD  (05/20/17 09:15:27)                 Impression:     No significant interval change.      Electronically signed by: DAVID ELLIOTT  Date:     05/20/17  Time:    09:15              Narrative:    Exam: Portable chest radiograph    History:    Shortness of breath.    Comparison: 5/19/2017    Findings: Lungs are clear with prominent interstitium similar as before.  Enteric tube is in the stomach.  Cardiac silhouette and mediastinum within normal limits.  No effusion or pneumothorax.                             X-Ray Chest 1 View (Final result)  Result time 05/19/17 20:37:23    Final result by CHILANGO Rodriguez Sr., MD (05/19/17 20:37:23)                 Impression:        1.  There is a mild amount of interstitial opacities seen in both lungs with Kerley B-lines visualized bilaterally.  This is characteristic of pulmonary edema.  2.  An NG tube remains in place.    3.  There is surgical hardware in the proximal portion of the left humerus.        Electronically signed by: CHILANGO RODRIGUEZ MD  Date:     05/19/17  Time:    20:37              Narrative:    One-view chest x-ray    Clinical History: Hypoxia    Finding: Comparison was made to a prior examination performed on 5/18/2017.  An NG tube remains in place.  The size of the heart is normal.  There is a mild amount of interstitial opacities seen in both lungs with Kerley B-lines visualized bilaterally.  There is no pneumothorax.  The costophrenic angles are sharp.  There is surgical hardware in the proximal portion of the left humerus.                             X-Ray Chest 1 View (Final result)  Result time 05/18/17 11:49:18    Final result by Adam Caruso MD (05/18/17 11:49:18)                 Impression:      Please see above.      Electronically signed by: ADAM CARUSO MD  Date:     05/18/17  Time:    11:49              Narrative:    Exam: XR CHEST 1 VIEW,    Date:  05/18/17 11:04:25    History: NG tube placement    Comparison:  Earlier film from the same  day.    Findings: Nasogastric tube has been placed with the distal tip well below the GE junction in good position.  Otherwise no change.                             CT Head Without Contrast (Final result)  Result time 05/18/17 10:46:24    Final result by James Caruso MD (05/18/17 10:46:24)                 Impression:         Negative noncontrast CT scan of the brain.         All CT scans at this facility use dose modulation, iterative reconstruction, and/or weight based dosing when appropriate to reduce radiation dose to as low as reasonably achievable.       Electronically signed by: JAMES CARUSO MD  Date:     05/18/17  Time:    10:46              Narrative:    CT HEAD WITHOUT CONTRAST    Date: 05/18/17 10:36:40    Clinical indication: Altered mental status.  Nonresponsive patient.     Technique:  Standard noncontrast CT scan of the brain. No previous for comparison.     Findings:  The ventricles are nondilated. Gray and white matter structures reveal normal attenuation. No hemorrhage, mass effect or edema is identified.     The skull and skull base are unremarkable.                             X-Ray Chest 1 View (Final result)  Result time 05/18/17 08:02:46    Final result by James Caruso MD (05/18/17 08:02:46)                 Impression:      Please see above.      Electronically signed by: JAMES CARUSO MD  Date:     05/18/17  Time:    08:02              Narrative:    Exam: XR CHEST 1 VIEW,    Date:  05/18/17 05:34:00    History: SOB    Comparison:  05/14/2017.    Findings: Heart size is normal.  No pneumothorax.  Reticulonodular lung disease is slightly improved.  Postoperative changes left humeral neck.                             CTA Chest Non-Coronary (Final result)  Result time 05/14/17 10:56:46    Final result by Dustin Caal MD (05/14/17 10:56:46)                 Impression:      Negative for pulmonary embolus.    Patchy right upper lobe and right lower lobe interstitial  infiltrate/pneumonia with some nodularity at the dependent right lower lobe.  No pleural effusion.  Short term imaging followup after appropriate treatment is recommended.    Mediastinal adenopathy, perhaps reactive.  Attention on followup.    Emphysema.    Moderate stenosis proximal celiac artery.          All CT scans at this facility use dose modulation, iterative reconstruction, and/or weight based dosing when appropriate to reduce radiation dose to as low as reasonably achievable.      Electronically signed by: DAVID CAAL MD  Date:     05/14/17  Time:    10:56              Narrative:    EXAM: CTA CHEST NON CORONARY    CLINICAL HISTORY: shortness of breath    TECHNIQUE: CT angiogram performed of the chest following IV contrast administration to evaluate for pulmonary emboli. Multiplanar MIP reformations performed.    COMPARISON STUDIES: Chest x-ray May 14, 2017    FINDINGS:  No evidence of pulmonary embolus.  Minimal aortic atherosclerosis without aneurysm or dissection.    Marked emphysematous changes in the lungs with apical and paraseptal bullous changes.  Patchy interstitial infiltrate posterior right upper lobe and posterior right lower lobe.  Some areas of nodularity in the dependent right lung base within this area of interstitial thickening with largest area of nodularity measuring 1.9 cm.    Numerous enlarged mediastinal lymph nodes are noted with a representative subcarinal lymph node measuring 1.4 x 1.3 cm.    Right hepatic lobe cyst.  Moderate narrowing proximal segment celiac artery.                             X-Ray Chest 1 View (Final result)  Result time 05/14/17 09:11:48    Final result by David Caal MD (05/14/17 09:11:48)                 Impression:      Diffuse chronic interstitial prominence with improving right upper lobe infiltrate with a small amount of residual right upper lobe interstitial markings remaining from prior chest x-ray.  Continued short-term followup  recommended.      Electronically signed by: DAVID ODOM MD  Date:     05/14/17  Time:    09:11              Narrative:    EXAM: XR CHEST 1 VIEW    CLINICAL HISTORY: Shortness of breath.    COMPARISON STUDIES: December 22, 2016    FINDINGS:  The cardiomediastinal silhouette is within normal limits.  Diffuse mild interstitial prominence throughout the lungs with minimal asymmetric markings in the right upper lobe, though improved from prior exam.  Surgical plate proximal left humerus.

## 2017-05-22 NOTE — CONSULTS
[facility] -   Adult Nutrition  Consult Note    SUMMARY     Recommendations  Recommendation/Intervention: 1. Continue tubefeed as tolerated  Goals: Meet needs from nutrition support until tolerating oral diet  Nutrition Goal Status: progressing towards goal  Communication of RD Recs: discussed on rounds    Reason for Assessment  Reason for Assessment: nurse/nurse practitioner consult (recs for bolus feedings)  Diagnosis:  (Pneumonia)  Relevent Medical History: Anxiety, Depression, Bipolar, SOB, Respiratory Distress   Interdisciplinary Rounds: attended  General Information Comments:  5/18/17 - Patient developed AMS last night and was transfered to ICU.  Patient with reported drinking 2 glasses of wine each night, volume unknown.  5/22/17 - Patient on bipap with bolus tubefeed for nutrition support due to continued AMS. Discussed RD recommendations this AM. Continue Nutren 1.5 bolus 250ml every 4 hours from 8am to 8pm for total volume of 1000ml per 24 hours.    Nutrition Discharge Planning: Too soon to determine    Nutrition Prescription Ordered  Current Diet Order: NPO  Current Nutrition Support Formula Ordered:  (Nutren 1.5)  Current Nutrition Support Rate Ordered: 250 (ml)  Current Nutrition Support Frequency Ordered: every 4 hours only while awake (1000ml total per 24 hours)    Evaluation of Received Nutrients/Fluid Intake  Enteral Calories (kcal): 1500  Enteral Protein (gm): 60  Enteral (Free Water) Fluid (mL): 776     Nutrition Risk Screen  Nutrition Risk Screen: no indicators present    Nutrition/Diet History  Food Preferences: unable to obtain  Meal/Snack Patterns: unable to obtain    Labs/Tests/Procedures/Meds  Pertinent Labs Reviewed: reviewed  Pertinent Labs Comments: Gluc 114, Alb 2.6, AST 45,   Pertinent Medications Reviewed: reviewed    Physical Findings  Overall Physical Appearance:  (thin)  Tubes: nasogastric tube (placed 5/18)  Oral/Mouth Cavity: tooh/teeth broken, tooth/teeth  missing  Skin:  (Haider 13)    Anthropometrics  Height (inches): 64.02 in  Weight Method: Bed Scale  Weight (kg): 49.7 kg (8.9L positive fluid balance per I/O)  Ideal Body Weight (IBW), Female: 120.1 lb  % Ideal Body Weight, Female (lb): 91.23 lb  BMI (kg/m2): 18.8  BMI Grade: 18.5-24.9 - normal    Estimated/Assessed Needs  Weight Used For Calorie Calculations: 46 kg (101 lb 6.6 oz)   Height (cm): 162.6 cm  Energy Need Method: Kcal/kg (35-40)  35 kcal/kg (kcal): 1610 and 40 kcal/kg (kcal): 1840   Weight Used For Protein Calculations: 46 kg (101 lb 6.6 oz)  0.8 gm Protein (gm): 36.88 and 1.0 gm Protein (gm): 46.1  Fluid Need Method: RDA Method (1ml/tito or as needed)    Assessment and Plan  Delirium due to another medical condition, acute, hyperactive    Nutrition Problem:   Other: Inadequate oral food and beverage intake    Etiology/Related to:   AMS    As evidenced by:  NPO due to acute AMS    Treatment Strategy:   1. Enteral nutrition support    Nutrition Diagnosis Status:   Continues (patient needs being met from enteral nutrition support)          Monitor and Evaluation  Food and Nutrient Intake: energy intake  Food and Nutrient Adminstration: diet order, enteral and parenteral nutrition administration  Physical Activity and Function: nutrition-related ADLs and IADLs  Anthropometric Measurements: weight  Biochemical Data, Medical Tests and Procedures: electrolyte and renal panel, glucose/endocrine profile  Nutrition-Focused Physical Findings: overall appearance, skin  % Intake of Estimated Energy Needs: 75 - 100 % from nutrition support  % Meal Intake: NPO    Nutrition Follow-Up  RD Follow-up?: Yes (2x weekly)

## 2017-05-22 NOTE — PLAN OF CARE
Problem: Patient Care Overview  Goal: Plan of Care Review  Patient calm, cooperative, following commands. Open eyes and makes eye contact. Educated patient on the importance of turning every 2 hours, pt attempts to assist with turns. Pt continued on Heparin gtts, adjustments made according to lab values and nomogram. VSS throughout the entire shift. Will continue to monitor pt

## 2017-05-22 NOTE — PROGRESS NOTES
Patient restless, HR 130s, BP elevated, O2 sats 92-94% on 2L NC.  Place patient back on BiPAP. ANNIE Garcia notified.  Will continue to monitor patient.

## 2017-05-22 NOTE — PHYSICIAN QUERY
PT Name: Jeniffer Fernandez  MR #: [id number]    Physician Query Form -Present on Admission (POA) Diagnosis Clarification     CDS/: Vivien Law               Contact information: [phone]    This form is a permanent document in the medical record.     Query Date: May 22, 2017    By submitting this query, we are merely seeking further clarification of documentation. Please utilize your independent clinical judgment when addressing the question(s) below.       The Medical record contains the following:    Diagnosis      Supporting Clinical Information   Location in Medical Record   Metabolic encephalopathy           She was better the following morning but became more confused and agitated in the evening and combative with  staff.      Last night she was agitated and needed extra sedative to calm her down. Morning nurse reported that for last 3 nights, she showed agitations , confusion and needed sedatives to calm her down.   Neuro progress note 5-19     Progress note 5-18              Neuro progress note 5-20         Doctor, Please specify Present On Admission (POA) status of ________________________.    [  ] Present on Admission   [  ] Not Present on Admission  [  ] Clinically undetermined

## 2017-05-22 NOTE — ASSESSMENT & PLAN NOTE
LFT's were normal on admission.  Check daily LFTs.  Lactulose given yesterday and patient's ammonia level decreased but is up to 55 today.  Liver Ultrasound showed a hepatic cyst and a mildly thickened gall bladder. Check hepatitis panel.

## 2017-05-22 NOTE — PLAN OF CARE
Problem: Patient Care Overview  Goal: Plan of Care Review  Outcome: Ongoing (interventions implemented as appropriate)  Recommendation/Intervention: 1. Continue tubefeed as tolerated  Goals: Meet needs from nutrition support until tolerating oral diet  Nutrition Goal Status: progressing towards goal  Communication of RD Recs: discussed on rounds

## 2017-05-22 NOTE — PROGRESS NOTES
Cardiology Progress Note        SUBJECTIVE:     History of Present Illness:  Patient is a 57 y.o. female presents with ACS, mildly elevated troponin and LVEF or 30 %. She has several episodes of shortness of breath on yesterday, requiring Bipap. Patient more awake today, however continues to lethargic and difficult to arouse. Blood pressure has been up and down,  as well.       OBJECTIVE:     Vital Signs (Most Recent)  Temp: 98.1 °F (36.7 °C) (05/22/17 1103)  Pulse: 107 (05/22/17 1103)  Resp: 20 (05/22/17 1103)  BP: 121/87 (05/22/17 1103)  SpO2: 97 % (05/22/17 1103)    Vital Signs Range (Last 24H):  Temp:  [98.1 °F (36.7 °C)-98.5 °F (36.9 °C)]   Pulse:  []   Resp:  [14-24]   BP: (103-138)/()   SpO2:  [94 %-100 %]     Intake/Output last 3 shifts:  I/O last 3 completed shifts:  In: 4198.5 [I.V.:2098.5; NG/GT:1600; IV Piggyback:500]  Out: 985 [Urine:985]    Intake/Output this shift:  I/O this shift:  In: 180 [NG/GT:180]  Out: 470 [Urine:220; Drains:250]    Review of patient's allergies indicates:   Allergen Reactions    Codeine Itching    Codeine phosphate      Itchy (skin)^       Current Facility-Administered Medications   Medication    0.9%  NaCl infusion    acetaminophen tablet 650 mg    albuterol-ipratropium 2.5mg-0.5mg/3mL nebulizer solution 3 mL    albuterol-ipratropium 2.5mg-0.5mg/3mL nebulizer solution 3 mL    aluminum-magnesium hydroxide-simethicone 200-200-20 mg/5 mL suspension 30 mL    arformoterol nebulizer solution 15 mcg    aspirin EC tablet 81 mg    budesonide nebulizer solution 0.5 mg    carvedilol tablet 3.125 mg    haloperidol lactate injection 5 mg    heparin 25,000 units in dextrose 5% 250 mL (100 units/mL) bolus from bag; PRN BOLUS    heparin 25,000 units in dextrose 5% 250 mL (100 units/mL) bolus from bag; PRN BOLUS    heparin 25,000 units in dextrose 5% 250 mL (100 units/mL) infusion; FEMALE    lactulose 20 gram/30 mL solution Soln 15 g    ondansetron injection 4 mg     pantoprazole injection 40 mg    quetiapine tablet 25 mg     No current facility-administered medications on file prior to encounter.      Current Outpatient Prescriptions on File Prior to Encounter   Medication Sig    amitriptyline (ELAVIL) 50 MG tablet Take 1 tablet (50 mg total) by mouth every evening. (Patient taking differently: Take 25 mg by mouth 3 (three) times daily. )    lorazepam (ATIVAN) 0.5 MG tablet Take 0.5 mg by mouth every 6 (six) hours as needed for Anxiety.    montelukast (SINGULAIR) 10 mg tablet Take 10 mg by mouth every evening.    paroxetine (PAXIL) 20 MG tablet Take 40 mg by mouth every morning.     predniSONE (DELTASONE) 10 MG tablet Please take 20mg twice daily x 3 days, then take 10 mg twice daily x 2 days, then take 10 mg once daily x 2 days    ropinirole (REQUIP) 1 MG tablet Take 1 mg by mouth every evening.    b complex vitamins tablet Take 1 tablet by mouth once daily.    docusate sodium (COLACE) 100 MG capsule Take 1 capsule (100 mg total) by mouth 2 (two) times daily.    fluticasone (FLONASE) 50 mcg/actuation nasal spray 2 sprays by Each Nare route once daily.    mometasone-formoterol (DULERA) 200-5 mcg/actuation inhaler Inhale 2 puffs into the lungs 2 (two) times daily.    nicotine (NICODERM CQ) 21 mg/24 hr Place 1 patch onto the skin once daily.       Physical Exam:  General appearance: lethergic, appears stated age and cooperative  Head: Normocephalic, without obvious abnormality, atraumatic  Eyes:  conjunctivae/corneas clear. PERRL, EOM's intact. Fundi benign.  Nose: no discharge  Throat: normal findings: tongue midline and normal  Neck: no adenopathy, no carotid bruit, no JVD, supple, symmetrical, trachea midline and thyroid not enlarged, symmetric, no tenderness/mass/nodules  Back:  no skin lesions, erythema, or scars  Lungs:  BBS with faint wheezing  Chest wall: no tenderness  Heart: regular rate and rhythm, S1, S2 normal, no murmur, click, rub or  gallop  Abdomen: soft, non-tender; bowel sounds normal; no masses,  no organomegaly  Extremities: extremities normal, atraumatic, no cyanosis or edema  Pulses: 1+ and symmetric  Skin: Skin color, texture, turgor normal. No rashes or lesions      Laboratory:  Chemistry:   Lab Results   Component Value Date     05/22/2017    K 3.6 05/22/2017     05/22/2017    CO2 29 05/22/2017    BUN 11 05/22/2017    CREATININE 0.6 05/22/2017    CALCIUM 7.9 (L) 05/22/2017     Cardiac Markers:   Lab Results   Component Value Date    TROPONINI 0.100 (H) 05/20/2017     Cardiac Markers (Last 3):   Lab Results   Component Value Date    TROPONINI 0.100 (H) 05/20/2017    TROPONINI 0.107 (H) 05/19/2017    TROPONINI 0.128 (H) 05/19/2017     CBC:   Lab Results   Component Value Date    WBC 8.33 05/21/2017    HGB 11.3 (L) 05/21/2017    HCT 35.1 (L) 05/21/2017     (H) 05/21/2017     05/21/2017     Lipids: No results found for: CHOL, TRIG, HDL, LDLDIRECT  Coagulation:   Lab Results   Component Value Date    INR 1.0 05/20/2017    APTT 34.1 (H) 05/22/2017       Diagnostic Results:  ECG: Reviewed  X-Ray: Reviewed  US: Reviewed  CT: Reviewed  Echo: Reviewed      ASSESSMENT/PLAN:     Patient Active Problem List   Diagnosis    Closed fracture of proximal end of left humerus    Fracture of proximal humerus    Pneumonia    Depression    Anxiety    Bipolar disorder    COPD, severe    Tobacco abuse    Abdominal pain    Lung infiltrate    COPD exacerbation    Acute on chronic respiratory failure with hypoxia    Delirium due to another medical condition, acute, hyperactive    LFT elevation    Chronic systolic heart failure        Plan:   Will continue current medications and treatment plan  Continue Coreg and Heparin drip  No ACE for now due to low blood pressure  Left Heart Cath when respiratory and encephalopathy improves       Chart reviewed. Dr. Chadwick agrees with plan as outlined above.

## 2017-05-23 PROBLEM — I50.22 CHRONIC SYSTOLIC HEART FAILURE: Chronic | Status: ACTIVE | Noted: 2017-05-20

## 2017-05-23 PROBLEM — R53.81 PHYSICAL DECONDITIONING: Status: ACTIVE | Noted: 2017-05-23

## 2017-05-23 LAB
ANION GAP SERPL CALC-SCNC: 5 MMOL/L
APTT BLDCRRT: 37.1 SEC
BASOPHILS # BLD AUTO: 0.01 K/UL
BASOPHILS NFR BLD: 0.1 %
BUN SERPL-MCNC: 8 MG/DL
CALCIUM SERPL-MCNC: 8.4 MG/DL
CHLORIDE SERPL-SCNC: 106 MMOL/L
CO2 SERPL-SCNC: 29 MMOL/L
CREAT SERPL-MCNC: 0.6 MG/DL
DIFFERENTIAL METHOD: ABNORMAL
EOSINOPHIL # BLD AUTO: 0.2 K/UL
EOSINOPHIL NFR BLD: 1.6 %
ERYTHROCYTE [DISTWIDTH] IN BLOOD BY AUTOMATED COUNT: 15.6 %
EST. GFR  (AFRICAN AMERICAN): >60 ML/MIN/1.73 M^2
EST. GFR  (NON AFRICAN AMERICAN): >60 ML/MIN/1.73 M^2
GLUCOSE SERPL-MCNC: 116 MG/DL
HCT VFR BLD AUTO: 36.4 %
HGB BLD-MCNC: 11.5 G/DL
LYMPHOCYTES # BLD AUTO: 1.7 K/UL
LYMPHOCYTES NFR BLD: 18.1 %
MAGNESIUM SERPL-MCNC: 2.1 MG/DL
MCH RBC QN AUTO: 32.1 PG
MCHC RBC AUTO-ENTMCNC: 31.6 %
MCV RBC AUTO: 102 FL
MONOCYTES # BLD AUTO: 1 K/UL
MONOCYTES NFR BLD: 10.5 %
NEUTROPHILS # BLD AUTO: 6.7 K/UL
NEUTROPHILS NFR BLD: 69.7 %
PLATELET # BLD AUTO: 173 K/UL
PMV BLD AUTO: 11.8 FL
POTASSIUM SERPL-SCNC: 4.2 MMOL/L
RBC # BLD AUTO: 3.58 M/UL
SODIUM SERPL-SCNC: 140 MMOL/L
WBC # BLD AUTO: 9.55 K/UL

## 2017-05-23 PROCEDURE — 25000242 PHARM REV CODE 250 ALT 637 W/ HCPCS: Performed by: NURSE PRACTITIONER

## 2017-05-23 PROCEDURE — 94640 AIRWAY INHALATION TREATMENT: CPT

## 2017-05-23 PROCEDURE — 25000003 PHARM REV CODE 250: Performed by: HOSPITALIST

## 2017-05-23 PROCEDURE — 63600175 PHARM REV CODE 636 W HCPCS: Performed by: INTERNAL MEDICINE

## 2017-05-23 PROCEDURE — 99232 SBSQ HOSP IP/OBS MODERATE 35: CPT | Mod: ,,, | Performed by: INTERNAL MEDICINE

## 2017-05-23 PROCEDURE — 25000003 PHARM REV CODE 250: Performed by: NURSE PRACTITIONER

## 2017-05-23 PROCEDURE — 99291 CRITICAL CARE FIRST HOUR: CPT | Mod: ,,, | Performed by: NURSE PRACTITIONER

## 2017-05-23 PROCEDURE — 36415 COLL VENOUS BLD VENIPUNCTURE: CPT

## 2017-05-23 PROCEDURE — G8980 MOBILITY D/C STATUS: HCPCS | Mod: CK

## 2017-05-23 PROCEDURE — G8978 MOBILITY CURRENT STATUS: HCPCS | Mod: CM

## 2017-05-23 PROCEDURE — C9113 INJ PANTOPRAZOLE SODIUM, VIA: HCPCS | Performed by: INTERNAL MEDICINE

## 2017-05-23 PROCEDURE — 63600175 PHARM REV CODE 636 W HCPCS: Performed by: HOSPITALIST

## 2017-05-23 PROCEDURE — 85730 THROMBOPLASTIN TIME PARTIAL: CPT

## 2017-05-23 PROCEDURE — 25000003 PHARM REV CODE 250: Performed by: INTERNAL MEDICINE

## 2017-05-23 PROCEDURE — G8979 MOBILITY GOAL STATUS: HCPCS | Mod: CL

## 2017-05-23 PROCEDURE — 31720 CLEARANCE OF AIRWAYS: CPT

## 2017-05-23 PROCEDURE — 83735 ASSAY OF MAGNESIUM: CPT

## 2017-05-23 PROCEDURE — 97530 THERAPEUTIC ACTIVITIES: CPT

## 2017-05-23 PROCEDURE — 92610 EVALUATE SWALLOWING FUNCTION: CPT

## 2017-05-23 PROCEDURE — 63600175 PHARM REV CODE 636 W HCPCS: Performed by: NURSE PRACTITIONER

## 2017-05-23 PROCEDURE — 85025 COMPLETE CBC W/AUTO DIFF WBC: CPT

## 2017-05-23 PROCEDURE — 97161 PT EVAL LOW COMPLEX 20 MIN: CPT

## 2017-05-23 PROCEDURE — 20000000 HC ICU ROOM

## 2017-05-23 PROCEDURE — 80048 BASIC METABOLIC PNL TOTAL CA: CPT

## 2017-05-23 RX ORDER — ENOXAPARIN SODIUM 100 MG/ML
40 INJECTION SUBCUTANEOUS EVERY 24 HOURS
Status: DISCONTINUED | OUTPATIENT
Start: 2017-05-23 | End: 2017-05-27 | Stop reason: HOSPADM

## 2017-05-23 RX ORDER — ATORVASTATIN CALCIUM 40 MG/1
40 TABLET, FILM COATED ORAL DAILY
Status: DISCONTINUED | OUTPATIENT
Start: 2017-05-23 | End: 2017-05-27 | Stop reason: HOSPADM

## 2017-05-23 RX ORDER — CLOPIDOGREL BISULFATE 75 MG/1
75 TABLET ORAL DAILY
Status: DISCONTINUED | OUTPATIENT
Start: 2017-05-23 | End: 2017-05-27 | Stop reason: HOSPADM

## 2017-05-23 RX ADMIN — LACTULOSE 15 G: 10 SOLUTION ORAL at 05:05

## 2017-05-23 RX ADMIN — ARFORMOTEROL TARTRATE 15 MCG: 15 SOLUTION RESPIRATORY (INHALATION) at 07:05

## 2017-05-23 RX ADMIN — CARVEDILOL 3.12 MG: 3.12 TABLET, FILM COATED ORAL at 10:05

## 2017-05-23 RX ADMIN — ATORVASTATIN CALCIUM 40 MG: 40 TABLET, FILM COATED ORAL at 01:05

## 2017-05-23 RX ADMIN — IPRATROPIUM BROMIDE AND ALBUTEROL SULFATE 3 ML: .5; 3 SOLUTION RESPIRATORY (INHALATION) at 07:05

## 2017-05-23 RX ADMIN — BUDESONIDE 0.5 MG: 0.5 SUSPENSION RESPIRATORY (INHALATION) at 07:05

## 2017-05-23 RX ADMIN — LACTULOSE 15 G: 10 SOLUTION ORAL at 10:05

## 2017-05-23 RX ADMIN — LACTULOSE 15 G: 10 SOLUTION ORAL at 01:05

## 2017-05-23 RX ADMIN — CARVEDILOL 3.12 MG: 3.12 TABLET, FILM COATED ORAL at 08:05

## 2017-05-23 RX ADMIN — PANTOPRAZOLE SODIUM 40 MG: 40 INJECTION, POWDER, FOR SOLUTION INTRAVENOUS at 08:05

## 2017-05-23 RX ADMIN — CLOPIDOGREL BISULFATE 75 MG: 75 TABLET ORAL at 01:05

## 2017-05-23 RX ADMIN — IPRATROPIUM BROMIDE AND ALBUTEROL SULFATE 3 ML: .5; 3 SOLUTION RESPIRATORY (INHALATION) at 12:05

## 2017-05-23 RX ADMIN — ENOXAPARIN SODIUM 40 MG: 100 INJECTION SUBCUTANEOUS at 05:05

## 2017-05-23 RX ADMIN — ASPIRIN 81 MG: 81 TABLET, COATED ORAL at 08:05

## 2017-05-23 NOTE — PLAN OF CARE
Problem: Patient Care Overview  Goal: Plan of Care Review  Outcome: Ongoing (interventions implemented as appropriate)  Plan of care review with patient and spouse, vital signs stable this shift, Afebrile, temp max  99. Sinus tach to sinus rhythm. Good urine outpout , tolerate BIPAP overnight,on Heparin drip. Turn reposition Q2hrs.

## 2017-05-23 NOTE — PROGRESS NOTES
NEURO- CRITICAL CARE PROGRESS NOTE    Jeniffer Fernandez   57 y.o. female  DATE 5/23/2017      Patient seen at the bedside, she is awake, alert and following commands. She is sitting comfortably in bed. She denies headaches, no chest pain, no sob, no abdominal pain, no nausea, no vomiting.        Past Medical History:   Diagnosis Date    Anxiety     COPD (chronic obstructive pulmonary disease)     Depression     Respiratory distress     SOB (shortness of breath)      Past Surgical History:   Procedure Laterality Date    COLONOSCOPY      TUBAL LIGATION       Family History   Problem Relation Age of Onset    COPD Mother     Cancer Father      brain    Aneurysm Brother     Suicide Son     No Known Problems Son     No Known Problems Son      Social History   Substance Use Topics    Smoking status: Current Every Day Smoker     Packs/day: 0.50     Years: 35.00     Types: Cigarettes    Smokeless tobacco: Not on file      Comment: decreasing amount of cigarettes    Alcohol use Yes      Comment: occassionally       Review of patient's allergies indicates:   Allergen Reactions    Codeine Itching    Codeine phosphate      Itchy (skin)^        Scheduled Meds:   albuterol-ipratropium 2.5mg-0.5mg/3mL  3 mL Nebulization Q6H    arformoterol  15 mcg Nebulization Q12H    aspirin  81 mg Oral Daily    atorvastatin  40 mg Oral Daily    budesonide  0.5 mg Nebulization Q12H    carvedilol  3.125 mg Oral BID    clopidogrel  75 mg Oral Daily    enoxaparin  40 mg Subcutaneous Daily    lactulose  15 g Oral TID    pantoprazole  40 mg Intravenous Daily     Continuous Infusions:   sodium chloride 0.9% 50 mL/hr at 05/23/17 1200     PRN Meds:acetaminophen, albuterol-ipratropium 2.5mg-0.5mg/3mL, aluminum-magnesium hydroxide-simethicone, haloperidol lactate, ondansetron    Review of Systems:  All the 14 ROS were reviewed and the pertinent ones are mentioned in the HPI           OBJECTIVE:     Vital Signs (Most  Recent)  Temp: 98.6 °F (37 °C) (05/23/17 1105)  Pulse: 107 (05/23/17 1234)  Resp: (!) 22 (05/23/17 1234)  BP: (!) 76/59 (Jose, NP aware) (05/23/17 1210)  SpO2: 97 % (05/23/17 1234)     Vital Signs Range (Last 24H):  Temp:  [98.2 °F (36.8 °C)-99 °F (37.2 °C)]   Pulse:  []   Resp:  [13-29]   BP: ()/()   SpO2:  [74 %-100 %]     Physical Exam:  General:  Patient is awake, alert and following  commands  HEENT:   Acephalic, Atraumatic,  EOMI, PERRLA, no nystagmus, no ptosis, no lid lag, no neglect, no gaze palsy      Neck: supple, no JVD, no Carotid bruit, no torticollis,   Lungs: CTA,  No rhonchi, no rales  Heart: Regular Rate and rhythm, no murmurs, rubs and or gallops  Abdomen, Soft, non tender, non distended, no abdominal  bruit, bowel sounds present  Extremities: No edema,   Skin: No rash, no ecchymoses,         NEURO    Mental Status:  Patient is awake, alert and following commands  Insight thought processes and higher executive functions are compromised  Attention and concentration is better today     SPEECH:   No aphasia, no Dysarthria,     CRANIAL NERVES:      II: visual acuity  Intact   II: visual fields Full to confrontation   II: pupils Equal, round, reactive to light   III,VII: ptosis None   III,IV,VI: extraocular muscles  Full ROM   V: mastication Normal   V: facial light touch sensation  Normal   V,VII: corneal reflex  Present   VII: facial muscle function - upper  Normal   VII: facial muscle function - lower Normal   VIII: hearing Intact   IX: soft palate elevation  Normal   IX,X: gag reflex Present   XI: trapezius strength  Intact   XI: sternocleidomastoid strength Intact   XI: neck flexion strength  Intact   XII: tongue strength  Normal         MOTOR:Upper Extremities and Lower Extremities:  Moves all the extremities antigravity       TONE:  normal    REFLEXES: Deep tendon reflexes tested:  Upper extremities:               biceps (C5, C6):2               brachioradialis (C5, C6):2                triceps (C6, C7),0     Lower extremities:                knee or patellar (L2, 3, 4): 1               ankle (S1, S2):0      Plantar responses:   Flexors      SENSORY  PIN PRICK and TEMP:  Intact to pin prick, temp and vibration    ROMBERG and  GAIT: deferred  EXTRAPYRAMIDALS: none      Laboratory:  Lab Results   Component Value Date    WBC 9.55 05/23/2017    HGB 11.5 (L) 05/23/2017    HCT 36.4 (L) 05/23/2017     05/23/2017     (H) 05/21/2017    AST 45 (H) 05/21/2017     05/23/2017    K 4.2 05/23/2017     05/23/2017    CREATININE 0.6 05/23/2017    BUN 8 05/23/2017    CO2 29 05/23/2017    INR 1.0 05/20/2017       Diagnostic Results:CT scan films   ( All images reviewed Independently)   Imaging Results          X-Ray Chest 1 View (Final result)  Result time 05/22/17 08:09:28    Final result by Edison Olivier MD (05/22/17 08:09:28)                 Impression:     As above.            Electronically signed by: EDISON OLIVIER MD  Date:     05/22/17  Time:    08:09              Narrative:    Exam: XR CHEST 1 VIEW    Clinical History: Shortness of breath. SOB    Findings:     Spell and fainting interstitial infiltrate in the right upper lobe.  Reticular interstitial opacities appear increased in the periphery of the lower lobes. The cardiac silhouette is within normal limits.  No pneumothorax.  NG tube in the stomach.  Emphysema.                             X-Ray Chest 1 View (Final result)  Result time 05/21/17 08:03:02    Final result by Eric Salomon MD (05/21/17 08:03:02)                 Impression:     See above.      Electronically signed by: ERIC SALOMON  Date:     05/21/17  Time:    08:03              Narrative:    Chest x-ray single view    Indication: Shortness of breath.    Findings: Comparison study 5/20/2017.  No change.                             US Abdomen Limited (Final result)  Result time 05/20/17 12:54:42    Final result by Hussein Simon MD (05/20/17 12:54:42)                  Impression:           1.  1.7 cm right hepatic lobe cyst.  2.  Mild gallbladder wall thickening, without gallstones, pericholecystic fluid or sonographic Newsome sign.  Chronic cholecystitis could cause this appearance.  3.  Otherwise, normal study.      Electronically signed by: LAKISHA RITCHIE MD  Date:     05/20/17  Time:    12:54              Narrative:    Right quadrant ultrasound, color-flow and spectral doppler interrogation    History:    Acute encephalopathy with elevated Ammonia.  Suspect fibrosis/cirrhosis but no ascites..  Abnormal results of liver function studies.    Findings:   No comparison studies are available.  There is a 1.7 cm right hepatic lobe cyst. The liver is otherwise normal, and measures 13.5 cm. The gallbladder is normal.  Mild gallbladder wall thickening. Negative for sonographic Newsome's sign.  The common duct measures 4 mm. The right kidney measures 10.5 cm.  It is without focal solid or cystic masses, hydronephrosis, perinephric fluid collections or shadowing stones. The visualized portions of the pancreas are normal.  The aorta and IVC are normal in caliber.  Hepatopedal flow is documented in the portal vein.  Negative for ascites.    Hepatorenal index is not recorded.                             X-Ray Chest 1 View (Final result)  Result time 05/20/17 09:15:27    Final result by David De La Cruz Jr., MD (05/20/17 09:15:27)                 Impression:     No significant interval change.      Electronically signed by: DAVID DE LA CRUZ  Date:     05/20/17  Time:    09:15              Narrative:    Exam: Portable chest radiograph    History:    Shortness of breath.    Comparison: 5/19/2017    Findings: Lungs are clear with prominent interstitium similar as before.  Enteric tube is in the stomach.  Cardiac silhouette and mediastinum within normal limits.  No effusion or pneumothorax.                             X-Ray Chest 1 View (Final result)  Result time 05/19/17 20:37:23     Final result by CHILANGO Rodriguez Sr., MD (05/19/17 20:37:23)                 Impression:        1.  There is a mild amount of interstitial opacities seen in both lungs with Kerley B-lines visualized bilaterally.  This is characteristic of pulmonary edema.  2.  An NG tube remains in place.    3.  There is surgical hardware in the proximal portion of the left humerus.        Electronically signed by: CHILANGO RODRIGUEZ MD  Date:     05/19/17  Time:    20:37              Narrative:    One-view chest x-ray    Clinical History: Hypoxia    Finding: Comparison was made to a prior examination performed on 5/18/2017.  An NG tube remains in place.  The size of the heart is normal.  There is a mild amount of interstitial opacities seen in both lungs with Kerley B-lines visualized bilaterally.  There is no pneumothorax.  The costophrenic angles are sharp.  There is surgical hardware in the proximal portion of the left humerus.                             X-Ray Chest 1 View (Final result)  Result time 05/18/17 11:49:18    Final result by James Caruso MD (05/18/17 11:49:18)                 Impression:      Please see above.      Electronically signed by: JAMES CARUSO MD  Date:     05/18/17  Time:    11:49              Narrative:    Exam: XR CHEST 1 VIEW,    Date:  05/18/17 11:04:25    History: NG tube placement    Comparison:  Earlier film from the same day.    Findings: Nasogastric tube has been placed with the distal tip well below the GE junction in good position.  Otherwise no change.                             CT Head Without Contrast (Final result)  Result time 05/18/17 10:46:24    Final result by James Caruso MD (05/18/17 10:46:24)                 Impression:         Negative noncontrast CT scan of the brain.         All CT scans at this facility use dose modulation, iterative reconstruction, and/or weight based dosing when appropriate to reduce radiation dose to as low as reasonably achievable.        Electronically signed by: JAMES PIKE MD  Date:     05/18/17  Time:    10:46              Narrative:    CT HEAD WITHOUT CONTRAST    Date: 05/18/17 10:36:40    Clinical indication: Altered mental status.  Nonresponsive patient.     Technique:  Standard noncontrast CT scan of the brain. No previous for comparison.     Findings:  The ventricles are nondilated. Gray and white matter structures reveal normal attenuation. No hemorrhage, mass effect or edema is identified.     The skull and skull base are unremarkable.                             X-Ray Chest 1 View (Final result)  Result time 05/18/17 08:02:46    Final result by James Pike MD (05/18/17 08:02:46)                 Impression:      Please see above.      Electronically signed by: JAMES PIKE MD  Date:     05/18/17  Time:    08:02              Narrative:    Exam: XR CHEST 1 VIEW,    Date:  05/18/17 05:34:00    History: SOB    Comparison:  05/14/2017.    Findings: Heart size is normal.  No pneumothorax.  Reticulonodular lung disease is slightly improved.  Postoperative changes left humeral neck.                             CTA Chest Non-Coronary (Final result)  Result time 05/14/17 10:56:46    Final result by Dustin Caal MD (05/14/17 10:56:46)                 Impression:      Negative for pulmonary embolus.    Patchy right upper lobe and right lower lobe interstitial infiltrate/pneumonia with some nodularity at the dependent right lower lobe.  No pleural effusion.  Short term imaging followup after appropriate treatment is recommended.    Mediastinal adenopathy, perhaps reactive.  Attention on followup.    Emphysema.    Moderate stenosis proximal celiac artery.          All CT scans at this facility use dose modulation, iterative reconstruction, and/or weight based dosing when appropriate to reduce radiation dose to as low as reasonably achievable.      Electronically signed by: DUSTIN CAAL MD  Date:     05/14/17  Time:    10:56               Narrative:    EXAM: CTA CHEST NON CORONARY    CLINICAL HISTORY: shortness of breath    TECHNIQUE: CT angiogram performed of the chest following IV contrast administration to evaluate for pulmonary emboli. Multiplanar MIP reformations performed.    COMPARISON STUDIES: Chest x-ray May 14, 2017    FINDINGS:  No evidence of pulmonary embolus.  Minimal aortic atherosclerosis without aneurysm or dissection.    Marked emphysematous changes in the lungs with apical and paraseptal bullous changes.  Patchy interstitial infiltrate posterior right upper lobe and posterior right lower lobe.  Some areas of nodularity in the dependent right lung base within this area of interstitial thickening with largest area of nodularity measuring 1.9 cm.    Numerous enlarged mediastinal lymph nodes are noted with a representative subcarinal lymph node measuring 1.4 x 1.3 cm.    Right hepatic lobe cyst.  Moderate narrowing proximal segment celiac artery.                             X-Ray Chest 1 View (Final result)  Result time 05/14/17 09:11:48    Final result by David Caal MD (05/14/17 09:11:48)                 Impression:      Diffuse chronic interstitial prominence with improving right upper lobe infiltrate with a small amount of residual right upper lobe interstitial markings remaining from prior chest x-ray.  Continued short-term followup recommended.      Electronically signed by: DAVID CAAL MD  Date:     05/14/17  Time:    09:11              Narrative:    EXAM: XR CHEST 1 VIEW    CLINICAL HISTORY: Shortness of breath.    COMPARISON STUDIES: December 22, 2016    FINDINGS:  The cardiomediastinal silhouette is within normal limits.  Diffuse mild interstitial prominence throughout the lungs with minimal asymmetric markings in the right upper lobe, though improved from prior exam.  Surgical plate proximal left humerus.                              05/23/2017    Assessment and Recommendations:    Patient with COPD  exacerbation, resulting in change in delirium. Head CT negative for any acute etiology. She has improved significantly.         Recommendations:  1) Medical management, avoid CNS modulating drugs  2) PT/OT  3) nutrition support      Will follow          Juan Daniel Duff MD., Ph.D., MS

## 2017-05-23 NOTE — PLAN OF CARE
Problem: Patient Care Overview  Goal: Plan of Care Review  Pt rested on Bipap all shift. Tolerated neb tx well.

## 2017-05-23 NOTE — PLAN OF CARE
Problem: Patient Care Overview  Goal: Plan of Care Review  Outcome: Ongoing (interventions implemented as appropriate)  Failed swallow eval today.  Remains up in chair since early in the shift.  Heparin D/C'd.  More alert, but falls asleep easily.  VSS.  Family updated on POC.  All questions answered.

## 2017-05-23 NOTE — PROGRESS NOTES
Progress Note  Critical Care    Admit Date: 5/14/2017   LOS: 9 days     Follow-up For: Resp Failure and Alt MS     SUBJECTIVE:   Change over last 24 hours: Much more alert today.  Sitting up in chair at bedside with eyes open and responsive.  Hilda off NPPV all during day yesterday    ROS:  Review of Systems   Unable to perform ROS: Mental acuity       Continuous Infusions:   sodium chloride 0.9% 50 mL/hr at 05/23/17 1100     Review of patient's allergies indicates:   Allergen Reactions    Codeine Itching    Codeine phosphate      Itchy (skin)^       OBJECTIVE:     Vital Signs (Most Recent)  Temp: 98.6 °F (37 °C) (05/23/17 1105)  Pulse: 102 (05/23/17 1105)  Resp: 20 (05/23/17 1105)  BP: (!) 127/104 (05/23/17 1105)  SpO2: 99 % (05/23/17 1105)    Vital Signs Range (Last 24H):  Temp:  [98.2 °F (36.8 °C)-99 °F (37.2 °C)]   Pulse:  []   Resp:  [13-29]   BP: ()/()   SpO2:  [74 %-100 %]     I & O (Last 24H):  Intake/Output Summary (Last 24 hours) at 05/23/17 1138  Last data filed at 05/23/17 1100   Gross per 24 hour   Intake           3052.8 ml   Output             2385 ml   Net            667.8 ml       Ventilator Data (Last 24H):     Oxygen Concentration (%):  [3-30] 3    Physical Exam:  Physical Exam   Constitutional: Vital signs are normal. She appears to not be writhing in pain, not malnourished and not jaundiced. Lethargic: but much improved. She appears unhealthy.  Non-toxic appearance. No distress. She is not intubated.   HENT:   Head: Normocephalic and atraumatic.   Mouth/Throat: Mucous membranes are dry.       Eyes: EOM and lids are normal. Pupils are equal, round, and reactive to light.   Neck: Normal range of motion. Carotid bruit is not present. No tracheal deviation present.   Cardiovascular: Normal rate and regular rhythm.    Pulses:       Radial pulses are 2+ on the right side, and 2+ on the left side.        Dorsalis pedis pulses are 1+ on the right side, and 1+ on the left side.    Pulmonary/Chest: No accessory muscle usage. She is not intubated. No respiratory distress. She has decreased breath sounds. She has rales.   Abdominal: Soft. Normal appearance. She exhibits distension (mild). Bowel sounds are hypoactive. There is no tenderness.   Genitourinary:   Genitourinary Comments: Rodriguez    Musculoskeletal: Normal range of motion.   bilat hand edema   Lymphadenopathy:     She has no cervical adenopathy.     She has no axillary adenopathy.   Neurological: She is alert. Lethargic: but much improved.   Eyes open and follow commands.  Verbally responsive with some mumbled speech still lethargic but much improved.    Skin: Skin is warm and dry. Bruising (bilat hands and UEs) noted. She is not diaphoretic. No cyanosis.   Psychiatric: She exhibits a depressed mood. She exhibits disordered thought content, abnormal recent memory and abnormal remote memory. She has a flat affect.       Laboratory (Last 24H):  CBC:    Recent Labs  Lab 05/23/17  0445   WBC 9.55   HGB 11.5*   HCT 36.4*        CMP:    Recent Labs  Lab 05/23/17  0445   CALCIUM 8.4*      K 4.2   CO2 29      BUN 8   CREATININE 0.6       Coagulation:   Recent Labs  Lab 05/20/17  0911  05/23/17  0445   INR 1.0  --   --    APTT 23.1  < > 37.1*   < > = values in this interval not displayed.    Chest X-Ray:         No recent film last 24 hours     ASSESSMENT/PLAN:     Problem   Acute On Chronic Respiratory Failure With Hypoxia   Delirium Due to Another Medical Condition, Acute, Hyperactive   Copd Exacerbation   Chronic Systolic Heart Failure   Physical Deconditioning   Depression   Anxiety   Tobacco Abuse       PLAN:    1. Neuro: neuro monitoring and improving.  Avoid sedation and narcotics.  Stopped Seroquel.  Neuro following.     2. Pulmonary: Encourage C&DB and OOB.  Cont Duonebs, Formoterol, Budesonide.  Hilda BiPAP at night only.  NT suction prn.     3. Cardiac: Cardiac monitoring and Cards following.  Planning C once pulm  status stable.  will stop Heparin infusion and cont ASA and Coreg     4. Renal: Rodriguez in place, monitor I/Os      5. Infectious Disease: Follow fever curve     6. Hematology/Oncology: monitor for bleeding     7. Endocrine:  Monitor glucose stable at 116     8. Fluids/Electrolytes/Nutrition/GI: cont bolus TF and Lactulose.  Failed bedside ST swallow eval today.     9. Musculoskeletal:  ROM.  Consult PT and OT     10. Pain Management: no issues      11. Discharge and Palliative Care:  LTAC vs home with home health.      Preventive Measures and Monitoring:   Stress Ulcer: PPI  Nutrition: Bolus TF  Glucose control: stable  Bowel prophylaxis: PRN Dulcolax and scheduled Lactulose  DVT prophylaxis: LMWH/SCDs  Hx CAD on B-Blocker: Lopressor  Head of Bed/Reposition: Elevate HOB and turn Q1-2 hours    Early Mobility: OOB today  NG Day: #5  Rodriguez Day: #5  Code Status: Full    Counseling/Consultation: I have discussed the care of this patient in detail with multidisciplinary team during rounds, bedside nursing staff and Dr. Broussard and Dr. Sanchez    Critical Care Time greater than: 47 minutes    Critical care was time spent personally by me on the following activities: development of treatment plan with patient or surrogate and bedside caregivers, discussions with consultants, evaluation of patient's response to treatment, examination of patient, ordering and performing treatments and interventions, ordering and review of laboratory studies, ordering and review of radiographic studies, pulse oximetry, re-evaluation of patient's condition.  This critical care time did not overlap with that of any other provider or involve time of any procedures.    POLLY Olsen [facility]  [facility]

## 2017-05-23 NOTE — PROGRESS NOTES
[facility] Hospital Medicine  Progress Note    Patient Name: Jeniffer Fernandez  MRN: [id number]  Patient Class: IP- Inpatient   Admission Date: 5/14/2017  Length of Stay: 9 days  Attending Physician: Adin Sanchez MD  Primary Care Provider: Ben Pelaez Jr, MD    Subjective:     Principal Problem:Acute on chronic respiratory failure with hypoxia    HPI:  Jeniffer Fernandez is a 58 yo female + smoker with COPD and chronic respiratory failure who presented with worsening SOB over 1 week. For 5 days she had worn oxygen continuously. She describes frequent productive cough, wheezing, SOB, generalized weakness and FRANCES. Prior to presentation she had subjective fever and sweats. Pt has had several neb treatments and still having SOB. In the ED, ABG reflects acute on chronic hypoxic respiratory failure and O2 sat 87 %. CTA of Chest indicated a patchy RUL/RLL interstitial infiltrate.     Hospital Course:  Very agitated late morning 16 May and received Haldol and Ativan and slept most of the day.  She was better the following morning but became more confused and agitated in the evening and combative with staff.  Increased wheezing and pulling her oxygen off with desaturations.  When stable her blood gas showed adequate oxygenation and a pCO2 only slightly high at 50.  Transferred to ICU for respiratory failure. Was started on Precedex that has now been discontinued.    5/18 - Continued to have intermittent periods of agitation. Negative head CT and Neurology consult placed.    5/19 - Evening agitation.    5/20 - Started heparin drip and aspirin per Cardiology recommendations.  Echo shows HF with reduced ejection fraction of 30% and slightly elevated troponin.    5/21 - Symptoms of agitation have improved.    5/22 - Patient with continued improved in mental status.    Interval History: Patient seen and examined in the room with the family. Denied any acute complaints today.    Review of Systems   Constitutional:  Negative for activity change and appetite change.   HENT: Negative for congestion and dental problem.    Respiratory: Negative for cough and chest tightness.    Cardiovascular: Negative for chest pain and leg swelling.   Gastrointestinal: Negative for abdominal distention and abdominal pain.   Endocrine: Negative for cold intolerance.   Neurological: Negative for dizziness.   Psychiatric/Behavioral: Negative for agitation.     Objective:     Vital Signs (Most Recent):  Temp: 98.8 °F (37.1 °C) (05/23/17 1502)  Pulse: 99 (05/23/17 1502)  Resp: 16 (05/23/17 1502)  BP: 113/89 (05/23/17 1502)  SpO2: 97 % (05/23/17 1502) Vital Signs (24h Range):  Temp:  [98.3 °F (36.8 °C)-99 °F (37.2 °C)] 98.8 °F (37.1 °C)  Pulse:  [] 99  Resp:  [13-29] 16  SpO2:  [74 %-100 %] 97 %  BP: ()/() 113/89     Weight: 49.4 kg (108 lb 14.5 oz)  Body mass index is 18.69 kg/m².    Intake/Output Summary (Last 24 hours) at 05/23/17 1604  Last data filed at 05/23/17 1500   Gross per 24 hour   Intake           2192.8 ml   Output             2435 ml   Net           -242.2 ml      Physical Exam   Constitutional: She appears well-developed and well-nourished. No distress.   HENT:   Head: Normocephalic and atraumatic.   Mouth/Throat: Oropharynx is clear and moist.   Eyes: Conjunctivae and EOM are normal. Pupils are equal, round, and reactive to light.   Neck: Neck supple. No JVD present. No thyromegaly present.   Cardiovascular: Normal rate and regular rhythm.  Exam reveals no gallop and no friction rub.    No murmur heard.  Pulmonary/Chest: She has wheezes. She has no rales.   Coarse crackles bilaterally.  Expiratory wheezes and generally reduced breath sounds.   Abdominal: Soft. Bowel sounds are normal. She exhibits no distension. There is no tenderness. There is no rebound and no guarding.   Musculoskeletal: Normal range of motion. She exhibits no edema or deformity.   Lymphadenopathy:     She has no cervical adenopathy.    Neurological: She has normal reflexes.   Somnolent   Skin: Skin is warm and dry. No rash noted.   Nursing note and vitals reviewed.    Significant Labs:   A1C: No results for input(s): HGBA1C in the last 4320 hours.  ABGs:   Recent Labs  Lab 05/22/17  0439   PH 7.427   PCO2 43.6   HCO3 28.8*   POCSATURATED 93*   BE 4     Bilirubin:   Recent Labs  Lab 05/15/17  0604 05/18/17  0442 05/19/17  0409 05/20/17  0400 05/21/17  0418   BILIDIR  --   --  0.1  --   --    BILITOT 0.2 0.3 0.3 0.3 0.3     Blood Culture: No results for input(s): LABBLOO in the last 48 hours.  BMP:   Recent Labs  Lab 05/23/17  0445   *      K 4.2      CO2 29   BUN 8   CREATININE 0.6   CALCIUM 8.4*   MG 2.1     CBC:   Recent Labs  Lab 05/23/17 0445   WBC 9.55   HGB 11.5*   HCT 36.4*        CMP:   Recent Labs  Lab 05/22/17  0425 05/23/17  0445    140   K 3.6 4.2    106   CO2 29 29   * 116*   BUN 11 8   CREATININE 0.6 0.6   CALCIUM 7.9* 8.4*   ANIONGAP 6* 5*   EGFRNONAA >60 >60     Cardiac Markers: No results for input(s): CKMB, MYOGLOBIN, BNP, TROPISTAT in the last 48 hours.  Coagulation:   Recent Labs  Lab 05/23/17 0445   APTT 37.1*     Lactic Acid: No results for input(s): LACTATE in the last 48 hours.  Lipase: No results for input(s): LIPASE in the last 48 hours.  Lipid Panel: No results for input(s): CHOL, HDL, LDLCALC, TRIG, CHOLHDL in the last 48 hours.    Significant Imaging:   Imaging Results          X-Ray Chest 1 View (Final result)  Result time 05/22/17 08:09:28    Final result by Edison Shrestha MD (05/22/17 08:09:28)                 Impression:     As above.            Electronically signed by: EDISON SHRESTHA MD  Date:     05/22/17  Time:    08:09              Narrative:    Exam: XR CHEST 1 VIEW    Clinical History: Shortness of breath. SOB    Findings:     Spell and fainting interstitial infiltrate in the right upper lobe.  Reticular interstitial opacities appear increased in the periphery of  the lower lobes. The cardiac silhouette is within normal limits.  No pneumothorax.  NG tube in the stomach.  Emphysema.                             X-Ray Chest 1 View (Final result)  Result time 05/21/17 08:03:02    Final result by Magno Salomon MD (05/21/17 08:03:02)                 Impression:     See above.      Electronically signed by: MAGNO SALOMON  Date:     05/21/17  Time:    08:03              Narrative:    Chest x-ray single view    Indication: Shortness of breath.    Findings: Comparison study 5/20/2017.  No change.                             US Abdomen Limited (Final result)  Result time 05/20/17 12:54:42    Final result by Hussein Simon MD (05/20/17 12:54:42)                 Impression:           1.  1.7 cm right hepatic lobe cyst.  2.  Mild gallbladder wall thickening, without gallstones, pericholecystic fluid or sonographic Newsome sign.  Chronic cholecystitis could cause this appearance.  3.  Otherwise, normal study.      Electronically signed by: HUSSEIN SIMON MD  Date:     05/20/17  Time:    12:54              Narrative:    Right quadrant ultrasound, color-flow and spectral doppler interrogation    History:    Acute encephalopathy with elevated Ammonia.  Suspect fibrosis/cirrhosis but no ascites..  Abnormal results of liver function studies.    Findings:   No comparison studies are available.  There is a 1.7 cm right hepatic lobe cyst. The liver is otherwise normal, and measures 13.5 cm. The gallbladder is normal.  Mild gallbladder wall thickening. Negative for sonographic Newsome's sign.  The common duct measures 4 mm. The right kidney measures 10.5 cm.  It is without focal solid or cystic masses, hydronephrosis, perinephric fluid collections or shadowing stones. The visualized portions of the pancreas are normal.  The aorta and IVC are normal in caliber.  Hepatopedal flow is documented in the portal vein.  Negative for ascites.    Hepatorenal index is not recorded.                              X-Ray Chest 1 View (Final result)  Result time 05/20/17 09:15:27    Final result by David Elliott Jr., MD (05/20/17 09:15:27)                 Impression:     No significant interval change.      Electronically signed by: DAVID ELLIOTT  Date:     05/20/17  Time:    09:15              Narrative:    Exam: Portable chest radiograph    History:    Shortness of breath.    Comparison: 5/19/2017    Findings: Lungs are clear with prominent interstitium similar as before.  Enteric tube is in the stomach.  Cardiac silhouette and mediastinum within normal limits.  No effusion or pneumothorax.                             X-Ray Chest 1 View (Final result)  Result time 05/19/17 20:37:23    Final result by CHILANGO Rodriguez Sr., MD (05/19/17 20:37:23)                 Impression:        1.  There is a mild amount of interstitial opacities seen in both lungs with Kerley B-lines visualized bilaterally.  This is characteristic of pulmonary edema.  2.  An NG tube remains in place.    3.  There is surgical hardware in the proximal portion of the left humerus.        Electronically signed by: CHILANGO RODRIGUEZ MD  Date:     05/19/17  Time:    20:37              Narrative:    One-view chest x-ray    Clinical History: Hypoxia    Finding: Comparison was made to a prior examination performed on 5/18/2017.  An NG tube remains in place.  The size of the heart is normal.  There is a mild amount of interstitial opacities seen in both lungs with Kerley B-lines visualized bilaterally.  There is no pneumothorax.  The costophrenic angles are sharp.  There is surgical hardware in the proximal portion of the left humerus.                             X-Ray Chest 1 View (Final result)  Result time 05/18/17 11:49:18    Final result by Adam Caruso MD (05/18/17 11:49:18)                 Impression:      Please see above.      Electronically signed by: ADAM CARUSO MD  Date:     05/18/17  Time:    11:49              Narrative:    Exam: XR CHEST  1 VIEW,    Date:  05/18/17 11:04:25    History: NG tube placement    Comparison:  Earlier film from the same day.    Findings: Nasogastric tube has been placed with the distal tip well below the GE junction in good position.  Otherwise no change.                             CT Head Without Contrast (Final result)  Result time 05/18/17 10:46:24    Final result by James Caruso MD (05/18/17 10:46:24)                 Impression:         Negative noncontrast CT scan of the brain.         All CT scans at this facility use dose modulation, iterative reconstruction, and/or weight based dosing when appropriate to reduce radiation dose to as low as reasonably achievable.       Electronically signed by: JAMES CARUSO MD  Date:     05/18/17  Time:    10:46              Narrative:    CT HEAD WITHOUT CONTRAST    Date: 05/18/17 10:36:40    Clinical indication: Altered mental status.  Nonresponsive patient.     Technique:  Standard noncontrast CT scan of the brain. No previous for comparison.     Findings:  The ventricles are nondilated. Gray and white matter structures reveal normal attenuation. No hemorrhage, mass effect or edema is identified.     The skull and skull base are unremarkable.                             X-Ray Chest 1 View (Final result)  Result time 05/18/17 08:02:46    Final result by James Caruso MD (05/18/17 08:02:46)                 Impression:      Please see above.      Electronically signed by: JAMES CARUSO MD  Date:     05/18/17  Time:    08:02              Narrative:    Exam: XR CHEST 1 VIEW,    Date:  05/18/17 05:34:00    History: SOB    Comparison:  05/14/2017.    Findings: Heart size is normal.  No pneumothorax.  Reticulonodular lung disease is slightly improved.  Postoperative changes left humeral neck.                             CTA Chest Non-Coronary (Final result)  Result time 05/14/17 10:56:46    Final result by Dustin Caal MD (05/14/17 10:56:46)                  Impression:      Negative for pulmonary embolus.    Patchy right upper lobe and right lower lobe interstitial infiltrate/pneumonia with some nodularity at the dependent right lower lobe.  No pleural effusion.  Short term imaging followup after appropriate treatment is recommended.    Mediastinal adenopathy, perhaps reactive.  Attention on followup.    Emphysema.    Moderate stenosis proximal celiac artery.          All CT scans at this facility use dose modulation, iterative reconstruction, and/or weight based dosing when appropriate to reduce radiation dose to as low as reasonably achievable.      Electronically signed by: DAVID CAAL MD  Date:     05/14/17  Time:    10:56              Narrative:    EXAM: CTA CHEST NON CORONARY    CLINICAL HISTORY: shortness of breath    TECHNIQUE: CT angiogram performed of the chest following IV contrast administration to evaluate for pulmonary emboli. Multiplanar MIP reformations performed.    COMPARISON STUDIES: Chest x-ray May 14, 2017    FINDINGS:  No evidence of pulmonary embolus.  Minimal aortic atherosclerosis without aneurysm or dissection.    Marked emphysematous changes in the lungs with apical and paraseptal bullous changes.  Patchy interstitial infiltrate posterior right upper lobe and posterior right lower lobe.  Some areas of nodularity in the dependent right lung base within this area of interstitial thickening with largest area of nodularity measuring 1.9 cm.    Numerous enlarged mediastinal lymph nodes are noted with a representative subcarinal lymph node measuring 1.4 x 1.3 cm.    Right hepatic lobe cyst.  Moderate narrowing proximal segment celiac artery.                             X-Ray Chest 1 View (Final result)  Result time 05/14/17 09:11:48    Final result by David Caal MD (05/14/17 09:11:48)                 Impression:      Diffuse chronic interstitial prominence with improving right upper lobe infiltrate with a small amount of residual right upper  lobe interstitial markings remaining from prior chest x-ray.  Continued short-term followup recommended.      Electronically signed by: DAVID ODOM MD  Date:     05/14/17  Time:    09:11              Narrative:    EXAM: XR CHEST 1 VIEW    CLINICAL HISTORY: Shortness of breath.    COMPARISON STUDIES: December 22, 2016    FINDINGS:  The cardiomediastinal silhouette is within normal limits.  Diffuse mild interstitial prominence throughout the lungs with minimal asymmetric markings in the right upper lobe, though improved from prior exam.  Surgical plate proximal left humerus.                                Assessment/Plan:      Delirium due to another medical condition, acute, hyperactive    Her episodes of confusion and agitation are unclear. She has a history of depression and misuse of medication and alcohol. CT head is negative for an acute intracranial process.  Neurology evaluated.  EEG unrevealing.  Ammonia level has normalized. Symptoms are improving slowly. Seroquel has been discontinued. Avoid sedating medications.        * Acute on chronic respiratory failure with hypoxia    Multifactorial from COPD, heart failure, and pneumonia.  Supplemental oxygen to maintain sat > 92 % - wean as appropriate.  She has severe underlying COPD with most recent PFT in 2014 and has continued to smoke since then.  Pulmonary following.        Anxiety    Holding all home psychiatric medications.  Careful use of Benzodiazepines with severe COPD.  PRN Ativan for agitation.  Seroquel has been discontinued.        COPD exacerbation    Supplemental oxygen  Xopenex  IV Corticosteroids held.        LFT elevation    LFT's were normal on admission.  Check daily LFTs.  Lactulose given yesterday and patient's ammonia level decreased but is up to 55 today.  Liver Ultrasound showed a hepatic cyst and a mildly thickened gall bladder. Check hepatitis panel.        Chronic systolic heart failure    Most likely present when she was admitted given  her history and risk factors.  Echo showed ejection fraction of 30%.  Cardiology on consult with recommendations.  Optimize medical management.  Suspect ischemic heart disease and anticipate LHC when medically stable. Heparin infusion has been discontinued today. Patient started on ASA and Clopidogrel.        Tobacco abuse    Must quit smoking and alcohol.  Denies need for Nicotine patch        Bipolar disorder    Holding all psychiatric meds.  She endorsed a history of misuse of Alcohol and Latuda.   denies any formal diagnosis of Bipolar Disorder.          VTE Risk Mitigation         Ordered     enoxaparin injection 40 mg  Daily     Route:  Subcutaneous        05/23/17 1153     Medium Risk of VTE  Once      05/14/17 1132     Place sequential compression device  Until discontinued      05/14/17 1132          Adin Sanchez MD  Department of Hospital Medicine   [facility]

## 2017-05-23 NOTE — ASSESSMENT & PLAN NOTE
Holding all home psychiatric medications.  Careful use of Benzodiazepines with severe COPD.  PRN Ativan for agitation.  Seroquel has been discontinued.

## 2017-05-23 NOTE — ASSESSMENT & PLAN NOTE
Her episodes of confusion and agitation are unclear. She has a history of depression and misuse of medication and alcohol. CT head is negative for an acute intracranial process.  Neurology evaluated.  EEG unrevealing.  Ammonia level has normalized. Symptoms are improving slowly. Seroquel has been discontinued. Avoid sedating medications.

## 2017-05-23 NOTE — PT/OT/SLP EVAL
Physical Therapy  Evaluation    Jeniffer Fernandez   MRN: [id number]   Admitting Diagnosis: Acute on chronic respiratory failure with hypoxia    PT Received On: 17  PT Start Time: 1510     PT Stop Time: 1535    PT Total Time (min): 25 min       Billable Minutes:  Evaluation 15 and Therapeutic Activity 10    Diagnosis: Acute on chronic respiratory failure with hypoxia  P.T. RX DX:  IMPAIRED MOBILITY    Past Medical History:   Diagnosis Date    Anxiety     COPD (chronic obstructive pulmonary disease)     Depression     Respiratory distress     SOB (shortness of breath)       Past Surgical History:   Procedure Laterality Date    COLONOSCOPY      TUBAL LIGATION         Referring physician: DR GONZALEZ  Date referred to PT: 17    General Precautions: Standard, fall, aspiration  Orthopedic Precautions: N/A   Braces: N/A            Patient History:  Lives With: spouse  Living Arrangements: house  Home Accessibility: stairs to enter home  Number of Stairs to Enter Home: 3  Stair Railings at Home: none  Transportation Available: car, family or friend will provide  Equipment Currently Used at Home: oxygen (NEBULIZER)  DME owned (not currently used): none    Previous Level of Function:  Ambulation Skills: independent  Transfer Skills: independent  ADL Skills: independent  Work/Leisure Activity: independent    Subjective:  Communicated with [facility] AND NURSE VARGAS prior to session.  PT AND SPOUSE AGREE TO EVAL  Chief Complaint: WEAKNESS  Patient goals: TO GO HOME    Pain Ratin/10                    Objective:   Patient found with: blood pressure cuff, bowel management system, garcia catheter, oxygen, peripheral IV, pulse ox (continuous), telemetry, NG tube     Cognitive Exam:  Oriented to: Person, Place and Situation    Follows Commands/attention: Follows one-step commands  Communication: LOW VOICE  Safety awareness/insight to disability: impaired    Physical Exam:  Postural examination/scapula alignment: Rounded  shoulder and Head forward    Skin integrity: Visible skin intact and PRESSURE ULCER ON BUTTOCK    Lower Extremity Range of Motion:  Right Lower Extremity: WFL  Left Lower Extremity: WFL    Lower Extremity Strength:  Right Lower Extremity: Deficits: D  Left Lower Extremity: Deficits: 3+/5       Functional Mobility:  Bed Mobility:  Rolling/Turning to Left:  (PT SITTING IN RECLINER)    Transfers:  Sit <> Stand Assistance: Moderate Assistance  Sit <> Stand Assistive Device: No Assistive Device    Gait:   Gait Distance: PT STEPPED IN PLACE AT LEAST 10 STEPS HHA MIN X 1, DID NOT AMBULATE SEC TOO MANY LINES  Assistance 1: Minimum assistance  Gait Assistive Device: No device, Hand held assist      Balance:   Static Sit: FAIR-: Maintains without assist but inconsistent   Dynamic Sit: FAIR: Cannot move trunk without losing balance  Static Stand: POOR+: Needs MINIMAL assist to maintain  Dynamic stand: 0: N/A    Therapeutic Activities and Exercises:  PT STOOD FROM CHAIR X 2  TRIALS, AND MARCHED IN PLACE WITH DBE ENCOURAGED.  PT AND FAMILY EDUCATION.    AM-PAC 6 CLICK MOBILITY  How much help from another person does this patient currently need?   1 = Unable, Total/Dependent Assistance  2 = A lot, Maximum/Moderate Assistance  3 = A little, Minimum/Contact Guard/Supervision  4 = None, Modified [place]/Independent    Turning over in bed (including adjusting bedclothes, sheets and blankets)?: 1  Sitting down on and standing up from a chair with arms (e.g., wheelchair, bedside commode, etc.): 2  Moving from lying on back to sitting on the side of the bed?: 1  Moving to and from a bed to a chair (including a wheelchair)?: 2  Need to walk in hospital room?: 2  Climbing 3-5 steps with a railing?: 1  Total Score: 9     AM-PAC Raw Score CMS G-Code Modifier Level of Impairment Assistance   6 % Total / Unable   7 - 9 CM 80 - 100% Maximal Assist   10 - 14 CL 60 - 80% Moderate Assist   15 - 19 CK 40 - 60% Moderate Assist   20 -  22 CJ 20 - 40% Minimal Assist   23 CI 1-20% SBA / CGA   24 CH 0% Independent/ Mod I     Patient left up in chair with all lines intact, call button in reach, NURSE notified and  present.    Assessment:   Jeniffer Fernandez is a 57 y.o. female with a medical diagnosis of Acute on chronic respiratory failure with hypoxia and presents with IMPAIRED MOBILITY AND WILL BENEFIT FROM SKILLED P.T. INTERVENTION TO ADDRESS STATED GOALS AND IMPROVE FUNCTION..    Rehab identified problem list/impairments: Rehab identified problem list/impairments: weakness, impaired endurance, impaired self care skills, impaired functional mobilty, gait instability, impaired balance, decreased coordination, decreased upper extremity function, decreased lower extremity function, impaired skin, edema, decreased safety awareness, impaired cognition    Rehab potential is good.    Activity tolerance: Good    Discharge recommendations: Discharge Facility/Level Of Care Needs: rehabilitation facility     Barriers to discharge: Barriers to Discharge: None    Equipment recommendations: Equipment Needed After Discharge: walker, rolling (WILL FURTHER ASSESS)     GOALS:    Physical Therapy Goals     Not on file                PLAN:    Patient to be seen  (AT LEAST 5 OF 7 DAYS A WEEK) to address the above listed problems via gait training, therapeutic activities, therapeutic exercises  Plan of Care expires: 05/30/17  Plan of Care reviewed with: patient, spouse          Molly Grossman, PT  05/23/2017

## 2017-05-23 NOTE — PROGRESS NOTES
Cardiology Progress Note        SUBJECTIVE:     History of Present Illness:  Patient is a 57 y.o. female presents with mildly elevated troponin and LVEF of 30 %. She is starting to wake up, however continues to keep eyes close and is very lethargic.  No signs of ADHF or Acute Myocardial ischemia.      OBJECTIVE:     Vital Signs (Most Recent)  Temp: 98.6 °F (37 °C) (05/23/17 1105)  Pulse: 105 (05/23/17 1300)  Resp: (!) 22 (05/23/17 1300)  BP: 92/66 (05/23/17 1300)  SpO2: 95 % (05/23/17 1300)    Vital Signs Range (Last 24H):  Temp:  [98.2 °F (36.8 °C)-99 °F (37.2 °C)]   Pulse:  []   Resp:  [13-29]   BP: ()/()   SpO2:  [74 %-100 %]     Intake/Output last 3 shifts:  I/O last 3 completed shifts:  In: 3773.6 [I.V.:2133.6; NG/GT:1540; IV Piggyback:100]  Out: 2225 [Urine:1975; Stool:250]    Intake/Output this shift:  I/O this shift:  In: 472 [I.V.:42; NG/GT:430]  Out: 1200 [Urine:1200]    Review of patient's allergies indicates:   Allergen Reactions    Codeine Itching    Codeine phosphate      Itchy (skin)^       Current Facility-Administered Medications   Medication    0.9%  NaCl infusion    acetaminophen tablet 650 mg    albuterol-ipratropium 2.5mg-0.5mg/3mL nebulizer solution 3 mL    albuterol-ipratropium 2.5mg-0.5mg/3mL nebulizer solution 3 mL    aluminum-magnesium hydroxide-simethicone 200-200-20 mg/5 mL suspension 30 mL    arformoterol nebulizer solution 15 mcg    aspirin EC tablet 81 mg    atorvastatin tablet 40 mg    budesonide nebulizer solution 0.5 mg    carvedilol tablet 3.125 mg    clopidogrel tablet 75 mg    enoxaparin injection 40 mg    haloperidol lactate injection 5 mg    lactulose 20 gram/30 mL solution Soln 15 g    ondansetron injection 4 mg    pantoprazole injection 40 mg     No current facility-administered medications on file prior to encounter.      Current Outpatient Prescriptions on File Prior to Encounter   Medication Sig    amitriptyline (ELAVIL) 50 MG tablet  Take 1 tablet (50 mg total) by mouth every evening. (Patient taking differently: Take 25 mg by mouth 3 (three) times daily. )    lorazepam (ATIVAN) 0.5 MG tablet Take 0.5 mg by mouth every 6 (six) hours as needed for Anxiety.    montelukast (SINGULAIR) 10 mg tablet Take 10 mg by mouth every evening.    paroxetine (PAXIL) 20 MG tablet Take 40 mg by mouth every morning.     predniSONE (DELTASONE) 10 MG tablet Please take 20mg twice daily x 3 days, then take 10 mg twice daily x 2 days, then take 10 mg once daily x 2 days    ropinirole (REQUIP) 1 MG tablet Take 1 mg by mouth every evening.    b complex vitamins tablet Take 1 tablet by mouth once daily.    docusate sodium (COLACE) 100 MG capsule Take 1 capsule (100 mg total) by mouth 2 (two) times daily.    fluticasone (FLONASE) 50 mcg/actuation nasal spray 2 sprays by Each Nare route once daily.    mometasone-formoterol (DULERA) 200-5 mcg/actuation inhaler Inhale 2 puffs into the lungs 2 (two) times daily.    nicotine (NICODERM CQ) 21 mg/24 hr Place 1 patch onto the skin once daily.       Physical Exam:  General appearance: lethargic and difficult to arrouse, appears stated age and cooperative  Head: Normocephalic, without obvious abnormality, atraumatic  Eyes:  conjunctivae/corneas clear. PERRL, EOM's intact. Fundi benign.  Nose: no discharge  Throat: normal findings: tongue midline and normal  Neck: no adenopathy, no carotid bruit, no JVD, supple, symmetrical, trachea midline and thyroid not enlarged, symmetric, no tenderness/mass/nodules  Back:  no skin lesions, erythema, or scars  Lungs: BBS deminish with faint wheezing  Chest wall: no tenderness  Heart: regular rate and rhythm, S1, S2 normal, no murmur, click, rub or gallop  Abdomen: soft, non-tender; bowel sounds normal; no masses,  no organomegaly  Extremities: extremities normal, atraumatic, no cyanosis or edema  Pulses: 1+ and symmetric  Skin: Skin color, texture, turgor normal. No rashes or  lesions  Neurologic: Grossly normal    Laboratory:  Chemistry:   Lab Results   Component Value Date     05/23/2017    K 4.2 05/23/2017     05/23/2017    CO2 29 05/23/2017    BUN 8 05/23/2017    CREATININE 0.6 05/23/2017    CALCIUM 8.4 (L) 05/23/2017     Cardiac Markers:   Lab Results   Component Value Date    TROPONINI 0.100 (H) 05/20/2017     Cardiac Markers (Last 3):   Lab Results   Component Value Date    TROPONINI 0.100 (H) 05/20/2017    TROPONINI 0.107 (H) 05/19/2017    TROPONINI 0.128 (H) 05/19/2017     CBC:   Lab Results   Component Value Date    WBC 9.55 05/23/2017    HGB 11.5 (L) 05/23/2017    HCT 36.4 (L) 05/23/2017     (H) 05/23/2017     05/23/2017     Lipids: No results found for: CHOL, TRIG, HDL, LDLDIRECT  Coagulation:   Lab Results   Component Value Date    INR 1.0 05/20/2017    APTT 37.1 (H) 05/23/2017       Diagnostic Results:  ECG: Reviewed  X-Ray: Reviewed  US: Reviewed  CT: Reviewed  Echo: Reviewed      ASSESSMENT/PLAN:     Patient Active Problem List   Diagnosis    Closed fracture of proximal end of left humerus    Fracture of proximal humerus    Pneumonia    Depression    Anxiety    Bipolar disorder    COPD, severe    Tobacco abuse    Abdominal pain    Lung infiltrate    COPD exacerbation    Acute on chronic respiratory failure with hypoxia    Delirium due to another medical condition, acute, hyperactive    LFT elevation    Chronic systolic heart failure    Physical deconditioning        Plan:     Continue Coreg and ASA  Start Plavix 75 mg daily  Start Atorvastatin 40 mg daily  No ACE for now due to low blood pressure  Left Heart Cath when respiratory and encephalopathy improves     Chart reviewed. Dr. Chadwick agrees with plan as outlined above.

## 2017-05-23 NOTE — CONSULTS
Consult was completed on 5/22 at 12:27PM. Please see consult note for full assessment and recommendations.

## 2017-05-23 NOTE — SUBJECTIVE & OBJECTIVE
Interval History: Patient seen and examined in the room with the family. Denied any acute complaints today.    Review of Systems   Constitutional: Negative for activity change and appetite change.   HENT: Negative for congestion and dental problem.    Respiratory: Negative for cough and chest tightness.    Cardiovascular: Negative for chest pain and leg swelling.   Gastrointestinal: Negative for abdominal distention and abdominal pain.   Endocrine: Negative for cold intolerance.   Neurological: Negative for dizziness.   Psychiatric/Behavioral: Negative for agitation.     Objective:     Vital Signs (Most Recent):  Temp: 98.8 °F (37.1 °C) (05/23/17 1502)  Pulse: 99 (05/23/17 1502)  Resp: 16 (05/23/17 1502)  BP: 113/89 (05/23/17 1502)  SpO2: 97 % (05/23/17 1502) Vital Signs (24h Range):  Temp:  [98.3 °F (36.8 °C)-99 °F (37.2 °C)] 98.8 °F (37.1 °C)  Pulse:  [] 99  Resp:  [13-29] 16  SpO2:  [74 %-100 %] 97 %  BP: ()/() 113/89     Weight: 49.4 kg (108 lb 14.5 oz)  Body mass index is 18.69 kg/m².    Intake/Output Summary (Last 24 hours) at 05/23/17 1604  Last data filed at 05/23/17 1500   Gross per 24 hour   Intake           2192.8 ml   Output             2435 ml   Net           -242.2 ml      Physical Exam   Constitutional: She appears well-developed and well-nourished. No distress.   HENT:   Head: Normocephalic and atraumatic.   Mouth/Throat: Oropharynx is clear and moist.   Eyes: Conjunctivae and EOM are normal. Pupils are equal, round, and reactive to light.   Neck: Neck supple. No JVD present. No thyromegaly present.   Cardiovascular: Normal rate and regular rhythm.  Exam reveals no gallop and no friction rub.    No murmur heard.  Pulmonary/Chest: She has wheezes. She has no rales.   Coarse crackles bilaterally.  Expiratory wheezes and generally reduced breath sounds.   Abdominal: Soft. Bowel sounds are normal. She exhibits no distension. There is no tenderness. There is no rebound and no guarding.    Musculoskeletal: Normal range of motion. She exhibits no edema or deformity.   Lymphadenopathy:     She has no cervical adenopathy.   Neurological: She has normal reflexes.   Somnolent   Skin: Skin is warm and dry. No rash noted.   Nursing note and vitals reviewed.    Significant Labs:   A1C: No results for input(s): HGBA1C in the last 4320 hours.  ABGs:   Recent Labs  Lab 05/22/17  0439   PH 7.427   PCO2 43.6   HCO3 28.8*   POCSATURATED 93*   BE 4     Bilirubin:   Recent Labs  Lab 05/15/17  0604 05/18/17  0442 05/19/17  0409 05/20/17  0400 05/21/17  0418   BILIDIR  --   --  0.1  --   --    BILITOT 0.2 0.3 0.3 0.3 0.3     Blood Culture: No results for input(s): LABBLOO in the last 48 hours.  BMP:   Recent Labs  Lab 05/23/17  0445   *      K 4.2      CO2 29   BUN 8   CREATININE 0.6   CALCIUM 8.4*   MG 2.1     CBC:   Recent Labs  Lab 05/23/17  0445   WBC 9.55   HGB 11.5*   HCT 36.4*        CMP:   Recent Labs  Lab 05/22/17  0425 05/23/17  0445    140   K 3.6 4.2    106   CO2 29 29   * 116*   BUN 11 8   CREATININE 0.6 0.6   CALCIUM 7.9* 8.4*   ANIONGAP 6* 5*   EGFRNONAA >60 >60     Cardiac Markers: No results for input(s): CKMB, MYOGLOBIN, BNP, TROPISTAT in the last 48 hours.  Coagulation:   Recent Labs  Lab 05/23/17  0445   APTT 37.1*     Lactic Acid: No results for input(s): LACTATE in the last 48 hours.  Lipase: No results for input(s): LIPASE in the last 48 hours.  Lipid Panel: No results for input(s): CHOL, HDL, LDLCALC, TRIG, CHOLHDL in the last 48 hours.    Significant Imaging:   Imaging Results          X-Ray Chest 1 View (Final result)  Result time 05/22/17 08:09:28    Final result by Edison Shrestha MD (05/22/17 08:09:28)                 Impression:     As above.            Electronically signed by: EDISON SHRESTHA MD  Date:     05/22/17  Time:    08:09              Narrative:    Exam: XR CHEST 1 VIEW    Clinical History: Shortness of breath. SOB    Findings:     Spell  and fainting interstitial infiltrate in the right upper lobe.  Reticular interstitial opacities appear increased in the periphery of the lower lobes. The cardiac silhouette is within normal limits.  No pneumothorax.  NG tube in the stomach.  Emphysema.                             X-Ray Chest 1 View (Final result)  Result time 05/21/17 08:03:02    Final result by Magno Salomon MD (05/21/17 08:03:02)                 Impression:     See above.      Electronically signed by: MAGNO SALOMON  Date:     05/21/17  Time:    08:03              Narrative:    Chest x-ray single view    Indication: Shortness of breath.    Findings: Comparison study 5/20/2017.  No change.                             US Abdomen Limited (Final result)  Result time 05/20/17 12:54:42    Final result by Hussein Simon MD (05/20/17 12:54:42)                 Impression:           1.  1.7 cm right hepatic lobe cyst.  2.  Mild gallbladder wall thickening, without gallstones, pericholecystic fluid or sonographic Newsome sign.  Chronic cholecystitis could cause this appearance.  3.  Otherwise, normal study.      Electronically signed by: HUSSEIN SIMON MD  Date:     05/20/17  Time:    12:54              Narrative:    Right quadrant ultrasound, color-flow and spectral doppler interrogation    History:    Acute encephalopathy with elevated Ammonia.  Suspect fibrosis/cirrhosis but no ascites..  Abnormal results of liver function studies.    Findings:   No comparison studies are available.  There is a 1.7 cm right hepatic lobe cyst. The liver is otherwise normal, and measures 13.5 cm. The gallbladder is normal.  Mild gallbladder wall thickening. Negative for sonographic Newsome's sign.  The common duct measures 4 mm. The right kidney measures 10.5 cm.  It is without focal solid or cystic masses, hydronephrosis, perinephric fluid collections or shadowing stones. The visualized portions of the pancreas are normal.  The aorta and IVC are normal in caliber.   Hepatopedal flow is documented in the portal vein.  Negative for ascites.    Hepatorenal index is not recorded.                             X-Ray Chest 1 View (Final result)  Result time 05/20/17 09:15:27    Final result by David Elliott Jr., MD (05/20/17 09:15:27)                 Impression:     No significant interval change.      Electronically signed by: DAVID ELLIOTT  Date:     05/20/17  Time:    09:15              Narrative:    Exam: Portable chest radiograph    History:    Shortness of breath.    Comparison: 5/19/2017    Findings: Lungs are clear with prominent interstitium similar as before.  Enteric tube is in the stomach.  Cardiac silhouette and mediastinum within normal limits.  No effusion or pneumothorax.                             X-Ray Chest 1 View (Final result)  Result time 05/19/17 20:37:23    Final result by CHILANGO Rodriguez Sr., MD (05/19/17 20:37:23)                 Impression:        1.  There is a mild amount of interstitial opacities seen in both lungs with Kerley B-lines visualized bilaterally.  This is characteristic of pulmonary edema.  2.  An NG tube remains in place.    3.  There is surgical hardware in the proximal portion of the left humerus.        Electronically signed by: CHILANGO RODRIGUEZ MD  Date:     05/19/17  Time:    20:37              Narrative:    One-view chest x-ray    Clinical History: Hypoxia    Finding: Comparison was made to a prior examination performed on 5/18/2017.  An NG tube remains in place.  The size of the heart is normal.  There is a mild amount of interstitial opacities seen in both lungs with Kerley B-lines visualized bilaterally.  There is no pneumothorax.  The costophrenic angles are sharp.  There is surgical hardware in the proximal portion of the left humerus.                             X-Ray Chest 1 View (Final result)  Result time 05/18/17 11:49:18    Final result by Adam Pike MD (05/18/17 11:49:18)                 Impression:      Please  see above.      Electronically signed by: JAMES PIKE MD  Date:     05/18/17  Time:    11:49              Narrative:    Exam: XR CHEST 1 VIEW,    Date:  05/18/17 11:04:25    History: NG tube placement    Comparison:  Earlier film from the same day.    Findings: Nasogastric tube has been placed with the distal tip well below the GE junction in good position.  Otherwise no change.                             CT Head Without Contrast (Final result)  Result time 05/18/17 10:46:24    Final result by James Pike MD (05/18/17 10:46:24)                 Impression:         Negative noncontrast CT scan of the brain.         All CT scans at this facility use dose modulation, iterative reconstruction, and/or weight based dosing when appropriate to reduce radiation dose to as low as reasonably achievable.       Electronically signed by: JAMES PIKE MD  Date:     05/18/17  Time:    10:46              Narrative:    CT HEAD WITHOUT CONTRAST    Date: 05/18/17 10:36:40    Clinical indication: Altered mental status.  Nonresponsive patient.     Technique:  Standard noncontrast CT scan of the brain. No previous for comparison.     Findings:  The ventricles are nondilated. Gray and white matter structures reveal normal attenuation. No hemorrhage, mass effect or edema is identified.     The skull and skull base are unremarkable.                             X-Ray Chest 1 View (Final result)  Result time 05/18/17 08:02:46    Final result by James Pike MD (05/18/17 08:02:46)                 Impression:      Please see above.      Electronically signed by: JAMES PIKE MD  Date:     05/18/17  Time:    08:02              Narrative:    Exam: XR CHEST 1 VIEW,    Date:  05/18/17 05:34:00    History: SOB    Comparison:  05/14/2017.    Findings: Heart size is normal.  No pneumothorax.  Reticulonodular lung disease is slightly improved.  Postoperative changes left humeral neck.                             CTA Chest  Non-Coronary (Final result)  Result time 05/14/17 10:56:46    Final result by David Caal MD (05/14/17 10:56:46)                 Impression:      Negative for pulmonary embolus.    Patchy right upper lobe and right lower lobe interstitial infiltrate/pneumonia with some nodularity at the dependent right lower lobe.  No pleural effusion.  Short term imaging followup after appropriate treatment is recommended.    Mediastinal adenopathy, perhaps reactive.  Attention on followup.    Emphysema.    Moderate stenosis proximal celiac artery.          All CT scans at this facility use dose modulation, iterative reconstruction, and/or weight based dosing when appropriate to reduce radiation dose to as low as reasonably achievable.      Electronically signed by: DAVID CAAL MD  Date:     05/14/17  Time:    10:56              Narrative:    EXAM: CTA CHEST NON CORONARY    CLINICAL HISTORY: shortness of breath    TECHNIQUE: CT angiogram performed of the chest following IV contrast administration to evaluate for pulmonary emboli. Multiplanar MIP reformations performed.    COMPARISON STUDIES: Chest x-ray May 14, 2017    FINDINGS:  No evidence of pulmonary embolus.  Minimal aortic atherosclerosis without aneurysm or dissection.    Marked emphysematous changes in the lungs with apical and paraseptal bullous changes.  Patchy interstitial infiltrate posterior right upper lobe and posterior right lower lobe.  Some areas of nodularity in the dependent right lung base within this area of interstitial thickening with largest area of nodularity measuring 1.9 cm.    Numerous enlarged mediastinal lymph nodes are noted with a representative subcarinal lymph node measuring 1.4 x 1.3 cm.    Right hepatic lobe cyst.  Moderate narrowing proximal segment celiac artery.                             X-Ray Chest 1 View (Final result)  Result time 05/14/17 09:11:48    Final result by David Caal MD (05/14/17 09:11:48)                 Impression:       Diffuse chronic interstitial prominence with improving right upper lobe infiltrate with a small amount of residual right upper lobe interstitial markings remaining from prior chest x-ray.  Continued short-term followup recommended.      Electronically signed by: DAVID ODOM MD  Date:     05/14/17  Time:    09:11              Narrative:    EXAM: XR CHEST 1 VIEW    CLINICAL HISTORY: Shortness of breath.    COMPARISON STUDIES: December 22, 2016    FINDINGS:  The cardiomediastinal silhouette is within normal limits.  Diffuse mild interstitial prominence throughout the lungs with minimal asymmetric markings in the right upper lobe, though improved from prior exam.  Surgical plate proximal left humerus.

## 2017-05-23 NOTE — PLAN OF CARE
Medical status improving, however cognitive status delayed responses. Discussed possible SNF,IRF with patient's spouse previously. CM awaiting PT/OT evaluation for recommendations.     05/23/17 3817   Discharge Reassessment   Assessment Type Discharge Planning Reassessment   Can the patient answer the patient profile reliably? No, cognitively impaired  (cognitively delayed)   How does the patient rate their overall health at the present time? Fair   Describe the patient's ability to walk at the present time. Minor restrictions or changes   Discharge plan remains the same: No   Provided patient/caregiver education on the expected discharge date and the discharge plan Yes   Involved the patient/caregiver in establishing a new discharge plan: Yes

## 2017-05-23 NOTE — PHYSICIAN QUERY
PT Name: Jeniffer Fernandez  MR #: [id number]    Physician Query Form -Present on Admission (POA) Diagnosis Clarification     CDS/: Vivien Law               Contact information:[phone]     This form is a permanent document in the medical record.     Query Date: May 23, 2017    By submitting this query, we are merely seeking further clarification of documentation. Please utilize your independent clinical judgment when addressing the question(s) below.       The Medical record contains the following:    Diagnosis      Supporting Clinical Information   Location in Medical Record   Metabolic encephalopathy             She was better the following morning but became more confused and agitated in the evening and combative with  staff.      Delirium due to another medical condition, acute, hyperactive       Last night she was agitated and needed extra sedative to calm her down. Morning nurse reported that for last 3 nights, she showed agitations , confusion and needed sedatives to calm her down.  Neuro progress note 5-19        Progress note 5-18                                 Neuro progress note 5-20          Doctor, Please specify Present On Admission (POA) status of Metabolic encephalopathy.    [  ] Present on Admission   [  ] Not Present on Admission  [  ] Clinically undetermined

## 2017-05-23 NOTE — PT/OT/SLP EVAL
"Speech Language Pathology  Evaluation    Jeniffer Fernandez   MRN: [id number]   Admitting Diagnosis: Delirium due to another medical condition, acute, hyperactive    Diet recommendations: Solid Diet Level: NPO  Liquid Diet Level: NPO     SLP Treatment Date: 05/23/17  Speech Start Time: 1000     Speech Stop Time: 1025     Speech Total (min): 25 min       TREATMENT BILLABLE MINUTES:  Eval Swallow and Oral Function 25    Diagnosis: Delirium due to another medical condition, acute, hyperactive    Past Medical History:   Diagnosis Date    Anxiety     COPD (chronic obstructive pulmonary disease)     Depression     Respiratory distress     SOB (shortness of breath)      Past Surgical History:   Procedure Laterality Date    COLONOSCOPY      TUBAL LIGATION         Has the patient been evaluated by SLP for swallowing? : Yes  Keep patient NPO?: Yes   General Precautions: Standard, aspiration, fall    Current Respiratory Status: nasal cannula     Social Hx: Patient lives with her  at home and was independent with all ADL's PTA.    Prior diet: regualr.    Occupational/hobbies/homemaking: pt would take care of family children in her home.    Subjective:  Pt was seen sitting in a chair at bedside with family present.  She was sleepy but aroused to vocal stimuli.  She was cooperative and agreeable to swallow eval.  She stated that she wanted some coffee.  Her cognitive status is a bit delayed.  Patient goals: "I want coffee".    Pain Rating: 10/10        Location:  (throat when swallowing)  Pain Addressed: Nurse notified       Objective:   Patient found with: blood pressure cuff, bowel management system, garcia catheter, NG tube, oxygen, pulse ox (continuous), peripheral IV, telemetry    Oral Musculature Evaluation  Oral Musculature: general weakness  Dentition: edentulous     Bedside Swallow Eval:  Consistencies Assessed: Thin liquids and puree  Oral Phase: onset of swallow delay  Pharyngeal Phase: coughing/choking, " decreased hyolaryngeal excursion, multiple spontaneous swallows, throat clearing and wet vocal quality after swallow, odynophagia       Assessment:  Jeniffer Fernandez is a 57 y.o. female with a medical diagnosis of Delirium due to another medical condition, acute, hyperactive and presents with oropharyngeal dysphagia with possible aspiration.  Pt is not safe for po intake at this time and will benefit from continued skilled ST for an ongoing swallow eval and dysphagia therapy.   ST provided dysphagia education as well as importance of oral hygiene to pt and family.             Goals:    SLP Goals        Problem: SLP Goal    Goal Priority Disciplines Outcome   SLP Goal     SLP    Description:  1.  ST to complete an ongoing swallow assessment with MBSS if warranted to establish po readiness  2. Pt will complete oral and pharyngeal ex's with min A for increased swallow strength and coordination  3. Pt will tolerate least restrictive diet without incidence                     Plan:   Patient to be seen Therapy Frequency: 3 x/week   Plan of Care expires: 05/30/17  Plan of Care reviewed with: patient, spouse  SLP Follow-up?: Yes              Kendra Arechiga CCC-SLP  05/23/2017

## 2017-05-23 NOTE — HPI
Jeniffer Fernandez is a 56 yo female + smoker with COPD and chronic respiratory failure who presented with worsening SOB over 1 week. For 5 days she had worn oxygen continuously. She describes frequent productive cough, wheezing, SOB, generalized weakness and FRANCES. Prior to presentation she had subjective fever and sweats. Pt has had several neb treatments and still having SOB. In the ED, ABG reflects acute on chronic hypoxic respiratory failure and O2 sat 87 %. CTA of Chest indicated a patchy RUL/RLL interstitial infiltrate.

## 2017-05-23 NOTE — ASSESSMENT & PLAN NOTE
Most likely present when she was admitted given her history and risk factors.  Echo showed ejection fraction of 30%.  Cardiology on consult with recommendations.  Optimize medical management.  Suspect ischemic heart disease and anticipate LHC when medically stable. Heparin infusion has been discontinued today. Patient started on ASA and Clopidogrel.

## 2017-05-24 PROBLEM — R13.12 OROPHARYNGEAL DYSPHAGIA: Status: ACTIVE | Noted: 2017-05-24

## 2017-05-24 LAB
ALBUMIN SERPL BCP-MCNC: 1.6 G/DL
ALBUMIN SERPL BCP-MCNC: 1.6 G/DL
ALP SERPL-CCNC: 41 U/L
ALP SERPL-CCNC: 42 U/L
ALT SERPL W/O P-5'-P-CCNC: 33 U/L
ALT SERPL W/O P-5'-P-CCNC: 34 U/L
ANION GAP SERPL CALC-SCNC: 3 MMOL/L
ANION GAP SERPL CALC-SCNC: 7 MMOL/L
AST SERPL-CCNC: 14 U/L
AST SERPL-CCNC: 19 U/L
BILIRUB DIRECT SERPL-MCNC: 0.2 MG/DL
BILIRUB SERPL-MCNC: 0.3 MG/DL
BILIRUB SERPL-MCNC: 0.3 MG/DL
BUN SERPL-MCNC: 4 MG/DL
BUN SERPL-MCNC: 5 MG/DL
CALCIUM SERPL-MCNC: 5 MG/DL
CALCIUM SERPL-MCNC: 8.7 MG/DL
CHLORIDE SERPL-SCNC: 104 MMOL/L
CHLORIDE SERPL-SCNC: 122 MMOL/L
CO2 SERPL-SCNC: 19 MMOL/L
CO2 SERPL-SCNC: 28 MMOL/L
CREAT SERPL-MCNC: 0.3 MG/DL
CREAT SERPL-MCNC: 0.6 MG/DL
EST. GFR  (AFRICAN AMERICAN): >60 ML/MIN/1.73 M^2
EST. GFR  (AFRICAN AMERICAN): >60 ML/MIN/1.73 M^2
EST. GFR  (NON AFRICAN AMERICAN): >60 ML/MIN/1.73 M^2
EST. GFR  (NON AFRICAN AMERICAN): >60 ML/MIN/1.73 M^2
GLUCOSE SERPL-MCNC: 104 MG/DL
GLUCOSE SERPL-MCNC: 71 MG/DL
MAGNESIUM SERPL-MCNC: 2 MG/DL
PHOSPHATE SERPL-MCNC: 1.9 MG/DL
POTASSIUM SERPL-SCNC: 2.5 MMOL/L
POTASSIUM SERPL-SCNC: 4.2 MMOL/L
PROT SERPL-MCNC: 3.1 G/DL
PROT SERPL-MCNC: 3.2 G/DL
SODIUM SERPL-SCNC: 139 MMOL/L
SODIUM SERPL-SCNC: 144 MMOL/L

## 2017-05-24 PROCEDURE — 25000003 PHARM REV CODE 250: Performed by: INTERNAL MEDICINE

## 2017-05-24 PROCEDURE — 94640 AIRWAY INHALATION TREATMENT: CPT

## 2017-05-24 PROCEDURE — 97530 THERAPEUTIC ACTIVITIES: CPT

## 2017-05-24 PROCEDURE — 25000003 PHARM REV CODE 250: Performed by: NURSE PRACTITIONER

## 2017-05-24 PROCEDURE — 21400001 HC TELEMETRY ROOM

## 2017-05-24 PROCEDURE — 80053 COMPREHEN METABOLIC PANEL: CPT

## 2017-05-24 PROCEDURE — 25000003 PHARM REV CODE 250: Performed by: HOSPITALIST

## 2017-05-24 PROCEDURE — G8987 SELF CARE CURRENT STATUS: HCPCS | Mod: CM

## 2017-05-24 PROCEDURE — 80076 HEPATIC FUNCTION PANEL: CPT

## 2017-05-24 PROCEDURE — 83735 ASSAY OF MAGNESIUM: CPT

## 2017-05-24 PROCEDURE — 63600175 PHARM REV CODE 636 W HCPCS: Performed by: NURSE PRACTITIONER

## 2017-05-24 PROCEDURE — 25000242 PHARM REV CODE 250 ALT 637 W/ HCPCS: Performed by: NURSE PRACTITIONER

## 2017-05-24 PROCEDURE — 63600175 PHARM REV CODE 636 W HCPCS: Performed by: HOSPITALIST

## 2017-05-24 PROCEDURE — 99233 SBSQ HOSP IP/OBS HIGH 50: CPT | Mod: ,,, | Performed by: INTERNAL MEDICINE

## 2017-05-24 PROCEDURE — 92526 ORAL FUNCTION THERAPY: CPT

## 2017-05-24 PROCEDURE — C9113 INJ PANTOPRAZOLE SODIUM, VIA: HCPCS | Performed by: INTERNAL MEDICINE

## 2017-05-24 PROCEDURE — 97535 SELF CARE MNGMENT TRAINING: CPT

## 2017-05-24 PROCEDURE — G8988 SELF CARE GOAL STATUS: HCPCS | Mod: CJ

## 2017-05-24 PROCEDURE — 80048 BASIC METABOLIC PNL TOTAL CA: CPT

## 2017-05-24 PROCEDURE — 99232 SBSQ HOSP IP/OBS MODERATE 35: CPT | Mod: ,,, | Performed by: INTERNAL MEDICINE

## 2017-05-24 PROCEDURE — 63600175 PHARM REV CODE 636 W HCPCS: Performed by: INTERNAL MEDICINE

## 2017-05-24 PROCEDURE — 84100 ASSAY OF PHOSPHORUS: CPT

## 2017-05-24 PROCEDURE — 97166 OT EVAL MOD COMPLEX 45 MIN: CPT

## 2017-05-24 PROCEDURE — 97116 GAIT TRAINING THERAPY: CPT

## 2017-05-24 PROCEDURE — 36415 COLL VENOUS BLD VENIPUNCTURE: CPT

## 2017-05-24 RX ORDER — IPRATROPIUM BROMIDE AND ALBUTEROL SULFATE 2.5; .5 MG/3ML; MG/3ML
3 SOLUTION RESPIRATORY (INHALATION)
Status: DISCONTINUED | OUTPATIENT
Start: 2017-05-24 | End: 2017-05-27 | Stop reason: HOSPADM

## 2017-05-24 RX ORDER — FUROSEMIDE 10 MG/ML
40 INJECTION INTRAMUSCULAR; INTRAVENOUS ONCE
Status: COMPLETED | OUTPATIENT
Start: 2017-05-24 | End: 2017-05-24

## 2017-05-24 RX ORDER — FAMOTIDINE 20 MG/1
20 TABLET, FILM COATED ORAL 2 TIMES DAILY
Status: DISCONTINUED | OUTPATIENT
Start: 2017-05-25 | End: 2017-05-27 | Stop reason: HOSPADM

## 2017-05-24 RX ADMIN — IPRATROPIUM BROMIDE AND ALBUTEROL SULFATE 3 ML: .5; 3 SOLUTION RESPIRATORY (INHALATION) at 01:05

## 2017-05-24 RX ADMIN — ENOXAPARIN SODIUM 40 MG: 100 INJECTION SUBCUTANEOUS at 05:05

## 2017-05-24 RX ADMIN — IPRATROPIUM BROMIDE AND ALBUTEROL SULFATE 3 ML: .5; 3 SOLUTION RESPIRATORY (INHALATION) at 07:05

## 2017-05-24 RX ADMIN — ARFORMOTEROL TARTRATE 15 MCG: 15 SOLUTION RESPIRATORY (INHALATION) at 07:05

## 2017-05-24 RX ADMIN — ATORVASTATIN CALCIUM 40 MG: 40 TABLET, FILM COATED ORAL at 08:05

## 2017-05-24 RX ADMIN — LACTULOSE 15 G: 10 SOLUTION ORAL at 05:05

## 2017-05-24 RX ADMIN — IPRATROPIUM BROMIDE AND ALBUTEROL SULFATE 3 ML: .5; 3 SOLUTION RESPIRATORY (INHALATION) at 11:05

## 2017-05-24 RX ADMIN — CLOPIDOGREL BISULFATE 75 MG: 75 TABLET ORAL at 08:05

## 2017-05-24 RX ADMIN — CARVEDILOL 3.12 MG: 3.12 TABLET, FILM COATED ORAL at 08:05

## 2017-05-24 RX ADMIN — FUROSEMIDE 40 MG: 10 INJECTION, SOLUTION INTRAMUSCULAR; INTRAVENOUS at 01:05

## 2017-05-24 RX ADMIN — IPRATROPIUM BROMIDE AND ALBUTEROL SULFATE 3 ML: .5; 3 SOLUTION RESPIRATORY (INHALATION) at 03:05

## 2017-05-24 RX ADMIN — BUDESONIDE 0.5 MG: 0.5 SUSPENSION RESPIRATORY (INHALATION) at 07:05

## 2017-05-24 RX ADMIN — PANTOPRAZOLE SODIUM 40 MG: 40 INJECTION, POWDER, FOR SOLUTION INTRAVENOUS at 08:05

## 2017-05-24 RX ADMIN — ASPIRIN 81 MG: 81 TABLET, COATED ORAL at 08:05

## 2017-05-24 NOTE — PT/OT/SLP PROGRESS
Speech Language Pathology  Treatment    Jeniffer Fernandez   MRN: [id number]   Admitting Diagnosis: Acute on chronic respiratory failure with hypoxia    Diet recommendations: Solid Diet Level: Puree  Liquid Diet Level: Nectar Thick (no straws) Feed only when awake/alert, No straws, HOB to 90 degrees, Small bites/sips, Alternating bites/sips, 1 bite/sip at a time, Remain upright 30 minutes post meal, Avoid talking while eating, Assistance with meals and Assistance with thickening liquids    SLP Treatment Date: 05/24/17  Speech Start Time: 1100     Speech Stop Time: 1128     Speech Total (min): 28 min       TREATMENT BILLABLE MINUTES:  Treatment Swallowing Dysfunction 28    Has the patient been evaluated by SLP for swallowing? : Yes  Keep patient NPO?: No   General Precautions: Standard, aspiration, fall  Current Respiratory Status: nasal cannula       Subjective:  Pt seen sitting in a chair at bedside with her  present    Pain Rating:  (odynophagia)                   Objective:   Patient found with: blood pressure cuff, bowel management system, garcia catheter, NG tube, oxygen, peripheral IV, pulse ox (continuous), telemetry  Pt tolerated swallow assessment with ice chips, nectar thick liquids, and puree.  Pt required [place] assistance for feeding and verbal cueing for second swallows.  No overt signs of aspiration evident with intake of thick liquids or puree, however pt producing immediate coughing and wet vocal quality with ice/thin liquids.      Assessment:  Jeniffer Fernandez is a 57 y.o. female with a medical diagnosis of Acute on chronic respiratory failure with hypoxia and presents with oropharyngeal dysphagia.    Discharge recommendations: Discharge Facility/Level Of Care Needs: rehabilitation facility     Goals:    SLP Goals        Problem: SLP Goal    Goal Priority Disciplines Outcome   SLP Goal     SLP Ongoing (interventions implemented as appropriate)   Description:  1.  ST to complete an ongoing swallow  assessment with MBSS if warranted to establish po readiness  2. Pt will complete oral and pharyngeal ex's with min A for increased swallow strength and coordination  3. Pt will tolerate least restrictive diet without incidence                     Plan:   Patient to be seen Therapy Frequency: 3 x/week   Plan of Care expires: 05/30/17  Plan of Care reviewed with: patient, spouse  SLP Follow-up?: Yes              Kendra Arechiga CCC-SLP  05/24/2017

## 2017-05-24 NOTE — ASSESSMENT & PLAN NOTE
LFT's were normal on admission.  Check daily LFTs. Ammonia level was marginally elevated and responded to Lactulose. Liver Ultrasound showed a hepatic cyst and a mildly thickened gall bladder. Hepatitis panel is negative.

## 2017-05-24 NOTE — PLAN OF CARE
Problem: Patient Care Overview  Goal: Plan of Care Review  Outcome: Ongoing (interventions implemented as appropriate)  Plan of care review with patient and spouse , patient restless all night, trying to get out of the bed, Posy vest applied. Vital signs stable, afebrile, sinus rhythm /sinus tach on monitor. Good urine output, turn reposition Q 2hrs.

## 2017-05-24 NOTE — PROGRESS NOTES
Progress Note  Critical Care    Admit Date: 5/14/2017   LOS: 10 days     Follow-up For: Resp Failure and COPD     SUBJECTIVE:   Change over last 24 hours: Much more alert again today ambulated 10 yards with PT today.  Eyes more open and more interactive.  Still with diarrhea.  Improved on swallow eval today.     ROS:  Review of Systems   Constitutional: Negative for chills, fever and malaise/fatigue.   HENT: Negative for congestion.    Eyes: Negative for blurred vision.   Respiratory: Positive for cough. Negative for sputum production, shortness of breath and wheezing.    Cardiovascular: Negative for chest pain and leg swelling.   Gastrointestinal: Positive for diarrhea. Negative for abdominal pain, nausea and vomiting.   Genitourinary: Negative for dysuria.   Musculoskeletal: Negative for myalgias.   Skin: Negative for rash.   Neurological: Positive for weakness. Negative for dizziness, focal weakness and headaches.   Endo/Heme/Allergies: Does not bruise/bleed easily.   Psychiatric/Behavioral: Positive for depression. The patient is not nervous/anxious.        Continuous Infusions:   sodium chloride 0.9% 50 mL/hr at 05/24/17 0900     Review of patient's allergies indicates:   Allergen Reactions    Codeine Itching    Codeine phosphate      Itchy (skin)^       OBJECTIVE:     Vital Signs (Most Recent)  Temp: 98.1 °F (36.7 °C) (05/24/17 1105)  Pulse: 108 (05/24/17 1105)  Resp: 18 (05/24/17 1105)  BP: (!) 119/96 (05/24/17 1105)  SpO2: 100 % (05/24/17 1105)    Vital Signs Range (Last 24H):  Temp:  [97.9 °F (36.6 °C)-98.9 °F (37.2 °C)]   Pulse:  []   Resp:  [15-26]   BP: ()/()   SpO2:  [81 %-100 %]     I & O (Last 24H):  Intake/Output Summary (Last 24 hours) at 05/24/17 1138  Last data filed at 05/24/17 0900   Gross per 24 hour   Intake             2090 ml   Output             3190 ml   Net            -1100 ml       Ventilator Data (Last 24H):     Oxygen Concentration (%):  [3-30] 30    Physical  Exam:  Physical Exam   Constitutional: Vital signs are normal. She appears to not be writhing in pain, not malnourished and not jaundiced. Lethargic: but much improved. She appears unhealthy.  Non-toxic appearance. No distress. She is not intubated.   HENT:   Head: Normocephalic and atraumatic.   Nose:       Mouth/Throat: Mucous membranes are dry.       Eyes: EOM and lids are normal. Pupils are equal, round, and reactive to light.   Neck: Normal range of motion. Carotid bruit is not present. No tracheal deviation present.   Cardiovascular: Normal rate and regular rhythm.    Pulses:       Radial pulses are 2+ on the right side, and 2+ on the left side.        Dorsalis pedis pulses are 1+ on the right side, and 1+ on the left side.   Pulmonary/Chest: No accessory muscle usage. She is not intubated. No respiratory distress. She has decreased breath sounds. She has rales.   Abdominal: Soft. Normal appearance and bowel sounds are normal. She exhibits no distension. There is no tenderness.   Genitourinary:   Genitourinary Comments: Rodriguez    Musculoskeletal: Normal range of motion.   bilat hand edema   Lymphadenopathy:     She has no cervical adenopathy.     She has no axillary adenopathy.   Neurological: She is alert. Lethargic: but much improved.   Eyes open and follow commands.  Verbally responsive with clearer speech and less lethargic   Skin: Skin is warm and dry. Bruising (bilat hands and UEs) noted. She is not diaphoretic. No cyanosis.   Psychiatric: She exhibits a depressed mood. She exhibits disordered thought content, abnormal recent memory and abnormal remote memory. She has a flat affect.       Laboratory (Last 24H):  CMP:    Recent Labs  Lab 05/24/17  0845   CALCIUM 8.7   ALBUMIN 1.6*   PROT 3.1*      K 4.2   CO2 28      BUN 5*   CREATININE 0.6   ALKPHOS 41*   ALT 34   AST 19   BILITOT 0.3       Chest X-Ray:         Film and report reviewed:  Worsening peripheral interstitial opacities in the lower  lobes this has the appearance of interstitial edema.      ASSESSMENT/PLAN:     Problem   Acute On Chronic Respiratory Failure With Hypoxia   Delirium Due to Another Medical Condition, Acute, Hyperactive   Oropharyngeal Dysphagia   Physical Deconditioning   Copd Exacerbation   Chronic Systolic Heart Failure   Depression   Tobacco Abuse       PLAN:    1. Neuro: neuro monitoring and improving.  Avoid sedation and narcotics.   Neuro following.     2. Pulmonary: Encourage C&DB and OOB.  Cont Duonebs, Formoterol, Budesonide.  Hilda BiPAP at night only.  NT suction prn.     3. Cardiac: Cardiac monitoring and Cards following.  Planning LHC vs stress test once pulm and neuro status stable.  Cont statin, Plavix ASA and Coreg     4. Renal: Rodriguez in place, monitor I/Os      5. Infectious Disease: Follow fever curve     6. Hematology/Oncology: monitor for bleeding     7. Endocrine:  Monitor glucose stable     8. Fluids/Electrolytes/Nutrition/GI: Improved on repeat ST eval today start pureed diet.  Stop IVFs.  Remove NG     9. Musculoskeletal:  ROM.  PT and OT following     10. Pain Management: no issues      11. Discharge and Palliative Care:  LTAC vs home with home health.      Preventive Measures and Monitoring:   Stress Ulcer: PPI  Nutrition: start diet pureed  Glucose control: stable  Bowel prophylaxis: PRN Dulcolax and scheduled Lactulose  DVT prophylaxis: LMWH/SCDs  Hx CAD on B-Blocker: Coreg  Head of Bed/Reposition: Elevate HOB and turn Q1-2 hours    Early Mobility: OOB today  NG Day: #6 remove today  Rodriguez Day: #6  Code Status: Full    Counseling/Consultation: I have discussed the care of this patient in detail with multidisciplinary team during rounds, bedside nursing staff and Dr. Broussard and Dr. Sanchez    Will transfer to [facility] to follow from pulmonary standpoint.    POLLY [facility]-BC  [facility]

## 2017-05-24 NOTE — PLAN OF CARE
CM discussed SNF/IRF placement with spouse this am. Ownership disclosure form completed and signed. [id number] notified of referral, spoke to Manda. Manda to evaluate for IRF and SNF then submit to insurance for authorization.  1530 CM called to patient's room, spouse states he no longer wants [facility], now requesting [facility] if authorized for IRF, and [facility] if authorized for SNF; CM placed call to Briennavin with [phone], spoke to Micah, notified of referral, Patient  information via [facility] [employer]; CM placed call to Saida with [facility] [phone] notified of referral, Patient information via [employer]  1600: Dr. Suarez to CM office to discuss patient care and anticipated discharge in am. PASSR Level 1 completed and signed by MD. CM attempted to call for [facility], CM contact information taken by [facility] representative and awaiting return call.   1643 CM received call from hospitals, [facility] information given.  1653CM faxed PASSR [phone]; fax confirmation received @0427.

## 2017-05-24 NOTE — ASSESSMENT & PLAN NOTE
Most likely present when she was admitted given her history and risk factors.  Echo showed ejection fraction of 30%.  Cardiology on consult with recommendations.  Optimize medical management.  Suspect ischemic heart disease and anticipate LHC when medically stable. Heparin infusion has been discontinued.  Patient started on ASA, statin and Clopidogrel.

## 2017-05-24 NOTE — PROGRESS NOTES
Cardiology Progress Note        SUBJECTIVE:     History of Present Illness:  Patient is a 57 y.o. female presents with ACS, mildly elevated troponin and LVEF of 30 %. She is much more awake and following commands. Respiratory status is improving as well. Denies chest pain or shortness of breath. Vital signs and labs stable. Cough present.       OBJECTIVE:     Vital Signs (Most Recent)  Temp: 98.1 °F (36.7 °C) (05/24/17 1105)  Pulse: 97 (05/24/17 1534)  Resp: 20 (05/24/17 1534)  BP: 98/78 (05/24/17 1335)  SpO2: 96 % (05/24/17 1534)    Vital Signs Range (Last 24H):  Temp:  [97.9 °F (36.6 °C)-98.9 °F (37.2 °C)]   Pulse:  []   Resp:  [15-26]   BP: ()/()   SpO2:  [81 %-100 %]     Intake/Output last 3 shifts:  I/O last 3 completed shifts:  In: 3427.2 [I.V.:2007.2; NG/GT:1420]  Out: 4575 [Urine:4250; Stool:325]    Intake/Output this shift:  I/O this shift:  In: 200 [I.V.:200]  Out: 1500 [Urine:1500]    Review of patient's allergies indicates:   Allergen Reactions    Codeine Itching    Codeine phosphate      Itchy (skin)^       Current Facility-Administered Medications   Medication    acetaminophen tablet 650 mg    albuterol-ipratropium 2.5mg-0.5mg/3mL nebulizer solution 3 mL    aluminum-magnesium hydroxide-simethicone 200-200-20 mg/5 mL suspension 30 mL    arformoterol nebulizer solution 15 mcg    aspirin EC tablet 81 mg    atorvastatin tablet 40 mg    budesonide nebulizer solution 0.5 mg    carvedilol tablet 3.125 mg    clopidogrel tablet 75 mg    enoxaparin injection 40 mg    [START ON 5/25/2017] famotidine tablet 20 mg    haloperidol lactate injection 5 mg    ondansetron injection 4 mg     No current facility-administered medications on file prior to encounter.      Current Outpatient Prescriptions on File Prior to Encounter   Medication Sig    amitriptyline (ELAVIL) 50 MG tablet Take 1 tablet (50 mg total) by mouth every evening. (Patient taking differently: Take 25 mg by mouth 3  (three) times daily. )    lorazepam (ATIVAN) 0.5 MG tablet Take 0.5 mg by mouth every 6 (six) hours as needed for Anxiety.    montelukast (SINGULAIR) 10 mg tablet Take 10 mg by mouth every evening.    paroxetine (PAXIL) 20 MG tablet Take 40 mg by mouth every morning.     predniSONE (DELTASONE) 10 MG tablet Please take 20mg twice daily x 3 days, then take 10 mg twice daily x 2 days, then take 10 mg once daily x 2 days    ropinirole (REQUIP) 1 MG tablet Take 1 mg by mouth every evening.    b complex vitamins tablet Take 1 tablet by mouth once daily.    docusate sodium (COLACE) 100 MG capsule Take 1 capsule (100 mg total) by mouth 2 (two) times daily.    fluticasone (FLONASE) 50 mcg/actuation nasal spray 2 sprays by Each Nare route once daily.    mometasone-formoterol (DULERA) 200-5 mcg/actuation inhaler Inhale 2 puffs into the lungs 2 (two) times daily.    nicotine (NICODERM CQ) 21 mg/24 hr Place 1 patch onto the skin once daily.       Physical Exam:  General appearance: drowsy , appears stated age and cooperative  Head: Normocephalic, without obvious abnormality, atraumatic  Eyes:  conjunctivae/corneas clear. PERRL, EOM's intact. Fundi benign.  Nose: no discharge  Throat: normal findings: tongue midline and normal  Neck: no adenopathy, no carotid bruit, no JVD, supple, symmetrical, trachea midline and thyroid not enlarged, symmetric, no tenderness/mass/nodules  Back:  no skin lesions, erythema, or scars  Lungs: BBS clear and diminish posterior bases   Chest wall: no tenderness  Heart: regular rate and rhythm, S1, S2 normal, no murmur, click, rub or gallop  Abdomen: soft, non-tender; bowel sounds normal; no masses,  no organomegaly  Extremities: extremities normal, atraumatic, no cyanosis or edema  Pulses: 1+ and symmetric  Skin: Skin color, texture, turgor normal. No rashes or lesions  Neurologic: Grossly normal    Laboratory:  Chemistry:   Lab Results   Component Value Date     05/24/2017    K 4.2  05/24/2017     05/24/2017    CO2 28 05/24/2017    BUN 5 (L) 05/24/2017    CREATININE 0.6 05/24/2017    CALCIUM 8.7 05/24/2017     Cardiac Markers:   Lab Results   Component Value Date    TROPONINI 0.100 (H) 05/20/2017     Cardiac Markers (Last 3):   Lab Results   Component Value Date    TROPONINI 0.100 (H) 05/20/2017    TROPONINI 0.107 (H) 05/19/2017    TROPONINI 0.128 (H) 05/19/2017     CBC:   Lab Results   Component Value Date    WBC 9.55 05/23/2017    HGB 11.5 (L) 05/23/2017    HCT 36.4 (L) 05/23/2017     (H) 05/23/2017     05/23/2017     Lipids: No results found for: CHOL, TRIG, HDL, LDLDIRECT  Coagulation:   Lab Results   Component Value Date    INR 1.0 05/20/2017    APTT 37.1 (H) 05/23/2017       Diagnostic Results:  ECG: Reviewed  X-Ray: Reviewed  US: Reviewed  CT: Reviewed  Echo: Reviewed      ASSESSMENT/PLAN:     Patient Active Problem List   Diagnosis    Closed fracture of proximal end of left humerus    Fracture of proximal humerus    Pneumonia    Depression    Anxiety    Bipolar disorder    COPD, severe    Tobacco abuse    Abdominal pain    Lung infiltrate    COPD exacerbation    Acute on chronic respiratory failure with hypoxia    Delirium due to another medical condition, acute, hyperactive    LFT elevation    Chronic systolic heart failure    Physical deconditioning    Oropharyngeal dysphagia        Plan:   Will continue current medical management  Continue Plavix, ASA, Coreg and Statin   No ACE due to low blood pressure   Left Heart Cath when respiratory improves     Chart reviewed. Dr. Chadwick agrees with plan as outlined above.

## 2017-05-24 NOTE — PROGRESS NOTES
NEURO CRITICAL CARE PROGRESS NOTE    Jeniffer Fernandez   57 y.o. female  DATE 5/24/2017        Patient seen at the bedside, she is awake, alert and following commands. She denies headaches, no nausea, no vomiting, no dizziness, no diplopia, she is complaining of being tired and fatigued. She was ambulating in the [place] [address] with the help of PT/OT  No complications overnight    Past Medical History:   Diagnosis Date    Anxiety     COPD (chronic obstructive pulmonary disease)     Depression     Respiratory distress     SOB (shortness of breath)      Past Surgical History:   Procedure Laterality Date    COLONOSCOPY      TUBAL LIGATION       Family History   Problem Relation Age of Onset    COPD Mother     Cancer Father      brain    Aneurysm Brother     Suicide Son     No Known Problems Son     No Known Problems Son      Social History   Substance Use Topics    Smoking status: Current Every Day Smoker     Packs/day: 0.50     Years: 35.00     Types: Cigarettes    Smokeless tobacco: Not on file      Comment: decreasing amount of cigarettes    Alcohol use Yes      Comment: occassionally       Review of patient's allergies indicates:   Allergen Reactions    Codeine Itching    Codeine phosphate      Itchy (skin)^        Scheduled Meds:   albuterol-ipratropium 2.5mg-0.5mg/3mL  3 mL Nebulization Q6H    arformoterol  15 mcg Nebulization Q12H    aspirin  81 mg Oral Daily    atorvastatin  40 mg Oral Daily    budesonide  0.5 mg Nebulization Q12H    carvedilol  3.125 mg Oral BID    clopidogrel  75 mg Oral Daily    enoxaparin  40 mg Subcutaneous Daily    pantoprazole  40 mg Intravenous Daily     Continuous Infusions:   sodium chloride 0.9% 50 mL/hr at 05/24/17 0900     PRN Meds:acetaminophen, albuterol-ipratropium 2.5mg-0.5mg/3mL, aluminum-magnesium hydroxide-simethicone, haloperidol lactate, ondansetron    Review of Systems:  All the 14 ROS were reviewed and the pertinent ones are mentioned in the  HPI           OBJECTIVE:     Vital Signs (Most Recent)  Temp: 98.8 °F (37.1 °C) (05/24/17 0705)  Pulse: (!) 117 (05/24/17 0739)  Resp: 20 (05/24/17 0739)  BP: 117/84 (05/24/17 0705)  SpO2: 100 % (05/24/17 0739)     Vital Signs Range (Last 24H):  Temp:  [97.9 °F (36.6 °C)-98.9 °F (37.2 °C)]   Pulse:  []   Resp:  [15-26]   BP: ()/()   SpO2:  [81 %-100 %]     Physical Exam:  General:  Awake, alert and following commands, she has meaningful conversation. HEENT:   Acephalic, Atraumatic,  EOMI, PERRLA, no nystagmus, no ptosis, no lid lag, no neglect, no gaze palsy    Neck: supple, no JVD, no Carotid bruit, no torticollis,   Lungs: CTA,  No rhonchi, no rales  Heart: Regular Rate and rhythm, no murmurs, rubs and or gallops  Abdomen, Soft, non tender, non distended, no abdominal  bruit, bowel sounds present  Extremities: No edema,         NEURO    Mental Status:   Awake, alert   , following commands  Memory, Recent and Remote: Compromised  Mood: pleasant  Affect: pleasant  Behavior:appropriate  Attention and Concentration: intact  Hallucinations, Delusions and suicidal ideation: denies  Language: intact      SPEECH:   No aphasia, no Dysarthria,     CRANIAL NERVES:      II: visual acuity  Intact   II: visual fields Full to confrontation   II: pupils Equal, round, reactive to light   III,VII: ptosis None   III,IV,VI: extraocular muscles  Full ROM   V: mastication Normal   V: facial light touch sensation  Normal   V,VII: corneal reflex  Present   VII: facial muscle function - upper  Normal   VII: facial muscle function - lower Normal   VIII: hearing Not tested   IX: soft palate elevation  Normal   IX,X: gag reflex Present   XI: trapezius strength  Intact   XI: sternocleidomastoid strength Intact   XI: neck flexion strength  Intact   XII: tongue strength  Normal         MOTOR:Upper Extremities and Lower Extremities     Moves all the extremities antigravity    PRONATION and DRIFT:   negative  TONE:   intact      REFLEXES: Deep tendon reflexes tested:  Upper extremities:               biceps (C5, C6): 2               brachioradialis (C5, C6):2               triceps (C6, C7),0     Lower extremities:                knee or patellar (L2, 3, 4):1               ankle (S1, S2):1      Plantar responses: flexors b/l    SENSORY: intact to  PIN PRICK and TEMP,Soft TOUCH and VIBRATION   ROMBERG and  GAIT:   Ambulates with PT/OT, gets fatigued easily.    EXTRAPYRAMIDALS: none              Laboratory:  Lab Results   Component Value Date    WBC 9.55 05/23/2017    HGB 11.5 (L) 05/23/2017    HCT 36.4 (L) 05/23/2017     05/23/2017    ALT 34 05/24/2017    AST 19 05/24/2017     05/24/2017    K 4.2 05/24/2017     05/24/2017    CREATININE 0.6 05/24/2017    BUN 5 (L) 05/24/2017    CO2 28 05/24/2017    INR 1.0 05/20/2017       Diagnostic Results:CT scan films   ( All images reviewed Independently)   Imaging Results          X-Ray Chest 1 View (Final result)  Result time 05/24/17 08:48:54    Final result by Edison Shrestha MD (05/24/17 08:48:54)                 Impression:     Worsening peripheral interstitial opacities in the lower lobes this has the appearance of interstitial edema.              Electronically signed by: EDISON SHRESTHA MD  Date:     05/24/17  Time:    08:48              Narrative:    Exam: XR CHEST 1 VIEW    Clinical History: Shortness of breath. Acute respiratory failure    Findings:     Worsening peripheral lower lobe of reticular interstitial opacities.  The right upper lobe interstitial infiltrate is unchanged. Lung hyperinflation with emphysema. The cardiac silhouette is within normal limits.  NG tube in the stomach.                             X-Ray Chest 1 View (Final result)  Result time 05/22/17 08:09:28    Final result by Edison Shrestha MD (05/22/17 08:09:28)                 Impression:     As above.            Electronically signed by: EDISON SHRESTHA MD  Date:     05/22/17  Time:    08:09               Narrative:    Exam: XR CHEST 1 VIEW    Clinical History: Shortness of breath. SOB    Findings:     Spell and fainting interstitial infiltrate in the right upper lobe.  Reticular interstitial opacities appear increased in the periphery of the lower lobes. The cardiac silhouette is within normal limits.  No pneumothorax.  NG tube in the stomach.  Emphysema.                             X-Ray Chest 1 View (Final result)  Result time 05/21/17 08:03:02    Final result by Magno Salomon MD (05/21/17 08:03:02)                 Impression:     See above.      Electronically signed by: MAGNO SALOMON  Date:     05/21/17  Time:    08:03              Narrative:    Chest x-ray single view    Indication: Shortness of breath.    Findings: Comparison study 5/20/2017.  No change.                             US Abdomen Limited (Final result)  Result time 05/20/17 12:54:42    Final result by Hussein Simon MD (05/20/17 12:54:42)                 Impression:           1.  1.7 cm right hepatic lobe cyst.  2.  Mild gallbladder wall thickening, without gallstones, pericholecystic fluid or sonographic Newsome sign.  Chronic cholecystitis could cause this appearance.  3.  Otherwise, normal study.      Electronically signed by: HUSSEIN SIMON MD  Date:     05/20/17  Time:    12:54              Narrative:    Right quadrant ultrasound, color-flow and spectral doppler interrogation    History:    Acute encephalopathy with elevated Ammonia.  Suspect fibrosis/cirrhosis but no ascites..  Abnormal results of liver function studies.    Findings:   No comparison studies are available.  There is a 1.7 cm right hepatic lobe cyst. The liver is otherwise normal, and measures 13.5 cm. The gallbladder is normal.  Mild gallbladder wall thickening. Negative for sonographic Newsome's sign.  The common duct measures 4 mm. The right kidney measures 10.5 cm.  It is without focal solid or cystic masses, hydronephrosis, perinephric fluid collections or shadowing  stones. The visualized portions of the pancreas are normal.  The aorta and IVC are normal in caliber.  Hepatopedal flow is documented in the portal vein.  Negative for ascites.    Hepatorenal index is not recorded.                             X-Ray Chest 1 View (Final result)  Result time 05/20/17 09:15:27    Final result by David De La Cruz Jr., MD (05/20/17 09:15:27)                 Impression:     No significant interval change.      Electronically signed by: DAVID DE LA CRUZ  Date:     05/20/17  Time:    09:15              Narrative:    Exam: Portable chest radiograph    History:    Shortness of breath.    Comparison: 5/19/2017    Findings: Lungs are clear with prominent interstitium similar as before.  Enteric tube is in the stomach.  Cardiac silhouette and mediastinum within normal limits.  No effusion or pneumothorax.                             X-Ray Chest 1 View (Final result)  Result time 05/19/17 20:37:23    Final result by CHILANGO Rodriguez Sr., MD (05/19/17 20:37:23)                 Impression:        1.  There is a mild amount of interstitial opacities seen in both lungs with Kerley B-lines visualized bilaterally.  This is characteristic of pulmonary edema.  2.  An NG tube remains in place.    3.  There is surgical hardware in the proximal portion of the left humerus.        Electronically signed by: CHILANGO RODRIGUEZ MD  Date:     05/19/17  Time:    20:37              Narrative:    One-view chest x-ray    Clinical History: Hypoxia    Finding: Comparison was made to a prior examination performed on 5/18/2017.  An NG tube remains in place.  The size of the heart is normal.  There is a mild amount of interstitial opacities seen in both lungs with Kerley B-lines visualized bilaterally.  There is no pneumothorax.  The costophrenic angles are sharp.  There is surgical hardware in the proximal portion of the left humerus.                             X-Ray Chest 1 View (Final result)  Result time 05/18/17 11:49:18     Final result by James Caruso MD (05/18/17 11:49:18)                 Impression:      Please see above.      Electronically signed by: JAMES CARUSO MD  Date:     05/18/17  Time:    11:49              Narrative:    Exam: XR CHEST 1 VIEW,    Date:  05/18/17 11:04:25    History: NG tube placement    Comparison:  Earlier film from the same day.    Findings: Nasogastric tube has been placed with the distal tip well below the GE junction in good position.  Otherwise no change.                             CT Head Without Contrast (Final result)  Result time 05/18/17 10:46:24    Final result by James Caruso MD (05/18/17 10:46:24)                 Impression:         Negative noncontrast CT scan of the brain.         All CT scans at this facility use dose modulation, iterative reconstruction, and/or weight based dosing when appropriate to reduce radiation dose to as low as reasonably achievable.       Electronically signed by: JAMES CARUSO MD  Date:     05/18/17  Time:    10:46              Narrative:    CT HEAD WITHOUT CONTRAST    Date: 05/18/17 10:36:40    Clinical indication: Altered mental status.  Nonresponsive patient.     Technique:  Standard noncontrast CT scan of the brain. No previous for comparison.     Findings:  The ventricles are nondilated. Gray and white matter structures reveal normal attenuation. No hemorrhage, mass effect or edema is identified.     The skull and skull base are unremarkable.                             X-Ray Chest 1 View (Final result)  Result time 05/18/17 08:02:46    Final result by James Caruso MD (05/18/17 08:02:46)                 Impression:      Please see above.      Electronically signed by: JAMES CARUSO MD  Date:     05/18/17  Time:    08:02              Narrative:    Exam: XR CHEST 1 VIEW,    Date:  05/18/17 05:34:00    History: SOB    Comparison:  05/14/2017.    Findings: Heart size is normal.  No pneumothorax.  Reticulonodular lung disease  is slightly improved.  Postoperative changes left humeral neck.                             CTA Chest Non-Coronary (Final result)  Result time 05/14/17 10:56:46    Final result by David Caal MD (05/14/17 10:56:46)                 Impression:      Negative for pulmonary embolus.    Patchy right upper lobe and right lower lobe interstitial infiltrate/pneumonia with some nodularity at the dependent right lower lobe.  No pleural effusion.  Short term imaging followup after appropriate treatment is recommended.    Mediastinal adenopathy, perhaps reactive.  Attention on followup.    Emphysema.    Moderate stenosis proximal celiac artery.          All CT scans at this facility use dose modulation, iterative reconstruction, and/or weight based dosing when appropriate to reduce radiation dose to as low as reasonably achievable.      Electronically signed by: DAVID CAAL MD  Date:     05/14/17  Time:    10:56              Narrative:    EXAM: CTA CHEST NON CORONARY    CLINICAL HISTORY: shortness of breath    TECHNIQUE: CT angiogram performed of the chest following IV contrast administration to evaluate for pulmonary emboli. Multiplanar MIP reformations performed.    COMPARISON STUDIES: Chest x-ray May 14, 2017    FINDINGS:  No evidence of pulmonary embolus.  Minimal aortic atherosclerosis without aneurysm or dissection.    Marked emphysematous changes in the lungs with apical and paraseptal bullous changes.  Patchy interstitial infiltrate posterior right upper lobe and posterior right lower lobe.  Some areas of nodularity in the dependent right lung base within this area of interstitial thickening with largest area of nodularity measuring 1.9 cm.    Numerous enlarged mediastinal lymph nodes are noted with a representative subcarinal lymph node measuring 1.4 x 1.3 cm.    Right hepatic lobe cyst.  Moderate narrowing proximal segment celiac artery.                             X-Ray Chest 1 View (Final result)  Result time  05/14/17 09:11:48    Final result by David Caal MD (05/14/17 09:11:48)                 Impression:      Diffuse chronic interstitial prominence with improving right upper lobe infiltrate with a small amount of residual right upper lobe interstitial markings remaining from prior chest x-ray.  Continued short-term followup recommended.      Electronically signed by: DAVID CAAL MD  Date:     05/14/17  Time:    09:11              Narrative:    EXAM: XR CHEST 1 VIEW    CLINICAL HISTORY: Shortness of breath.    COMPARISON STUDIES: December 22, 2016    FINDINGS:  The cardiomediastinal silhouette is within normal limits.  Diffuse mild interstitial prominence throughout the lungs with minimal asymmetric markings in the right upper lobe, though improved from prior exam.  Surgical plate proximal left humerus.                              05/24/2017    Assessment and Recommendations:    Patient with COPD exacerbation.  Had change in mental status,     Differential diagnosis:  1) Acute toxic encephalopathy  2) Hypoxia  3) Acute delirium  4) Acute Metabolic Encephalopathy      Recommend:  Refrain from CNS modulating medications in acute setting  Medical Management    Family Meeting: D/W  regarding depression, anxiety and pain medications  Also highly recommended regarding smoking  ( Active and Passive exposure)  Discussed about Stroke and dementia        Will sign off    If needed please call                Juan Daniel Duff MD., Ph.D., MS

## 2017-05-24 NOTE — PT/OT/SLP EVAL
Occupational Therapy  Evaluation    Jeniffer Fernandez   MRN: [id number]   Admitting Diagnosis: Acute on chronic respiratory failure with hypoxia    OT Date of Treatment: 17   OT Start Time: 1010  OT Stop Time: 1040  OT Total Time (min): 30 min    Billable Minutes:  Evaluation 10 MINUTES  Self Care/Home Management 15 MINUTES       Diagnosis: Acute on chronic respiratory failure with hypoxia   DEBILITY AND GENERALIZED WEAKNESS    Past Medical History:   Diagnosis Date    Anxiety     COPD (chronic obstructive pulmonary disease)     Depression     Respiratory distress     SOB (shortness of breath)       Past Surgical History:   Procedure Laterality Date    COLONOSCOPY      TUBAL LIGATION         Referring physician: DR. GONZALEZ  Date referred to OT: 17    General Precautions: Standard, fall, aspiration  Orthopedic Precautions: N/A  Braces:            Patient History:  Living Environment  Lives With: spouse  Living Arrangements: house  Home Layout: Able to live on 1st floor  Number of Stairs to Enter Home: 2  Stair Railings at Home: none  Living Environment Comment: pt reports (i) with adl's, t/f's and mobility PLOF  Equipment Currently Used at Home: oxygen (prn)    Prior level of function:   Bed Mobility/Transfers: independent  Grooming: independent  Bathing: independent  Upper Body Dressing: independent  Lower Body Dressing: independent  Toileting: independent  Home Management Skills: independent  Homemaking Responsibilities: Yes  Driving License: Yes  Mode of Transportation: Car  Occupation: Retired     Dominant hand: right    Subjective:  Communicated with NURSE PEREZ AND [facility] CHART REVIEW prior to session.    Chief Complaint: DEBILITY AND GENERALIZED WEAKNESS  Patient/Family stated goals:     Pain Ratin/10                   Objective:  Patient found with: telemetry, blood pressure cuff, bowel management system, pulse ox (continuous), garcia catheter    Cognitive Exam:  Oriented to: Person, Place, Time  and Situation  Follows Commands/attention: Follows one-step commands  Communication: clear/fluent  Memory:  Impaired LTM, IMPAIRED STM  Safety awareness/insight to disability: impaired  Coping skills/emotional control: Appropriate to situation    Visual/perceptual:  Intact    Physical Exam:  Postural examination/scapula alignment: Rounded shoulder and Head forward  MILDLY  Skin integrity: Thin  Edema: None noted     Sensation:   Intact    Upper Extremity Range of Motion:  Right Upper Extremity: WFL  Left Upper Extremity: WFL    Upper Extremity Strength:  Right Upper Extremity: MMT: 3/5 GROSSLY  Left Upper Extremity: MMT: 3/5 GROSSLY   Strength: MMT: 3/5 GROSSLY    Fine motor coordination:   Intact    Gross motor coordination: WFL    Functional Mobility:  Bed Mobility:       Transfers:  Sit <> Stand Assistance: Minimum Assistance  Sit <> Stand Assistive Device: Rolling Walker  Bed <> Chair Technique: Stand Pivot  Bed <> Chair Transfer Assistance: Minimum Assistance  Bed <> Chair Assistive Device: Rolling Walker    Functional Ambulation: PT AMBULATED WITH MOD A WITH RW, SLOW PACE, VC FOR INSTRUCTIONS/ SAFETY AWAREMESS AND NAVIGATION OF RW AT TIMES    Activities of Daily Living:     Feeding adaptive equipment: NA  UE Dressing Level of Assistance: Maximum assistance  UE adaptive equipment: NA  LE Dressing Level of Assistance: Minimum assistance  LE adaptive equipment: NA        Toileting Where Assessed: Other (Comment)  Toileting Level of Assistance: Total assistance (garcia and rectal tube placement)        Bathing adaptive equipment: NA    Balance:   Static Sit: FAIR+: Able to take MINIMAL challenges from all directions  Dynamic Sit: FAIR: Cannot move trunk without losing balance  Static Stand: POOR+: Needs MINIMAL assist to maintain  Dynamic stand: POOR: N/A    Therapeutic Activities and Exercises:      AM-PAC 6 CLICK ADL  How much help from another person does this patient currently need?  1 = Unable,  "Total/Dependent Assistance  2 = A lot, Maximum/Moderate Assistance  3 = A little, Minimum/Contact Guard/Supervision  4 = None, Modified [place]/Independent         AM-PAC Raw Score CMS "G-Code Modifier Level of Impairment Assistance   6 % Total / Unable   7 - 9 CM 80 - 100% Maximal Assist   10 - 14 CL 60 - 80% Moderate Assist   15 - 19 CK 40 - 60% Moderate Assist   20 - 22 CJ 20 - 40% Minimal Assist   23 CI 1-20% SBA / CGA   24 CH 0% Independent/ Mod I       Patient left up in chair with all lines intact, call button in reach, NURSE ANA notified and SPOUSE present    Assessment:  Jeniffer Fernandez is a 57 y.o. female with a medical diagnosis of Acute on chronic respiratory failure with hypoxia and presents with DEBILITY AND GENERALIZED WEAKNESS. PT WILL BENEFIT FROM SKILLED O.T..    Rehab identified problem list/impairments: Rehab identified problem list/impairments: weakness, impaired self care skills, impaired balance, decreased safety awareness, impaired endurance, impaired functional mobilty, gait instability, decreased lower extremity function, impaired cognition    Rehab potential is good.    Activity tolerance: Good    Discharge recommendations: Discharge Facility/Level Of Care Needs: rehabilitation facility     Barriers to discharge: Barriers to Discharge: None    Equipment recommendations: walker, rolling     GOALS:    Occupational Therapy Goals        Problem: Occupational Therapy Goal    Goal Priority Disciplines Outcome Interventions   Occupational Therapy Goal     OT, PT/OT     Description:  OT GOALS TO BE MET BY 5-31-17  1. SBA WITH UE DRESSING  2. SBA WITH LE DRESSING  3. PT WILL TOLERATE 1 SET X 15 REPS B UE ROM EXERCISE WITH MIN RESISTANCE                    PLAN:  Patient to be seen 3 x/week to address the above listed problems via self-care/home management, therapeutic activities, therapeutic exercises  Plan of Care expires: 05/31/17  Plan of Care reviewed with: patient, spouse    OT " G-codes  Functional Assessment Tool Used: FIM-ADL  Score: 2  Functional Limitation: Self care  Self Care Current Status (): NAIL  Self Care Goal Status (): LALY Koehler OT  05/24/2017

## 2017-05-24 NOTE — PLAN OF CARE
Problem: SLP Goal  Goal: SLP Goal  1.  ST to complete an ongoing swallow assessment with MBSS if warranted to establish po readiness  2. Pt will complete oral and pharyngeal ex's with min A for increased swallow strength and coordination  3. Pt will tolerate least restrictive diet without incidence   Outcome: Ongoing (interventions implemented as appropriate)  Pt with increased swallow safety.  ST recommending puree with nectar thick liquids.

## 2017-05-24 NOTE — PT/OT/SLP PROGRESS
Physical Therapy  Treatment    Jeniffer Fernandez   MRN: [id number]   Admitting Diagnosis: Acute on chronic respiratory failure with hypoxia    PT Received On: 05/24/17  PT Start Time: 1036     PT Stop Time: 1100    PT Total Time (min): 24 min       Billable Minutes:  Gait [id number] and Therapeutic Activity 10    Treatment Type: Treatment  PT/PTA: PT             General Precautions: Standard, fall, aspiration  Orthopedic Precautions: N/A   Braces:           Subjective:  Communicated with NURSE MCKENZIE AND EPIC CHART REVIEW  prior to session.   PT AGREED TO TX     Pain Rating: 3/10        Location: throat     Pain Rating Post-Intervention: 3/10    Objective:   Patient found with: peripheral IV, bowel management system, garcia catheter, pulse ox (continuous), telemetry, oxygen    Functional Mobility:  Bed Mobility:        Transfers:  Sit <> Stand Assistance: Minimum Assistance  Sit <> Stand Assistive Device: Rolling Walker  Bed <> Chair Technique: Stand Pivot  Bed <> Chair Assistance: Minimum Assistance  Bed <> Chair Assistive Device: Rolling Walker    Gait:   Gait Distance: PT GT TRAINED WITH RW X 80' WITH MIN A AND UNSTEADY GT.  Assistance 1: Minimum assistance  Gait Assistive Device: Rolling walker  Gait Pattern: swing-to gait  Gait Deviation(s): decreased catarino    Therapeutic Activities and Exercises:  PT AGREED TO TX. PT CONFUSED DURING TX HOWEVER PARTICIPATED WITH MIN A AND INC TIME FOR GT. PT RETURNED TO  T/F TO CHAIR WITH MIN A AND LEFT SEATED WITH  AND ALL NEEDS MET.      AM-PAC 6 CLICK MOBILITY  How much help from another person does this patient currently need?   1 = Unable, Total/Dependent Assistance  2 = A lot, Maximum/Moderate Assistance  3 = A little, Minimum/Contact Guard/Supervision  4 = None, Modified [place]/Independent    Turning over in bed (including adjusting bedclothes, sheets and blankets)?: 1 (NT)  Sitting down on and standing up from a chair with arms (e.g., wheelchair, bedside  commode, etc.): 3  Moving from lying on back to sitting on the side of the bed?: 1 (NT)  Moving to and from a bed to a chair (including a wheelchair)?: 3  Need to walk in hospital room?: 3  Climbing 3-5 steps with a railing?: 1  Total Score: 12    AM-PAC Raw Score CMS G-Code Modifier Level of Impairment Assistance   6 % Total / Unable   7 - 9 CM 80 - 100% Maximal Assist   10 - 14 CL 60 - 80% Moderate Assist   15 - 19 CK 40 - 60% Moderate Assist   20 - 22 CJ 20 - 40% Minimal Assist   23 CI 1-20% SBA / CGA   24 CH 0% Independent/ Mod I     Patient left up in chair with call button in reach.    Assessment:  PT PROGRESSING WITH GT AND GROSS FUNC MOBILITY. PT WILL CONT TO BENEFIT FROM P.T. TO ADDRESS IMPAIRMENTS LIST AND ASSIST IN RETURNING TO PLOF.     Rehab identified problem list/impairments: Rehab identified problem list/impairments: weakness, impaired endurance, gait instability, impaired functional mobilty, impaired self care skills, impaired balance, decreased safety awareness, pain, decreased lower extremity function, decreased upper extremity function    Rehab potential is good.    Activity tolerance: Fair    Discharge recommendations: Discharge Facility/Level Of Care Needs: rehabilitation facility     Barriers to discharge: Barriers to Discharge: None    Equipment recommendations: Equipment Needed After Discharge: walker, rolling     GOALS:    Physical Therapy Goals     Not on file                PLAN:    Patient to be seen  (AT LEAST 5 OF 7 DAYS A WEEK)  to address the above listed problems via gait training, therapeutic activities, therapeutic exercises  Plan of Care expires: 05/30/17  Plan of Care reviewed with: patient, spouse         Milka Jil, PT  05/24/2017

## 2017-05-24 NOTE — PLAN OF CARE
Problem: Patient Care Overview  Goal: Plan of Care Review  Outcome: Ongoing (interventions implemented as appropriate)  PT PROGRESSING WITH GT X 80' WITH LORETTA BISHOP AND RW

## 2017-05-24 NOTE — PLAN OF CARE
Problem: Patient Care Overview  Goal: Plan of Care Review  Outcome: Ongoing (interventions implemented as appropriate)  Patient transferred from ICU this afternoon. Report taken from Caitlyn. Patient resting well.  at bedside. Patient continues to be confused. Patient free from falls and injury. Will continue to monitor.

## 2017-05-24 NOTE — SUBJECTIVE & OBJECTIVE
Interval History: Failed a swallow evaluation yesterday. Denied any acute complaints today.     Review of Systems   Constitutional: Negative for activity change and appetite change.   HENT: Negative for congestion and dental problem.    Respiratory: Negative for cough and chest tightness.    Cardiovascular: Negative for chest pain and leg swelling.   Gastrointestinal: Negative for abdominal distention and abdominal pain.   Endocrine: Negative for cold intolerance.   Neurological: Negative for dizziness.     Objective:     Vital Signs (Most Recent):  Temp: 98.8 °F (37.1 °C) (05/24/17 0705)  Pulse: (!) 117 (05/24/17 0739)  Resp: 20 (05/24/17 0739)  BP: 117/84 (05/24/17 0705)  SpO2: 100 % (05/24/17 0739) Vital Signs (24h Range):  Temp:  [97.9 °F (36.6 °C)-98.9 °F (37.2 °C)] 98.8 °F (37.1 °C)  Pulse:  [] 117  Resp:  [15-26] 20  SpO2:  [81 %-100 %] 100 %  BP: ()/() 117/84     Weight: 49.1 kg (108 lb 3.9 oz)  Body mass index is 18.58 kg/m².    Intake/Output Summary (Last 24 hours) at 05/24/17 0954  Last data filed at 05/24/17 0900   Gross per 24 hour   Intake             2562 ml   Output             3415 ml   Net             -853 ml      Physical Exam   Constitutional: She appears well-developed and well-nourished. No distress.   HENT:   Head: Normocephalic and atraumatic.   Mouth/Throat: Oropharynx is clear and moist.   Eyes: Conjunctivae and EOM are normal. Pupils are equal, round, and reactive to light.   Neck: Neck supple. No JVD present. No thyromegaly present.   Cardiovascular: Normal rate and regular rhythm.  Exam reveals no gallop and no friction rub.    No murmur heard.  Pulmonary/Chest: She has wheezes. She has no rales.   Coarse crackles bilaterally.  Expiratory wheezes and generally reduced breath sounds.   Abdominal: Soft. Bowel sounds are normal. She exhibits no distension. There is no tenderness. There is no rebound and no guarding.   Musculoskeletal: Normal range of motion. She exhibits  no edema or deformity.   Lymphadenopathy:     She has no cervical adenopathy.   Neurological: She has normal reflexes.   Somnolent   Skin: Skin is warm and dry. No rash noted.   Nursing note and vitals reviewed.    Significant Labs:   A1C: No results for input(s): HGBA1C in the last 4320 hours.  ABGs: No results for input(s): PH, PCO2, HCO3, POCSATURATED, BE in the last 48 hours.  Bilirubin:   Recent Labs  Lab 05/18/17 0442 05/19/17 0409 05/20/17  0400 05/21/17  0418 05/24/17  0730   BILIDIR  --  0.1  --   --   --    BILITOT 0.3 0.3 0.3 0.3 0.3     Blood Culture: No results for input(s): LABBLOO in the last 48 hours.  BMP:   Recent Labs  Lab 05/23/17 0445 05/24/17  0845   *  < > 104     < > 139   K 4.2  < > 4.2     < > 104   CO2 29  < > 28   BUN 8  < > 5*   CREATININE 0.6  < > 0.6   CALCIUM 8.4*  < > 8.7   MG 2.1  --   --    < > = values in this interval not displayed.  CBC:   Recent Labs  Lab 05/23/17 0445   WBC 9.55   HGB 11.5*   HCT 36.4*        CMP:   Recent Labs  Lab 05/23/17 0445 05/24/17  0730 05/24/17  0845    144 139   K 4.2 2.5* 4.2    122* 104   CO2 29 19* 28   * 71 104   BUN 8 4* 5*   CREATININE 0.6 0.3* 0.6   CALCIUM 8.4* 5.0* 8.7   PROT  --  3.2*  --    ALBUMIN  --  1.6*  --    BILITOT  --  0.3  --    ALKPHOS  --  42*  --    AST  --  14  --    ALT  --  33  --    ANIONGAP 5* 3* 7*   EGFRNONAA >60 >60 >60     Cardiac Markers: No results for input(s): CKMB, MYOGLOBIN, BNP, TROPISTAT in the last 48 hours.  Coagulation:   Recent Labs  Lab 05/23/17 0445   APTT 37.1*     Lactic Acid: No results for input(s): LACTATE in the last 48 hours.  Lipase: No results for input(s): LIPASE in the last 48 hours.  Lipid Panel: No results for input(s): CHOL, HDL, LDLCALC, TRIG, CHOLHDL in the last 48 hours.    Significant Imaging:   Imaging Results          X-Ray Chest 1 View (Final result)  Result time 05/24/17 08:48:54    Final result by Dontrell Shrestha MD (05/24/17  08:48:54)                 Impression:     Worsening peripheral interstitial opacities in the lower lobes this has the appearance of interstitial edema.              Electronically signed by: EDISON OLIVIER MD  Date:     05/24/17  Time:    08:48              Narrative:    Exam: XR CHEST 1 VIEW    Clinical History: Shortness of breath. Acute respiratory failure    Findings:     Worsening peripheral lower lobe of reticular interstitial opacities.  The right upper lobe interstitial infiltrate is unchanged. Lung hyperinflation with emphysema. The cardiac silhouette is within normal limits.  NG tube in the stomach.                             X-Ray Chest 1 View (Final result)  Result time 05/22/17 08:09:28    Final result by Edison Olivier MD (05/22/17 08:09:28)                 Impression:     As above.            Electronically signed by: EDISON OLIVIER MD  Date:     05/22/17  Time:    08:09              Narrative:    Exam: XR CHEST 1 VIEW    Clinical History: Shortness of breath. SOB    Findings:     Spell and fainting interstitial infiltrate in the right upper lobe.  Reticular interstitial opacities appear increased in the periphery of the lower lobes. The cardiac silhouette is within normal limits.  No pneumothorax.  NG tube in the stomach.  Emphysema.                             X-Ray Chest 1 View (Final result)  Result time 05/21/17 08:03:02    Final result by Eric Salomon MD (05/21/17 08:03:02)                 Impression:     See above.      Electronically signed by: ERIC SALOMON  Date:     05/21/17  Time:    08:03              Narrative:    Chest x-ray single view    Indication: Shortness of breath.    Findings: Comparison study 5/20/2017.  No change.                             US Abdomen Limited (Final result)  Result time 05/20/17 12:54:42    Final result by Hussein Simon MD (05/20/17 12:54:42)                 Impression:           1.  1.7 cm right hepatic lobe cyst.  2.  Mild gallbladder wall thickening,  without gallstones, pericholecystic fluid or sonographic Newsome sign.  Chronic cholecystitis could cause this appearance.  3.  Otherwise, normal study.      Electronically signed by: LAKISHA RITCHIE MD  Date:     05/20/17  Time:    12:54              Narrative:    Right quadrant ultrasound, color-flow and spectral doppler interrogation    History:    Acute encephalopathy with elevated Ammonia.  Suspect fibrosis/cirrhosis but no ascites..  Abnormal results of liver function studies.    Findings:   No comparison studies are available.  There is a 1.7 cm right hepatic lobe cyst. The liver is otherwise normal, and measures 13.5 cm. The gallbladder is normal.  Mild gallbladder wall thickening. Negative for sonographic Newsome's sign.  The common duct measures 4 mm. The right kidney measures 10.5 cm.  It is without focal solid or cystic masses, hydronephrosis, perinephric fluid collections or shadowing stones. The visualized portions of the pancreas are normal.  The aorta and IVC are normal in caliber.  Hepatopedal flow is documented in the portal vein.  Negative for ascites.    Hepatorenal index is not recorded.                             X-Ray Chest 1 View (Final result)  Result time 05/20/17 09:15:27    Final result by David Elliott Jr., MD (05/20/17 09:15:27)                 Impression:     No significant interval change.      Electronically signed by: DAVID ELLIOTT  Date:     05/20/17  Time:    09:15              Narrative:    Exam: Portable chest radiograph    History:    Shortness of breath.    Comparison: 5/19/2017    Findings: Lungs are clear with prominent interstitium similar as before.  Enteric tube is in the stomach.  Cardiac silhouette and mediastinum within normal limits.  No effusion or pneumothorax.                             X-Ray Chest 1 View (Final result)  Result time 05/19/17 20:37:23    Final result by CHILANGO Rodriguez Sr., MD (05/19/17 20:37:23)                 Impression:        1.  There is  a mild amount of interstitial opacities seen in both lungs with Kerley B-lines visualized bilaterally.  This is characteristic of pulmonary edema.  2.  An NG tube remains in place.    3.  There is surgical hardware in the proximal portion of the left humerus.        Electronically signed by: CHILANGO RODRIGUEZ MD  Date:     05/19/17  Time:    20:37              Narrative:    One-view chest x-ray    Clinical History: Hypoxia    Finding: Comparison was made to a prior examination performed on 5/18/2017.  An NG tube remains in place.  The size of the heart is normal.  There is a mild amount of interstitial opacities seen in both lungs with Kerley B-lines visualized bilaterally.  There is no pneumothorax.  The costophrenic angles are sharp.  There is surgical hardware in the proximal portion of the left humerus.                             X-Ray Chest 1 View (Final result)  Result time 05/18/17 11:49:18    Final result by James Caruso MD (05/18/17 11:49:18)                 Impression:      Please see above.      Electronically signed by: JAMES CARUSO MD  Date:     05/18/17  Time:    11:49              Narrative:    Exam: XR CHEST 1 VIEW,    Date:  05/18/17 11:04:25    History: NG tube placement    Comparison:  Earlier film from the same day.    Findings: Nasogastric tube has been placed with the distal tip well below the GE junction in good position.  Otherwise no change.                             CT Head Without Contrast (Final result)  Result time 05/18/17 10:46:24    Final result by James Caruso MD (05/18/17 10:46:24)                 Impression:         Negative noncontrast CT scan of the brain.         All CT scans at this facility use dose modulation, iterative reconstruction, and/or weight based dosing when appropriate to reduce radiation dose to as low as reasonably achievable.       Electronically signed by: JAMES CARUSO MD  Date:     05/18/17  Time:    10:46              Narrative:    CT HEAD  WITHOUT CONTRAST    Date: 05/18/17 10:36:40    Clinical indication: Altered mental status.  Nonresponsive patient.     Technique:  Standard noncontrast CT scan of the brain. No previous for comparison.     Findings:  The ventricles are nondilated. Gray and white matter structures reveal normal attenuation. No hemorrhage, mass effect or edema is identified.     The skull and skull base are unremarkable.                             X-Ray Chest 1 View (Final result)  Result time 05/18/17 08:02:46    Final result by James Caruso MD (05/18/17 08:02:46)                 Impression:      Please see above.      Electronically signed by: JAMES CARUSO MD  Date:     05/18/17  Time:    08:02              Narrative:    Exam: XR CHEST 1 VIEW,    Date:  05/18/17 05:34:00    History: SOB    Comparison:  05/14/2017.    Findings: Heart size is normal.  No pneumothorax.  Reticulonodular lung disease is slightly improved.  Postoperative changes left humeral neck.                             CTA Chest Non-Coronary (Final result)  Result time 05/14/17 10:56:46    Final result by Dustin Caal MD (05/14/17 10:56:46)                 Impression:      Negative for pulmonary embolus.    Patchy right upper lobe and right lower lobe interstitial infiltrate/pneumonia with some nodularity at the dependent right lower lobe.  No pleural effusion.  Short term imaging followup after appropriate treatment is recommended.    Mediastinal adenopathy, perhaps reactive.  Attention on followup.    Emphysema.    Moderate stenosis proximal celiac artery.          All CT scans at this facility use dose modulation, iterative reconstruction, and/or weight based dosing when appropriate to reduce radiation dose to as low as reasonably achievable.      Electronically signed by: DUSTIN CAAL MD  Date:     05/14/17  Time:    10:56              Narrative:    EXAM: CTA CHEST NON CORONARY    CLINICAL HISTORY: shortness of breath    TECHNIQUE: CT angiogram  performed of the chest following IV contrast administration to evaluate for pulmonary emboli. Multiplanar MIP reformations performed.    COMPARISON STUDIES: Chest x-ray May 14, 2017    FINDINGS:  No evidence of pulmonary embolus.  Minimal aortic atherosclerosis without aneurysm or dissection.    Marked emphysematous changes in the lungs with apical and paraseptal bullous changes.  Patchy interstitial infiltrate posterior right upper lobe and posterior right lower lobe.  Some areas of nodularity in the dependent right lung base within this area of interstitial thickening with largest area of nodularity measuring 1.9 cm.    Numerous enlarged mediastinal lymph nodes are noted with a representative subcarinal lymph node measuring 1.4 x 1.3 cm.    Right hepatic lobe cyst.  Moderate narrowing proximal segment celiac artery.                             X-Ray Chest 1 View (Final result)  Result time 05/14/17 09:11:48    Final result by David Caal MD (05/14/17 09:11:48)                 Impression:      Diffuse chronic interstitial prominence with improving right upper lobe infiltrate with a small amount of residual right upper lobe interstitial markings remaining from prior chest x-ray.  Continued short-term followup recommended.      Electronically signed by: DAVID CAAL MD  Date:     05/14/17  Time:    09:11              Narrative:    EXAM: XR CHEST 1 VIEW    CLINICAL HISTORY: Shortness of breath.    COMPARISON STUDIES: December 22, 2016    FINDINGS:  The cardiomediastinal silhouette is within normal limits.  Diffuse mild interstitial prominence throughout the lungs with minimal asymmetric markings in the right upper lobe, though improved from prior exam.  Surgical plate proximal left humerus.

## 2017-05-25 LAB
AMMONIA PLAS-SCNC: 24 UMOL/L
ANION GAP SERPL CALC-SCNC: 10 MMOL/L
BASOPHILS # BLD AUTO: 0.01 K/UL
BASOPHILS NFR BLD: 0.1 %
BUN SERPL-MCNC: 6 MG/DL
CALCIUM SERPL-MCNC: 9.4 MG/DL
CHLORIDE SERPL-SCNC: 101 MMOL/L
CO2 SERPL-SCNC: 32 MMOL/L
CREAT SERPL-MCNC: 0.6 MG/DL
DIASTOLIC DYSFUNCTION: NO
DIFFERENTIAL METHOD: ABNORMAL
EOSINOPHIL # BLD AUTO: 0.1 K/UL
EOSINOPHIL NFR BLD: 1.3 %
ERYTHROCYTE [DISTWIDTH] IN BLOOD BY AUTOMATED COUNT: 15.2 %
EST. GFR  (AFRICAN AMERICAN): >60 ML/MIN/1.73 M^2
EST. GFR  (NON AFRICAN AMERICAN): >60 ML/MIN/1.73 M^2
GLUCOSE SERPL-MCNC: 95 MG/DL
HCT VFR BLD AUTO: 38.1 %
HGB BLD-MCNC: 12.2 G/DL
LYMPHOCYTES # BLD AUTO: 1.4 K/UL
LYMPHOCYTES NFR BLD: 14 %
MAGNESIUM SERPL-MCNC: 2.1 MG/DL
MCH RBC QN AUTO: 32 PG
MCHC RBC AUTO-ENTMCNC: 32 %
MCV RBC AUTO: 100 FL
MONOCYTES # BLD AUTO: 1.2 K/UL
MONOCYTES NFR BLD: 11.7 %
NEUTROPHILS # BLD AUTO: 7.2 K/UL
NEUTROPHILS NFR BLD: 72.9 %
PLATELET # BLD AUTO: 231 K/UL
PMV BLD AUTO: 12.7 FL
POTASSIUM SERPL-SCNC: 4.1 MMOL/L
RBC # BLD AUTO: 3.81 M/UL
SODIUM SERPL-SCNC: 143 MMOL/L
WBC # BLD AUTO: 9.87 K/UL

## 2017-05-25 PROCEDURE — 85025 COMPLETE CBC W/AUTO DIFF WBC: CPT

## 2017-05-25 PROCEDURE — 25000003 PHARM REV CODE 250: Performed by: HOSPITALIST

## 2017-05-25 PROCEDURE — 78452 HT MUSCLE IMAGE SPECT MULT: CPT | Mod: 26,,, | Performed by: INTERNAL MEDICINE

## 2017-05-25 PROCEDURE — 94640 AIRWAY INHALATION TREATMENT: CPT

## 2017-05-25 PROCEDURE — 25000242 PHARM REV CODE 250 ALT 637 W/ HCPCS: Performed by: NURSE PRACTITIONER

## 2017-05-25 PROCEDURE — 99232 SBSQ HOSP IP/OBS MODERATE 35: CPT | Mod: ,,, | Performed by: INTERNAL MEDICINE

## 2017-05-25 PROCEDURE — 93016 CV STRESS TEST SUPVJ ONLY: CPT | Mod: ,,, | Performed by: INTERNAL MEDICINE

## 2017-05-25 PROCEDURE — 25000003 PHARM REV CODE 250: Performed by: NURSE PRACTITIONER

## 2017-05-25 PROCEDURE — 63600175 PHARM REV CODE 636 W HCPCS: Performed by: NURSE PRACTITIONER

## 2017-05-25 PROCEDURE — 93018 CV STRESS TEST I&R ONLY: CPT | Mod: ,,, | Performed by: INTERNAL MEDICINE

## 2017-05-25 PROCEDURE — 97803 MED NUTRITION INDIV SUBSEQ: CPT

## 2017-05-25 PROCEDURE — 94761 N-INVAS EAR/PLS OXIMETRY MLT: CPT

## 2017-05-25 PROCEDURE — 80048 BASIC METABOLIC PNL TOTAL CA: CPT

## 2017-05-25 PROCEDURE — 82140 ASSAY OF AMMONIA: CPT

## 2017-05-25 PROCEDURE — 25000003 PHARM REV CODE 250: Performed by: INTERNAL MEDICINE

## 2017-05-25 PROCEDURE — 92526 ORAL FUNCTION THERAPY: CPT

## 2017-05-25 PROCEDURE — 83735 ASSAY OF MAGNESIUM: CPT

## 2017-05-25 PROCEDURE — 63600175 PHARM REV CODE 636 W HCPCS: Performed by: HOSPITALIST

## 2017-05-25 PROCEDURE — 94660 CPAP INITIATION&MGMT: CPT

## 2017-05-25 PROCEDURE — 99231 SBSQ HOSP IP/OBS SF/LOW 25: CPT | Mod: 25,,, | Performed by: INTERNAL MEDICINE

## 2017-05-25 PROCEDURE — 97116 GAIT TRAINING THERAPY: CPT

## 2017-05-25 PROCEDURE — 27000221 HC OXYGEN, UP TO 24 HOURS

## 2017-05-25 PROCEDURE — 21400001 HC TELEMETRY ROOM

## 2017-05-25 RX ORDER — REGADENOSON 0.08 MG/ML
0.4 INJECTION, SOLUTION INTRAVENOUS ONCE
Status: COMPLETED | OUTPATIENT
Start: 2017-05-25 | End: 2017-05-25

## 2017-05-25 RX ADMIN — CARVEDILOL 3.12 MG: 3.12 TABLET, FILM COATED ORAL at 10:05

## 2017-05-25 RX ADMIN — BUDESONIDE 0.5 MG: 0.5 SUSPENSION RESPIRATORY (INHALATION) at 07:05

## 2017-05-25 RX ADMIN — FAMOTIDINE 20 MG: 20 TABLET ORAL at 08:05

## 2017-05-25 RX ADMIN — FAMOTIDINE 20 MG: 20 TABLET ORAL at 10:05

## 2017-05-25 RX ADMIN — IPRATROPIUM BROMIDE AND ALBUTEROL SULFATE 3 ML: .5; 3 SOLUTION RESPIRATORY (INHALATION) at 01:05

## 2017-05-25 RX ADMIN — ENOXAPARIN SODIUM 40 MG: 100 INJECTION SUBCUTANEOUS at 05:05

## 2017-05-25 RX ADMIN — REGADENOSON 0.4 MG: 0.08 INJECTION, SOLUTION INTRAVENOUS at 12:05

## 2017-05-25 RX ADMIN — IPRATROPIUM BROMIDE AND ALBUTEROL SULFATE 3 ML: .5; 3 SOLUTION RESPIRATORY (INHALATION) at 07:05

## 2017-05-25 RX ADMIN — ATORVASTATIN CALCIUM 40 MG: 40 TABLET, FILM COATED ORAL at 10:05

## 2017-05-25 RX ADMIN — ARFORMOTEROL TARTRATE 15 MCG: 15 SOLUTION RESPIRATORY (INHALATION) at 07:05

## 2017-05-25 RX ADMIN — CLOPIDOGREL BISULFATE 75 MG: 75 TABLET ORAL at 10:05

## 2017-05-25 RX ADMIN — ASPIRIN 81 MG: 81 TABLET, COATED ORAL at 10:05

## 2017-05-25 RX ADMIN — CARVEDILOL 3.12 MG: 3.12 TABLET, FILM COATED ORAL at 08:05

## 2017-05-25 NOTE — PLAN OF CARE
Problem: SLP Goal  Goal: SLP Goal  1.  ST to complete an ongoing swallow assessment with MBSS if warranted to establish po readiness  2. Pt will complete oral and pharyngeal ex's with min A for increased swallow strength and coordination  3. Pt will tolerate least restrictive diet without incidence   Outcome: Ongoing (interventions implemented as appropriate)  Pt with increased swallow strength and safety today.  ST upgraded diet to dental soft with chopped meats and thin liquids.

## 2017-05-25 NOTE — CONSULTS
[facility] -   Adult Nutrition  Consult Note    SUMMARY     Recommendations  Recommendation/Intervention: 1. Continue with oral diet     2. Consider adding Boost Plus 1 can BID     3. Consider once daily multivitamin  Goals: Intake of % of meals  Nutrition Goal Status: new  Communication of RD Recs: discussed on rounds    Reason for Assessment  Reason for Assessment: RD follow-up  Diagnosis:  (Pneumonia)  Relevent Medical History: Anxiety, Depression, Bipolar, SOB, Respiratory Distress   Interdisciplinary Rounds: attended  General Information Comments:  5/18/17 - Patient developed AMS last night and was transfered to ICU.  Patient with reported drinking 2 glasses of wine each night, volume unknown.  5/22/17 - Patient on bipap with bolus tubefeed for nutrition support due to continued AMS. Discussed RD recommendations this AM. Continue Nutren 1.5 bolus 250ml every 4 hours from 8am to 8pm for total volume of 1000ml per 24 hours.  5/25/17 - Patient with improved mental status, moved out of ICU to telemetry. Was on puree diet but upgraded to Dental Soft this AM.    Nutrition Discharge Planning: Home on oral diet consistency per SLP    Nutrition Prescription Ordered  Current Diet Order: Dental Soft  Current Nutrition Support Formula Ordered: Discontinued  Current Nutrition Support Rate Ordered: 0 (ml)  Current Nutrition Support Frequency Ordered: n/a    Evaluation of Received Nutrients/Fluid Intake  Enteral Calories (kcal): 0  Enteral Protein (gm): 0  Enteral (Free Water) Fluid (mL): 0     Nutrition Risk Screen  Nutrition Risk Screen: no indicators present    Nutrition/Diet History  Food Preferences: none reported including cultural or Anglican  Meal/Snack Patterns: unable to obtain    Labs/Tests/Procedures/Meds  Pertinent Labs Reviewed: reviewed  Pertinent Labs Comments: BUN 5, Alb 1.6  Pertinent Medications Reviewed: reviewed    Physical Findings  Overall Physical Appearance:  (thin)  Tubes:   (discontinued)  Oral/Mouth Cavity: tooh/teeth broken, tooth/teeth missing  Skin: pressure ulcer(s) (Stage I Buttocks)    Anthropometrics  Height (inches): 64.02 in  Weight Method: Bed Scale  Weight (kg): 49.1 kg  Ideal Body Weight (IBW), Female: 120.1 lb  % Ideal Body Weight, Female (lb): 90.13 lb  BMI (kg/m2): 18.57  BMI Grade: 18.5-24.9 - normal    Estimated/Assessed Needs  Weight Used For Calorie Calculations: 46 kg (101 lb 6.6 oz)   Height (cm): 162.6 cm  Energy Need Method: Kcal/kg (35-40)  35 kcal/kg (kcal): 1610 and 40 kcal/kg (kcal): 1840   Weight Used For Protein Calculations: 46 kg (101 lb 6.6 oz)  0.8 gm Protein (gm): 36.88 and 1.0 gm Protein (gm): 46.1  Fluid Need Method: RDA Method (1ml/itto or as needed)    Assessment and Plan  Delirium due to another medical condition, acute, hyperactive    Nutrition Problem:   Other: Inadequate oral food and beverage intake    Etiology/Related to:   AMS    As evidenced by:  NPO due to acute AMS    Treatment Strategy:   1. Enteral nutrition support    Nutrition Diagnosis Status:   Resolved (patient on oral diet with improved mental status)          Monitor and Evaluation  Food and Nutrient Intake: food and beverage intake  Food and Nutrient Adminstration: diet order (oral supplement)  Physical Activity and Function: nutrition-related ADLs and IADLs  Anthropometric Measurements: weight  Biochemical Data, Medical Tests and Procedures: electrolyte and renal panel, glucose/endocrine profile  Nutrition-Focused Physical Findings: overall appearance, skin    Nutrition Follow-Up  RD Follow-up?: Yes (2x weekly)

## 2017-05-25 NOTE — PT/OT/SLP PROGRESS
Physical Therapy  Treatment    Jeniffer Fernandez   MRN: [id number]   Admitting Diagnosis: Acute on chronic respiratory failure with hypoxia    PT Received On: 05/25/17  PT Start Time: 1100     PT Stop Time: 1115    PT Total Time (min): 15 min       Billable Minutes:  Gait [id number]    Treatment Type: Treatment  PT/PTA: PT       General Precautions: Standard, fall, respiratory  Orthopedic Precautions: N/A   Braces: N/A    Subjective:  Communicated with NURSE LEVINE prior to session.  Pain/Comfort  Pain Rating 1: 0/10    Objective:   Patient found with: telemetry, oxygen    Functional Mobility:  Bed Mobility:   Rolling/Turning to Left: Stand by assistance  Rolling/Turning Right: Stand by assistance  Scooting/Bridging: Stand by Assistance  Supine to Sit: Contact Guard Assistance    Transfers:  Sit <> Stand Assistance: Minimum Assistance  Sit <> Stand Assistive Device: Rolling Walker  Bed <> Chair Technique: Stand Pivot  Bed <> Chair Assistance: Minimum Assistance  Bed <> Chair Assistive Device: Rolling Walker    Gait:   Gait Distance: PT AMB 15' WITH RW AND RENEE TO W/C FOR STRESS TEST, PT WITH UNSTEADY NARROW STEPS, CUES FOR UPRIGHT POSTURE AND RW SAFETY  Assistance 1: Minimum assistance  Gait Assistive Device: Rolling walker  Gait Pattern: swing-through gait  Gait Deviation(s): decreased catarino, decreased step length, decreased toe-to-floor clearance    Balance:   Static Sit: FAIR  Dynamic Sit: FAIR  Static Stand: POOR+  Dynamic stand:  POOR+    Therapeutic Activities and Exercises:    AM-PAC 6 CLICK MOBILITY  How much help from another person does this patient currently need?   1 = Unable, Total/Dependent Assistance  2 = A lot, Maximum/Moderate Assistance  3 = A little, Minimum/Contact Guard/Supervision  4 = None, Modified [place]/Independent    Turning over in bed (including adjusting bedclothes, sheets and blankets)?: 4  Sitting down on and standing up from a chair with arms (e.g., wheelchair, bedside commode,  etc.): 3  Moving from lying on back to sitting on the side of the bed?: 3  Moving to and from a bed to a chair (including a wheelchair)?: 3  Need to walk in hospital room?: 3  Climbing 3-5 steps with a railing?: 1  Total Score: 17    AM-PAC Raw Score CMS G-Code Modifier Level of Impairment Assistance   6 % Total / Unable   7 - 9 CM 80 - 100% Maximal Assist   10 - 14 CL 60 - 80% Moderate Assist   15 - 19 CK 40 - 60% Moderate Assist   20 - 22 CJ 20 - 40% Minimal Assist   23 CI 1-20% SBA / CGA   24 CH 0% Independent/ Mod I     Patient left W/C WITH TECH with all lines intact, NURSE notified and RT TECH present.    Assessment:  Jeniffer Fernandez is a 57 y.o. female with a medical diagnosis of Acute on chronic respiratory failure with hypoxia   PT WILL BENEFIT FROM CONT. SKILLED P.T. TO ADDRESS IMPAIRMENTS    Rehab identified problem list/impairments: Rehab identified problem list/impairments: weakness, impaired endurance, impaired functional mobilty, impaired balance, decreased safety awareness, gait instability    Rehab potential is good.    Activity tolerance: Good    Discharge recommendations: Discharge Facility/Level Of Care Needs: rehabilitation facility, nursing facility, skilled     Barriers to discharge: Barriers to Discharge: None    Equipment recommendations: Equipment Needed After Discharge: walker, rolling     GOALS:    Physical Therapy Goals        Problem: Physical Therapy Goal    Goal Priority Disciplines Outcome Goal Variances Interventions   Physical Therapy Goal     PT/OT, PT      Description:  LTG'S TO BE MET IN 7 DAYS (5-30-17)  1. PT WILL REQUIRE SBA FOR BED MOBILITY  2. PT WILL REQUIRE SBA FOR TF'S  3. PT WILL ' WITH RW AND CGA  4. PT WILL TOLERATE BLE THEREX X 20 REPS AROM  5. PT WILL DEMO F+ DYNAMIC BALANCE DURING GAIT                  PLAN:    Patient to be seen 5 x/week  to address the above listed problems via gait training, therapeutic activities, therapeutic exercises  Plan of  Care expires: 05/30/17  Plan of Care reviewed with: patient, daughter    PT ENCOURAGED TO CALL FOR ASSISTANCE WITH ALL NEEDS DUE TO FALL RISK STATUS, PT AGREEABLE    Elizabeth Leon, PT  05/25/2017

## 2017-05-25 NOTE — PROGRESS NOTES
Progress Note  Pulmonology    Admit Date: 5/14/2017   LOS: 11 days     SUBJECTIVE:     Follow-up For:   COPD, respiratory failure with metabolic encephalopathy: GINO  Now resolved  At baseline  Await [facility] for +ve trop  NC oxygen    Continuous Infusions:   Scheduled Meds:   albuterol-ipratropium 2.5mg-0.5mg/3mL  3 mL Nebulization Q6H WAKE    arformoterol  15 mcg Nebulization Q12H    aspirin  81 mg Oral Daily    atorvastatin  40 mg Oral Daily    budesonide  0.5 mg Nebulization Q12H    carvedilol  3.125 mg Oral BID    clopidogrel  75 mg Oral Daily    enoxaparin  40 mg Subcutaneous Daily    famotidine  20 mg Oral BID       Review of Systems:  Constitutional: no fever or chills  Respiratory: no cough or shortness of breath  Cardiovascular: no chest pain or palpitations    OBJECTIVE:     Vital Signs Range (Last 24H):  Temp:  [97.6 °F (36.4 °C)-99 °F (37.2 °C)]   Pulse:  []   Resp:  [17-22]   BP: ()/()   SpO2:  [92 %-100 %]     I & O (Last 24H):  Intake/Output Summary (Last 24 hours) at 05/25/17 0901  Last data filed at 05/24/17 1400   Gross per 24 hour   Intake              100 ml   Output             1225 ml   Net            -1125 ml     Physical Exam:  General: no distress, well developed, well nourished, fatigued  Neck: no jugular venous distention and no adenopathy  Lungs:  clear to auscultation bilaterally, normal respiratory effort, normal percussion bilaterally and diminished breath sounds bilaterally  Chest Wall: no tenderness  Heart: regular rate and rhythm and no murmur  Abdomen: soft, non-tender non-distended; bowel sounds normal  Extremities: no cyanosis or edema, or clubbing  Neurologic: alert, oriented, thought content appropriate    Laboratory:  CBC:   Recent Labs  Lab 05/23/17  0445   WBC 9.55   RBC 3.58*   HGB 11.5*   HCT 36.4*      *   MCH 32.1*   MCHC 31.6*     CMP:   Recent Labs  Lab 05/24/17  0845      CALCIUM 8.7   ALBUMIN 1.6*   PROT 3.1*      K  4.2   CO2 28      BUN 5*   CREATININE 0.6   ALKPHOS 41*   ALT 34   AST 19   BILITOT 0.3       ASSESSMENT/PLAN:     Problem   Copd Exacerbation   Acute On Chronic Respiratory Failure With Hypoxia   Delirium Due to Another Medical Condition, Acute, Hyperactive   Tobacco Abuse       Plan:   OSCAR garcia DC fecal management  Okay for discharge  Continue bronchodilators  OSCAR Haldol  Sign off  Thank you for the courtesy of participating in the care of this patient    Angel Broussard MD

## 2017-05-25 NOTE — PLAN OF CARE
Problem: Chronic Obstructive Pulmonary Disease (Adult)  Goal: Signs and Symptoms of Listed Potential Problems Will be Absent, Minimized or Managed (Chronic Obstructive Pulmonary Disease)  Signs and symptoms of listed potential problems will be absent, minimized or managed by discharge/transition of care (reference Chronic Obstructive Pulmonary Disease (Adult) CPG).   Outcome: Ongoing (interventions implemented as appropriate)  o2 sat on nc 3l/m=97%; tolerates txs well; pt wearing bipap at night.

## 2017-05-25 NOTE — PT/OT/SLP PROGRESS
Speech Language Pathology      Jeniffer Fernandez  MRN: [id number]    Patient not seen today secondary to  (pt currently in radiology for a nuclear stress test). Will follow-up next visit.    Kendra Arechiga CCC-SLP

## 2017-05-25 NOTE — PLAN OF CARE
Problem: Patient Care Overview  Goal: Plan of Care Review  Outcome: Ongoing (interventions implemented as appropriate)  Recommendation/Intervention: 1. Continue with oral diet     2. Consider adding Boost Plus 1 can BID     3. Consider once daily multivitamin  Goals: Intake of % of meals  Nutrition Goal Status: new  Communication of RD Recs: discussed on rounds

## 2017-05-25 NOTE — PT/OT/SLP PROGRESS
Occupational Therapy      Jeniffer Fernandez  MRN: [id number]    Patient not seen today secondary to pt in procedure (stress test) will attempt tx session on later date.    Tamiko Koehler OT   11:00  5/25/2017

## 2017-05-25 NOTE — PROGRESS NOTES
Cardiology Progress Note        SUBJECTIVE:     History of Present Illness:  Pt is sitting on the bed, oriented and alert. C/o double vision.  No chest pain, dyspnea and dizziness,       OBJECTIVE:     Vital Signs (Most Recent)  Temp: 97.9 °F (36.6 °C) (05/25/17 1710)  Pulse: 88 (05/25/17 1710)  Resp: 18 (05/25/17 1710)  BP: 102/63 (05/25/17 1710)  SpO2: (!) 94 % (05/25/17 1710)    Vital Signs Range (Last 24H):  Temp:  [97.6 °F (36.4 °C)-99 °F (37.2 °C)]   Pulse:  []   Resp:  [18-22]   BP: ()/(63-83)   SpO2:  [92 %-100 %]     Intake/Output last 3 shifts:  I/O last 3 completed shifts:  In: 1220 [I.V.:850; NG/GT:370]  Out: 3240 [Urine:3140; Stool:100]    Intake/Output this shift:  I/O this shift:  In: -   Out: 250 [Urine:250]    Review of patient's allergies indicates:   Allergen Reactions    Codeine Itching    Codeine phosphate      Itchy (skin)^       Current Facility-Administered Medications   Medication    acetaminophen tablet 650 mg    albuterol-ipratropium 2.5mg-0.5mg/3mL nebulizer solution 3 mL    aluminum-magnesium hydroxide-simethicone 200-200-20 mg/5 mL suspension 30 mL    arformoterol nebulizer solution 15 mcg    aspirin EC tablet 81 mg    atorvastatin tablet 40 mg    budesonide nebulizer solution 0.5 mg    carvedilol tablet 3.125 mg    clopidogrel tablet 75 mg    enoxaparin injection 40 mg    famotidine tablet 20 mg    ondansetron injection 4 mg     No current facility-administered medications on file prior to encounter.      Current Outpatient Prescriptions on File Prior to Encounter   Medication Sig    amitriptyline (ELAVIL) 50 MG tablet Take 1 tablet (50 mg total) by mouth every evening. (Patient taking differently: Take 25 mg by mouth 3 (three) times daily. )    lorazepam (ATIVAN) 0.5 MG tablet Take 0.5 mg by mouth every 6 (six) hours as needed for Anxiety.    montelukast (SINGULAIR) 10 mg tablet Take 10 mg by mouth every evening.    paroxetine (PAXIL) 20 MG tablet Take 40  mg by mouth every morning.     predniSONE (DELTASONE) 10 MG tablet Please take 20mg twice daily x 3 days, then take 10 mg twice daily x 2 days, then take 10 mg once daily x 2 days    ropinirole (REQUIP) 1 MG tablet Take 1 mg by mouth every evening.    b complex vitamins tablet Take 1 tablet by mouth once daily.    docusate sodium (COLACE) 100 MG capsule Take 1 capsule (100 mg total) by mouth 2 (two) times daily.    fluticasone (FLONASE) 50 mcg/actuation nasal spray 2 sprays by Each Nare route once daily.    mometasone-formoterol (DULERA) 200-5 mcg/actuation inhaler Inhale 2 puffs into the lungs 2 (two) times daily.    nicotine (NICODERM CQ) 21 mg/24 hr Place 1 patch onto the skin once daily.       Physical Exam:  General appearance: alert, appears stated age and cooperative  Head: Normocephalic, without obvious abnormality, atraumatic  Eyes:  conjunctivae/corneas clear. PERRL, EOM's intact. Fundi benign.  Nose: no discharge  Throat: normal findings: tongue midline and normal  Neck: no adenopathy, no carotid bruit, no JVD, supple, symmetrical, trachea midline and thyroid not enlarged, symmetric, no tenderness/mass/nodules  Back:  no skin lesions, erythema, or scars  Lungs:  clear to auscultation bilaterally  Chest wall: no tenderness  Heart: regular rate and rhythm, S1, S2 normal, no murmur, click, rub or gallop  Abdomen: soft, non-tender; bowel sounds normal; no masses,  no organomegaly  Extremities: extremities normal, atraumatic, no cyanosis or edema  Pulses: 2+ and symmetric  Skin: Skin color, texture, turgor normal. No rashes or lesions  Neurologic: Grossly normal    Laboratory:  Chemistry:   Lab Results   Component Value Date     05/25/2017    K 4.1 05/25/2017     05/25/2017    CO2 32 (H) 05/25/2017    BUN 6 05/25/2017    CREATININE 0.6 05/25/2017    CALCIUM 9.4 05/25/2017     Cardiac Markers:   Lab Results   Component Value Date    TROPONINI 0.100 (H) 05/20/2017     Cardiac Markers (Last 3):    Lab Results   Component Value Date    TROPONINI 0.100 (H) 05/20/2017    TROPONINI 0.107 (H) 05/19/2017    TROPONINI 0.128 (H) 05/19/2017     CBC:   Lab Results   Component Value Date    WBC 9.87 05/25/2017    HGB 12.2 05/25/2017    HCT 38.1 05/25/2017     (H) 05/25/2017     05/25/2017     Lipids: No results found for: CHOL, TRIG, HDL, LDLDIRECT  Coagulation:   Lab Results   Component Value Date    INR 1.0 05/20/2017    APTT 37.1 (H) 05/23/2017       Diagnostic Results:  ECG: Reviewed  X-Ray: Reviewed  US: Reviewed  CT: Reviewed  Echo: Reviewed      ASSESSMENT/PLAN:     Patient Active Problem List   Diagnosis    Closed fracture of proximal end of left humerus    Fracture of proximal humerus    Pneumonia    Depression    Anxiety    Bipolar disorder    COPD, severe    Tobacco abuse    Abdominal pain    Lung infiltrate    COPD exacerbation    Acute on chronic respiratory failure with hypoxia    Delirium due to another medical condition, acute, hyperactive    LFT elevation    Chronic systolic heart failure    Physical deconditioning    Oropharyngeal dysphagia      NSTEMI/ACS  Had nuclear stress test today, anterior small to moderate sized infarct with damon ischemia. EF 25%.  CHF compensated  AMS resolved.  Question h/o alcohol abuse  COPD   Plan: continue ASA, liptior 40 mg daily, coreg and Plavix.  Add Lisinopril 2.5 mg daily if tolerated  F/u as op.

## 2017-05-25 NOTE — PHYSICIAN QUERY
PT Name: Jeniffer Fernandez  MR #: [id number]    Physician Query Form -Present on Admission (POA) Diagnosis Clarification     CDS/: Vivien Law               Contact information: [phone]    This form is a permanent document in the medical record.     Query Date: May 25, 2017    By submitting this query, we are merely seeking further clarification of documentation. Please utilize your independent clinical judgment when addressing the question(s) below.       The Medical record contains the following:    Diagnosis      Supporting Clinical Information   Location in Medical Record   Metabolic encephalopathy           She was better the following morning but became more confused and agitated in the evening and combative with  staff.       Last night she was agitated and needed extra sedative to calm her down. Morning nurse reported that for last 3 nights, she showed agitations , confusion and needed sedatives to calm her down.    Follow-up For:   COPD, respiratory failure with metabolic encephalopathy: GINO  Now resolved  At baseline Neuro progress note 5-19     HM Progress note 5-18                    Neuro progress note 5-20                Pulmonology progress note 5-25         Doctor, Please specify Present On Admission (POA) status of _Metabolic encephalopathy_.    [  ] Present on Admission   [X  ] Not Present on Admission  [  ] Clinically undetermined

## 2017-05-25 NOTE — NURSING
"Pt sitting up in bed states she is seeing double, "I should've said something sooner". Post pharm stress test. Pt does not know onset.   "

## 2017-05-25 NOTE — NURSING
Rodriguez catheter and fecal management system removed without incident per verbal order from Dr. Broussard. Patient tolerated well. Pt and family member at bedside verbalized understanding to call for assistance for toileting.

## 2017-05-25 NOTE — PLAN OF CARE
Problem: Patient Care Overview  Goal: Plan of Care Review  Outcome: Ongoing (interventions implemented as appropriate)  Patient is sinus tach/sinus rhythm on the monitor. Pt has some bruising to hands/wrists bilaterally from repeatedly hitting the side rails. Side rails have been padded. Patient suffers from hallucinations but seems to be alert at this time. Have not witnessed hallucinations or self harm this shift. Pt has remained free from falls. Pt is resting quietly, will continue to monitor.

## 2017-05-25 NOTE — PT/OT/SLP PROGRESS
Speech Language Pathology  Treatment    Jeniffer Fernandez   MRN: [id number]   Admitting Diagnosis: Acute on chronic respiratory failure with hypoxia    Diet recommendations: Solid Diet Level: Chopped meat (dental soft with chopped meats)  Liquid Diet Level: Thin Feed only when awake/alert, HOB to 90 degrees, Small bites/sips, 1 bite/sip at a time and Remain upright 30 minutes post meal    SLP Treatment Date: 05/25/17  Speech Start Time: 1230     Speech Stop Time: 1245     Speech Total (min): 15 min       TREATMENT BILLABLE MINUTES:  Treatment Swallowing Dysfunction 15    Has the patient been evaluated by SLP for swallowing? : Yes  Keep patient NPO?: No   General Precautions: Standard, aspiration, fall  Current Respiratory Status: nasal cannula       Subjective:  Pt was seen reclined in the bed eating lunch.  Pt's family at bedside.  Pt's cognitive status has improved.    Pain/Comfort  Pain Rating 1: 0/10  Pain Rating Post-Intervention 1: 0/10    Objective:   Patient found with: telemetry, oxygen, peripheral IV  ST provided education to pt and family on importance of safe swallow strategies especially sitting upright during meals.  Swallow was assessed with soft solids and liquids with no overt signs of aspiration.  Pt's swallowing improved and ST recommends a po diet of dental soft with chopped meats and thin liquids.      Assessment:  Jeniffer Fernandez is a 57 y.o. female with a medical diagnosis of Acute on chronic respiratory failure with hypoxia and presents with increased swallow safety.      Discharge recommendations: Discharge Facility/Level Of Care Needs: rehabilitation facility     Goals:    SLP Goals        Problem: SLP Goal    Goal Priority Disciplines Outcome   SLP Goal     SLP Ongoing (interventions implemented as appropriate)   Description:  1.  ST to complete an ongoing swallow assessment with MBSS if warranted to establish po readiness  2. Pt will complete oral and pharyngeal ex's with min A for increased  swallow strength and coordination  3. Pt will tolerate least restrictive diet without incidence                     Plan:   Patient to be seen Therapy Frequency: 2 x/week   Plan of Care expires: 05/30/17  Plan of Care reviewed with: patient, family  SLP Follow-up?: Yes              Kendra Arechiga CCC-SLP  05/25/2017

## 2017-05-25 NOTE — PLAN OF CARE
Problem: Patient Care Overview  Goal: Plan of Care Review  Outcome: Ongoing (interventions implemented as appropriate)  CGA FOR BED MOBILITY, RENEE FOR TF AND GAIT USING RW

## 2017-05-26 PROBLEM — H53.2 DIPLOPIA: Status: ACTIVE | Noted: 2017-05-26

## 2017-05-26 LAB
ALBUMIN SERPL BCP-MCNC: 2.6 G/DL
ANION GAP SERPL CALC-SCNC: 10 MMOL/L
BUN SERPL-MCNC: 9 MG/DL
CALCIUM SERPL-MCNC: 9 MG/DL
CHLORIDE SERPL-SCNC: 103 MMOL/L
CO2 SERPL-SCNC: 27 MMOL/L
CREAT SERPL-MCNC: 0.7 MG/DL
EST. GFR  (AFRICAN AMERICAN): >60 ML/MIN/1.73 M^2
EST. GFR  (NON AFRICAN AMERICAN): >60 ML/MIN/1.73 M^2
GLUCOSE SERPL-MCNC: 107 MG/DL
PHOSPHATE SERPL-MCNC: 3.7 MG/DL
POTASSIUM SERPL-SCNC: 4.4 MMOL/L
SODIUM SERPL-SCNC: 140 MMOL/L

## 2017-05-26 PROCEDURE — 25000003 PHARM REV CODE 250: Performed by: INTERNAL MEDICINE

## 2017-05-26 PROCEDURE — 99231 SBSQ HOSP IP/OBS SF/LOW 25: CPT | Mod: ,,, | Performed by: INTERNAL MEDICINE

## 2017-05-26 PROCEDURE — 27000221 HC OXYGEN, UP TO 24 HOURS

## 2017-05-26 PROCEDURE — 36415 COLL VENOUS BLD VENIPUNCTURE: CPT

## 2017-05-26 PROCEDURE — 21400001 HC TELEMETRY ROOM

## 2017-05-26 PROCEDURE — 25000003 PHARM REV CODE 250: Performed by: NURSE PRACTITIONER

## 2017-05-26 PROCEDURE — 94660 CPAP INITIATION&MGMT: CPT

## 2017-05-26 PROCEDURE — 80069 RENAL FUNCTION PANEL: CPT

## 2017-05-26 PROCEDURE — 63600175 PHARM REV CODE 636 W HCPCS: Performed by: NURSE PRACTITIONER

## 2017-05-26 PROCEDURE — 25000003 PHARM REV CODE 250: Performed by: HOSPITALIST

## 2017-05-26 PROCEDURE — 25000242 PHARM REV CODE 250 ALT 637 W/ HCPCS: Performed by: NURSE PRACTITIONER

## 2017-05-26 PROCEDURE — 63600175 PHARM REV CODE 636 W HCPCS: Performed by: HOSPITALIST

## 2017-05-26 PROCEDURE — 92526 ORAL FUNCTION THERAPY: CPT

## 2017-05-26 PROCEDURE — 94640 AIRWAY INHALATION TREATMENT: CPT

## 2017-05-26 RX ORDER — LISINOPRIL 5 MG/1
5 TABLET ORAL DAILY
Status: DISCONTINUED | OUTPATIENT
Start: 2017-05-26 | End: 2017-05-26

## 2017-05-26 RX ORDER — LISINOPRIL 2.5 MG/1
2.5 TABLET ORAL DAILY
Status: DISCONTINUED | OUTPATIENT
Start: 2017-05-27 | End: 2017-05-27 | Stop reason: HOSPADM

## 2017-05-26 RX ORDER — NICOTINE 7MG/24HR
1 PATCH, TRANSDERMAL 24 HOURS TRANSDERMAL DAILY
Status: DISCONTINUED | OUTPATIENT
Start: 2017-05-27 | End: 2017-05-27 | Stop reason: HOSPADM

## 2017-05-26 RX ADMIN — CARVEDILOL 3.12 MG: 3.12 TABLET, FILM COATED ORAL at 08:05

## 2017-05-26 RX ADMIN — FAMOTIDINE 20 MG: 20 TABLET ORAL at 09:05

## 2017-05-26 RX ADMIN — ASPIRIN 81 MG: 81 TABLET, COATED ORAL at 09:05

## 2017-05-26 RX ADMIN — ATORVASTATIN CALCIUM 40 MG: 40 TABLET, FILM COATED ORAL at 09:05

## 2017-05-26 RX ADMIN — BUDESONIDE 0.5 MG: 0.5 SUSPENSION RESPIRATORY (INHALATION) at 07:05

## 2017-05-26 RX ADMIN — ARFORMOTEROL TARTRATE 15 MCG: 15 SOLUTION RESPIRATORY (INHALATION) at 07:05

## 2017-05-26 RX ADMIN — IPRATROPIUM BROMIDE AND ALBUTEROL SULFATE 3 ML: .5; 3 SOLUTION RESPIRATORY (INHALATION) at 03:05

## 2017-05-26 RX ADMIN — ENOXAPARIN SODIUM 40 MG: 100 INJECTION SUBCUTANEOUS at 05:05

## 2017-05-26 RX ADMIN — IPRATROPIUM BROMIDE AND ALBUTEROL SULFATE 3 ML: .5; 3 SOLUTION RESPIRATORY (INHALATION) at 07:05

## 2017-05-26 RX ADMIN — FAMOTIDINE 20 MG: 20 TABLET ORAL at 08:05

## 2017-05-26 RX ADMIN — CLOPIDOGREL BISULFATE 75 MG: 75 TABLET ORAL at 09:05

## 2017-05-26 RX ADMIN — CARVEDILOL 3.12 MG: 3.12 TABLET, FILM COATED ORAL at 09:05

## 2017-05-26 NOTE — ASSESSMENT & PLAN NOTE
Most likely present when she was admitted given her history and risk factors.  Echo showed ejection fraction of 30%.  Cardiology on consult with recommendations.  Optimize medical management.  Suspect ischemic heart disease.  Heparin infusion has been discontinued.  Patient started on ASA, statin and Clopidogrel.  NM stress test today.

## 2017-05-26 NOTE — PROGRESS NOTES
INPATIENT   NEUROLOGY  PROGRESS NOTE    Jeniffer Fernandez   57 y.o. female  Consult Requested By: Lewis Suarez MD    DATE 2017        Patient admitted for respiratory failure. Doing well. She is complaining of horizontal binocular diplopia. It resolves with one eye closed. She denies headaches, no nausea, no vomiting, no dizziness, no neck pain, no motor or sensory deficits, no tinnitus, no blindness, no jaw claudication, no other complaints    1a  Level of consciousness: 0=alert; keenly responsive   1b. LOC questions:  0=Answers both tasks correctly   1c. LOC commands: 0=Answers both tasks correctly   2.  Best Gaze: 0=normal   3.  Visual: 0=No visual loss   4. Facial Palsy: 0=Normal symmetric movement   5a.  Motor left arm: 0=No drift, limb holds 90 (or 45) degrees for full 10 seconds   5b.  Motor right arm: 0=No drift, limb holds 90 (or 45) degrees for full 10 seconds   6a. motor left le=No drift, limb holds 90 (or 45) degrees for full 10 seconds   6b  Motor right le=No drift, limb holds 90 (or 45) degrees for full 10 seconds   7. Limb Ataxia: 0=Absent   8.  Sensory: 0=Normal; no sensory loss   9. Best Language:  0=No aphasia, normal   10. Dysarthria: 0=Normal   11. Extinction and Inattention: 0=No abnormality         Total:   0             Past Medical History:   Diagnosis Date    Anxiety     COPD (chronic obstructive pulmonary disease)     Depression     Respiratory distress     SOB (shortness of breath)      Past Surgical History:   Procedure Laterality Date    COLONOSCOPY      TUBAL LIGATION       Family History   Problem Relation Age of Onset    COPD Mother     Cancer Father      brain    Aneurysm Brother     Suicide Son     No Known Problems Son     No Known Problems Son      Social History   Substance Use Topics    Smoking status: Current Every Day Smoker     Packs/day: 0.50     Years: 35.00     Types: Cigarettes    Smokeless tobacco: Not on file      Comment: decreasing  amount of cigarettes    Alcohol use Yes      Comment: occassionally       Review of patient's allergies indicates:   Allergen Reactions    Codeine Itching    Codeine phosphate      Itchy (skin)^        Scheduled Meds:   albuterol-ipratropium 2.5mg-0.5mg/3mL  3 mL Nebulization Q6H WAKE    arformoterol  15 mcg Nebulization Q12H    aspirin  81 mg Oral Daily    atorvastatin  40 mg Oral Daily    budesonide  0.5 mg Nebulization Q12H    carvedilol  3.125 mg Oral BID    clopidogrel  75 mg Oral Daily    enoxaparin  40 mg Subcutaneous Daily    famotidine  20 mg Oral BID    lisinopril  5 mg Oral Daily     Continuous Infusions:   PRN Meds:acetaminophen, aluminum-magnesium hydroxide-simethicone, ondansetron    Review of Systems:  All the 14 ROS were reviewed and the pertinent ones are mentioned in the HPI           OBJECTIVE:     Vital Signs (Most Recent)  Temp: 98.1 °F (36.7 °C) (05/26/17 1524)  Pulse: 105 (05/26/17 1524)  Resp: 20 (05/26/17 1524)  BP: 96/64 (05/26/17 1524)  SpO2: 95 % (05/26/17 1524)     Vital Signs Range (Last 24H):  Temp:  [97.9 °F (36.6 °C)-98.7 °F (37.1 °C)]   Pulse:  []   Resp:  [16-20]   BP: ()/(58-84)   SpO2:  [90 %-98 %]     Physical Exam:  General:  Awake, alert and follows commands. Able to carry on a conversation  HEENT:   Acephalic, Atraumatic,  EOMI, PERRLA, no nystagmus, no ptosis, no lid lag, no neglect, no gaze palsy    Able to identify objects, no objective finding of diplopia    Neck: supple, no JVD, no Carotid bruit, no torticollis,   Lungs: CTA,  No rhonchi, no rales  Heart: Regular Rate and rhythm, no murmurs, rubs and or gallops  Abdomen, Soft, non tender, non distended, no abdominal  bruit, bowel sounds present  Extremities: No edema,   Skin: No rash, no ecchymoses,         NEURO    Mental Status: AAO  Follows commands  No hallucinations  No delusions  Attention, concentration, mood, affect, : All intact    SPEECH:   No aphasia, no Dysarthria,     CRANIAL  NERVES:      II: visual acuity  Intact   II: visual fields Full to confrontation   II: pupils Equal, round, reactive to light   III,VII: ptosis None   III,IV,VI: extraocular muscles  Full ROM   V: mastication Normal   V: facial light touch sensation  Normal   V,VII: corneal reflex  Present   VII: facial muscle function - upper  Normal   VII: facial muscle function - lower Normal   VIII: hearing Intact   IX: soft palate elevation  Normal   IX,X: gag reflex Present   XI: trapezius strength  Intact   XI: sternocleidomastoid strength Intact   XI: neck flexion strength  Intact   XII: tongue strength  Normal         MOTOR:Upper Extremities and  Lower Extremities: 5/5 in proximal and distal  No PRONATION and DRIFT    TONE:  No spasticity, no rigidity, no hypotonia    MUSCLE MASS:  REFLEXES: Deep tendon reflexes tested:  Upper extremities:               biceps (C5, C6):2               brachioradialis (C5, C6):2               triceps (C6, C7),0     Lower extremities:                knee or patellar (L2, 3, 4):1               ankle (S1, S2):1      Plantar responses: flexors   Clonus:   Muscle Fasciculations:    SENSORY Intact to  PIN PRICK and TEMP, Soft TOUCH, VIBRATION            CEREBELLAR  No dysmetria  No dysdiadochokinesia  No rebound Phenomenon  No Nystagmus  No scanning speech      ROMBERG and  GAIT: Deferred    EXTRAPYRAMIDALS: none          Laboratory:  Lab Results   Component Value Date    WBC 9.87 05/25/2017    HGB 12.2 05/25/2017    HCT 38.1 05/25/2017     05/25/2017    ALT 34 05/24/2017    AST 19 05/24/2017     05/26/2017    K 4.4 05/26/2017     05/26/2017    CREATININE 0.7 05/26/2017    BUN 9 05/26/2017    CO2 27 05/26/2017    INR 1.0 05/20/2017          Diagnostic Results:CT scan films   ( All images reviewed Independently)   Imaging Results          NM Myocardial Perfusion Spect Multi Pharmacologic (In process)                X-Ray Chest 1 View (Final result)  Result time 05/24/17 08:48:54     Final result by Edison Olivier MD (05/24/17 08:48:54)                 Impression:     Worsening peripheral interstitial opacities in the lower lobes this has the appearance of interstitial edema.              Electronically signed by: EDISON OLIVIER MD  Date:     05/24/17  Time:    08:48              Narrative:    Exam: XR CHEST 1 VIEW    Clinical History: Shortness of breath. Acute respiratory failure    Findings:     Worsening peripheral lower lobe of reticular interstitial opacities.  The right upper lobe interstitial infiltrate is unchanged. Lung hyperinflation with emphysema. The cardiac silhouette is within normal limits.  NG tube in the stomach.                             X-Ray Chest 1 View (Final result)  Result time 05/22/17 08:09:28    Final result by Edison Olivier MD (05/22/17 08:09:28)                 Impression:     As above.            Electronically signed by: EDISON OLIVIER MD  Date:     05/22/17  Time:    08:09              Narrative:    Exam: XR CHEST 1 VIEW    Clinical History: Shortness of breath. SOB    Findings:     Spell and fainting interstitial infiltrate in the right upper lobe.  Reticular interstitial opacities appear increased in the periphery of the lower lobes. The cardiac silhouette is within normal limits.  No pneumothorax.  NG tube in the stomach.  Emphysema.                             X-Ray Chest 1 View (Final result)  Result time 05/21/17 08:03:02    Final result by Eric Salomon MD (05/21/17 08:03:02)                 Impression:     See above.      Electronically signed by: ERIC SALOMON  Date:     05/21/17  Time:    08:03              Narrative:    Chest x-ray single view    Indication: Shortness of breath.    Findings: Comparison study 5/20/2017.  No change.                             US Abdomen Limited (Final result)  Result time 05/20/17 12:54:42    Final result by Hussein Simon MD (05/20/17 12:54:42)                 Impression:           1.  1.7 cm right hepatic lobe  cyst.  2.  Mild gallbladder wall thickening, without gallstones, pericholecystic fluid or sonographic Newsome sign.  Chronic cholecystitis could cause this appearance.  3.  Otherwise, normal study.      Electronically signed by: LAKISHA RITCHIE MD  Date:     05/20/17  Time:    12:54              Narrative:    Right quadrant ultrasound, color-flow and spectral doppler interrogation    History:    Acute encephalopathy with elevated Ammonia.  Suspect fibrosis/cirrhosis but no ascites..  Abnormal results of liver function studies.    Findings:   No comparison studies are available.  There is a 1.7 cm right hepatic lobe cyst. The liver is otherwise normal, and measures 13.5 cm. The gallbladder is normal.  Mild gallbladder wall thickening. Negative for sonographic Newsome's sign.  The common duct measures 4 mm. The right kidney measures 10.5 cm.  It is without focal solid or cystic masses, hydronephrosis, perinephric fluid collections or shadowing stones. The visualized portions of the pancreas are normal.  The aorta and IVC are normal in caliber.  Hepatopedal flow is documented in the portal vein.  Negative for ascites.    Hepatorenal index is not recorded.                             X-Ray Chest 1 View (Final result)  Result time 05/20/17 09:15:27    Final result by David Elliott Jr., MD (05/20/17 09:15:27)                 Impression:     No significant interval change.      Electronically signed by: DAVID ELLIOTT  Date:     05/20/17  Time:    09:15              Narrative:    Exam: Portable chest radiograph    History:    Shortness of breath.    Comparison: 5/19/2017    Findings: Lungs are clear with prominent interstitium similar as before.  Enteric tube is in the stomach.  Cardiac silhouette and mediastinum within normal limits.  No effusion or pneumothorax.                             X-Ray Chest 1 View (Final result)  Result time 05/19/17 20:37:23    Final result by CHILANGO Rodriguez Sr., MD (05/19/17 20:37:23)                  Impression:        1.  There is a mild amount of interstitial opacities seen in both lungs with Kerley B-lines visualized bilaterally.  This is characteristic of pulmonary edema.  2.  An NG tube remains in place.    3.  There is surgical hardware in the proximal portion of the left humerus.        Electronically signed by: CHILANGO RODRIGUEZ MD  Date:     05/19/17  Time:    20:37              Narrative:    One-view chest x-ray    Clinical History: Hypoxia    Finding: Comparison was made to a prior examination performed on 5/18/2017.  An NG tube remains in place.  The size of the heart is normal.  There is a mild amount of interstitial opacities seen in both lungs with Kerley B-lines visualized bilaterally.  There is no pneumothorax.  The costophrenic angles are sharp.  There is surgical hardware in the proximal portion of the left humerus.                             X-Ray Chest 1 View (Final result)  Result time 05/18/17 11:49:18    Final result by James Caruso MD (05/18/17 11:49:18)                 Impression:      Please see above.      Electronically signed by: JAMES CARUSO MD  Date:     05/18/17  Time:    11:49              Narrative:    Exam: XR CHEST 1 VIEW,    Date:  05/18/17 11:04:25    History: NG tube placement    Comparison:  Earlier film from the same day.    Findings: Nasogastric tube has been placed with the distal tip well below the GE junction in good position.  Otherwise no change.                             CT Head Without Contrast (Final result)  Result time 05/18/17 10:46:24    Final result by James Caruso MD (05/18/17 10:46:24)                 Impression:         Negative noncontrast CT scan of the brain.         All CT scans at this facility use dose modulation, iterative reconstruction, and/or weight based dosing when appropriate to reduce radiation dose to as low as reasonably achievable.       Electronically signed by: JAMES CARUSO MD  Date:      05/18/17  Time:    10:46              Narrative:    CT HEAD WITHOUT CONTRAST    Date: 05/18/17 10:36:40    Clinical indication: Altered mental status.  Nonresponsive patient.     Technique:  Standard noncontrast CT scan of the brain. No previous for comparison.     Findings:  The ventricles are nondilated. Gray and white matter structures reveal normal attenuation. No hemorrhage, mass effect or edema is identified.     The skull and skull base are unremarkable.                             X-Ray Chest 1 View (Final result)  Result time 05/18/17 08:02:46    Final result by James Caruso MD (05/18/17 08:02:46)                 Impression:      Please see above.      Electronically signed by: JAMES CARUSO MD  Date:     05/18/17  Time:    08:02              Narrative:    Exam: XR CHEST 1 VIEW,    Date:  05/18/17 05:34:00    History: SOB    Comparison:  05/14/2017.    Findings: Heart size is normal.  No pneumothorax.  Reticulonodular lung disease is slightly improved.  Postoperative changes left humeral neck.                             CTA Chest Non-Coronary (Final result)  Result time 05/14/17 10:56:46    Final result by Dustin Caal MD (05/14/17 10:56:46)                 Impression:      Negative for pulmonary embolus.    Patchy right upper lobe and right lower lobe interstitial infiltrate/pneumonia with some nodularity at the dependent right lower lobe.  No pleural effusion.  Short term imaging followup after appropriate treatment is recommended.    Mediastinal adenopathy, perhaps reactive.  Attention on followup.    Emphysema.    Moderate stenosis proximal celiac artery.          All CT scans at this facility use dose modulation, iterative reconstruction, and/or weight based dosing when appropriate to reduce radiation dose to as low as reasonably achievable.      Electronically signed by: DUSTIN CAAL MD  Date:     05/14/17  Time:    10:56              Narrative:    EXAM: CTA CHEST NON  CORONARY    CLINICAL HISTORY: shortness of breath    TECHNIQUE: CT angiogram performed of the chest following IV contrast administration to evaluate for pulmonary emboli. Multiplanar MIP reformations performed.    COMPARISON STUDIES: Chest x-ray May 14, 2017    FINDINGS:  No evidence of pulmonary embolus.  Minimal aortic atherosclerosis without aneurysm or dissection.    Marked emphysematous changes in the lungs with apical and paraseptal bullous changes.  Patchy interstitial infiltrate posterior right upper lobe and posterior right lower lobe.  Some areas of nodularity in the dependent right lung base within this area of interstitial thickening with largest area of nodularity measuring 1.9 cm.    Numerous enlarged mediastinal lymph nodes are noted with a representative subcarinal lymph node measuring 1.4 x 1.3 cm.    Right hepatic lobe cyst.  Moderate narrowing proximal segment celiac artery.                             X-Ray Chest 1 View (Final result)  Result time 05/14/17 09:11:48    Final result by David Caal MD (05/14/17 09:11:48)                 Impression:      Diffuse chronic interstitial prominence with improving right upper lobe infiltrate with a small amount of residual right upper lobe interstitial markings remaining from prior chest x-ray.  Continued short-term followup recommended.      Electronically signed by: DAVID CAAL MD  Date:     05/14/17  Time:    09:11              Narrative:    EXAM: XR CHEST 1 VIEW    CLINICAL HISTORY: Shortness of breath.    COMPARISON STUDIES: December 22, 2016    FINDINGS:  The cardiomediastinal silhouette is within normal limits.  Diffuse mild interstitial prominence throughout the lungs with minimal asymmetric markings in the right upper lobe, though improved from prior exam.  Surgical plate proximal left humerus.                              05/26/2017    Assessment and Recommendations:  Patient with respiratory failure. Now complaining of horizontal diplopia-  subjective          Differential diagnosis:  1) Rule out Brain stem stroke      C/W ASA  C/W statin  Reinforced to quit smoking  MRI is pending    Family Meeting:  Educated about smoking, stroke, cardiac and respiratory disease            Juan Daniel Duff MD., Ph.D., MS

## 2017-05-26 NOTE — PLAN OF CARE
Problem: Patient Care Overview  Goal: Plan of Care Review  Outcome: Ongoing (interventions implemented as appropriate)  Pt is sinus rhythm, sinus tach on the monitor. Pt has some bruising and swelling on left hand from hitting the side rails. Pt has had an uneventful night and is resting quietly, will continue to monitor.

## 2017-05-26 NOTE — PLAN OF CARE
Problem: SLP Goal  Goal: SLP Goal  1.  ST to complete an ongoing swallow assessment with MBSS if warranted to establish po readiness  2. Pt will complete oral and pharyngeal ex's with min A for increased swallow strength and coordination  3. Pt will tolerate least restrictive diet without incidence   Outcome: Ongoing (interventions implemented as appropriate)  Pt is having s/s of aspiration with mixed consistencies. Recommend avoiding mixed consistencies and taking pills buried in puree or crushed in puree.

## 2017-05-26 NOTE — PROGRESS NOTES
[facility] Hospital Medicine  Progress Note    Patient Name: Jeniffer Fernandez  MRN: [id number]  Patient Class: IP- Inpatient   Admission Date: 5/14/2017  Length of Stay: 11 days  Attending Physician: Lewis Suarez MD  Primary Care Provider: Ben Pelaez Jr, MD        Subjective:     Principal Problem:Acute on chronic respiratory failure with hypoxia    HPI:  Jeniffer Fernandez is a 58 yo female + smoker with COPD and chronic respiratory failure who presented with worsening SOB over 1 week. For 5 days she had worn oxygen continuously. She describes frequent productive cough, wheezing, SOB, generalized weakness and FRANCES. Prior to presentation she had subjective fever and sweats. Pt has had several neb treatments and still having SOB. In the ED, ABG reflects acute on chronic hypoxic respiratory failure and O2 sat 87 %. CTA of Chest indicated a patchy RUL/RLL interstitial infiltrate.     Hospital Course:  Very agitated late morning 16 May and received Haldol and Ativan and slept most of the day.  She was better the following morning but became more confused and agitated in the evening and combative with staff.  Increased wheezing and pulling her oxygen off with desaturations.  When stable her blood gas showed adequate oxygenation and a pCO2 only slightly high at 50.  Transferred to ICU for respiratory failure. Was started on Precedex that has now been discontinued.    5/18 - Continued to have intermittent periods of agitation. Negative head CT and Neurology consult placed.    5/19 - Evening agitation.    5/20 - Started heparin drip and aspirin per Cardiology recommendations.  Echo shows HF with reduced ejection fraction of 30% and slightly elevated troponin.    5/21 - Symptoms of agitation have improved.    5/22 - Patient with continued improved in mental status.    5/23 - Some intermittent agitation in the evening. Failed swallow evaluation.  5/25 - NMS showing fixed defect and reduced EF 25%.  Medical  management per Cardiology.    Interval History:  NM stress test today.  Working with case management on preferences.      Review of Systems   Constitutional: Negative for activity change and appetite change.   HENT: Negative for congestion and dental problem.    Respiratory: Negative for cough and chest tightness.    Cardiovascular: Negative for chest pain and leg swelling.   Gastrointestinal: Negative for abdominal distention and abdominal pain.   Endocrine: Negative for cold intolerance.   Neurological: Negative for dizziness.     Objective:     Vital Signs (Most Recent):  Temp: 98.1 °F (36.7 °C) (05/25/17 1944)  Pulse: 93 (05/25/17 1944)  Resp: 18 (05/25/17 1944)  BP: 94/70 (05/25/17 1944)  SpO2: 98 % (05/25/17 1944) Vital Signs (24h Range):  Temp:  [97.6 °F (36.4 °C)-99 °F (37.2 °C)] 98.1 °F (36.7 °C)  Pulse:  [] 93  Resp:  [18-22] 18  SpO2:  [93 %-100 %] 98 %  BP: ()/(63-83) 94/70     Weight: 49.1 kg (108 lb 3.9 oz)  Body mass index is 18.58 kg/m².    Intake/Output Summary (Last 24 hours) at 05/25/17 2056  Last data filed at 05/25/17 1700   Gross per 24 hour   Intake              720 ml   Output              250 ml   Net              470 ml      Physical Exam   Constitutional: She appears well-developed and well-nourished. No distress.   HENT:   Head: Normocephalic and atraumatic.   Mouth/Throat: Oropharynx is clear and moist.   Eyes: Conjunctivae and EOM are normal. Pupils are equal, round, and reactive to light.   Neck: Neck supple. No JVD present. No thyromegaly present.   Cardiovascular: Normal rate and regular rhythm.  Exam reveals no gallop and no friction rub.    No murmur heard.  Pulmonary/Chest: She has wheezes. She has no rales.   Coarse crackles bilaterally.  Expiratory wheezes and generally reduced breath sounds.   Abdominal: Soft. Bowel sounds are normal. She exhibits no distension. There is no tenderness. There is no rebound and no guarding.   Musculoskeletal: Normal range of motion.  She exhibits no edema or deformity.   Lymphadenopathy:     She has no cervical adenopathy.   Neurological: She has normal reflexes.   Somnolent   Skin: Skin is warm and dry. No rash noted.   Nursing note and vitals reviewed.    Significant Labs:   A1C: No results for input(s): HGBA1C in the last 4320 hours.  ABGs: No results for input(s): PH, PCO2, HCO3, POCSATURATED, BE in the last 48 hours.  Bilirubin:     Recent Labs  Lab 05/19/17  0409 05/20/17  0400 05/21/17  0418 05/24/17  0730 05/24/17  0845   BILIDIR 0.1  --   --   --  0.2   BILITOT 0.3 0.3 0.3 0.3 0.3     Blood Culture: No results for input(s): LABBLOO in the last 48 hours.  BMP:     Recent Labs  Lab 05/25/17  1617   GLU 95      K 4.1      CO2 32*   BUN 6   CREATININE 0.6   CALCIUM 9.4   MG 2.1     CBC:     Recent Labs  Lab 05/25/17  1617   WBC 9.87   HGB 12.2   HCT 38.1        CMP:     Recent Labs  Lab 05/24/17  0730 05/24/17  0845 05/25/17  1617    139 143   K 2.5* 4.2 4.1   * 104 101   CO2 19* 28 32*   GLU 71 104 95   BUN 4* 5* 6   CREATININE 0.3* 0.6 0.6   CALCIUM 5.0* 8.7 9.4   PROT 3.2* 3.1*  --    ALBUMIN 1.6* 1.6*  --    BILITOT 0.3 0.3  --    ALKPHOS 42* 41*  --    AST 14 19  --    ALT 33 34  --    ANIONGAP 3* 7* 10   EGFRNONAA >60 >60 >60     Cardiac Markers: No results for input(s): CKMB, MYOGLOBIN, BNP, TROPISTAT in the last 48 hours.  Coagulation: No results for input(s): INR, APTT in the last 48 hours.    Invalid input(s): PT  Lactic Acid: No results for input(s): LACTATE in the last 48 hours.  Lipase: No results for input(s): LIPASE in the last 48 hours.  Lipid Panel: No results for input(s): CHOL, HDL, LDLCALC, TRIG, CHOLHDL in the last 48 hours.    Significant Imaging:   Imaging Results          NM Myocardial Perfusion Spect Multi Pharmacologic (In process)                X-Ray Chest 1 View (Final result)  Result time 05/24/17 08:48:54    Final result by Dontrell Shrestha MD (05/24/17 08:48:54)                  Impression:     Worsening peripheral interstitial opacities in the lower lobes this has the appearance of interstitial edema.              Electronically signed by: EDISON OLIVIER MD  Date:     05/24/17  Time:    08:48              Narrative:    Exam: XR CHEST 1 VIEW    Clinical History: Shortness of breath. Acute respiratory failure    Findings:     Worsening peripheral lower lobe of reticular interstitial opacities.  The right upper lobe interstitial infiltrate is unchanged. Lung hyperinflation with emphysema. The cardiac silhouette is within normal limits.  NG tube in the stomach.                             X-Ray Chest 1 View (Final result)  Result time 05/22/17 08:09:28    Final result by Edison Olivier MD (05/22/17 08:09:28)                 Impression:     As above.            Electronically signed by: EDISON OLIVIER MD  Date:     05/22/17  Time:    08:09              Narrative:    Exam: XR CHEST 1 VIEW    Clinical History: Shortness of breath. SOB    Findings:     Spell and fainting interstitial infiltrate in the right upper lobe.  Reticular interstitial opacities appear increased in the periphery of the lower lobes. The cardiac silhouette is within normal limits.  No pneumothorax.  NG tube in the stomach.  Emphysema.                             X-Ray Chest 1 View (Final result)  Result time 05/21/17 08:03:02    Final result by Eric Salomon MD (05/21/17 08:03:02)                 Impression:     See above.      Electronically signed by: ERIC SALOMON  Date:     05/21/17  Time:    08:03              Narrative:    Chest x-ray single view    Indication: Shortness of breath.    Findings: Comparison study 5/20/2017.  No change.                             US Abdomen Limited (Final result)  Result time 05/20/17 12:54:42    Final result by Hussein Simon MD (05/20/17 12:54:42)                 Impression:           1.  1.7 cm right hepatic lobe cyst.  2.  Mild gallbladder wall thickening, without gallstones,  pericholecystic fluid or sonographic Newsome sign.  Chronic cholecystitis could cause this appearance.  3.  Otherwise, normal study.      Electronically signed by: LAKISHA RITCHIE MD  Date:     05/20/17  Time:    12:54              Narrative:    Right quadrant ultrasound, color-flow and spectral doppler interrogation    History:    Acute encephalopathy with elevated Ammonia.  Suspect fibrosis/cirrhosis but no ascites..  Abnormal results of liver function studies.    Findings:   No comparison studies are available.  There is a 1.7 cm right hepatic lobe cyst. The liver is otherwise normal, and measures 13.5 cm. The gallbladder is normal.  Mild gallbladder wall thickening. Negative for sonographic Newsome's sign.  The common duct measures 4 mm. The right kidney measures 10.5 cm.  It is without focal solid or cystic masses, hydronephrosis, perinephric fluid collections or shadowing stones. The visualized portions of the pancreas are normal.  The aorta and IVC are normal in caliber.  Hepatopedal flow is documented in the portal vein.  Negative for ascites.    Hepatorenal index is not recorded.                             X-Ray Chest 1 View (Final result)  Result time 05/20/17 09:15:27    Final result by David Elliott Jr., MD (05/20/17 09:15:27)                 Impression:     No significant interval change.      Electronically signed by: DAVID ELLIOTT  Date:     05/20/17  Time:    09:15              Narrative:    Exam: Portable chest radiograph    History:    Shortness of breath.    Comparison: 5/19/2017    Findings: Lungs are clear with prominent interstitium similar as before.  Enteric tube is in the stomach.  Cardiac silhouette and mediastinum within normal limits.  No effusion or pneumothorax.                             X-Ray Chest 1 View (Final result)  Result time 05/19/17 20:37:23    Final result by CHILANGO Rodriguez Sr., MD (05/19/17 20:37:23)                 Impression:        1.  There is a mild amount of  interstitial opacities seen in both lungs with Kerley B-lines visualized bilaterally.  This is characteristic of pulmonary edema.  2.  An NG tube remains in place.    3.  There is surgical hardware in the proximal portion of the left humerus.        Electronically signed by: CHILANGO RODRIGUEZ MD  Date:     05/19/17  Time:    20:37              Narrative:    One-view chest x-ray    Clinical History: Hypoxia    Finding: Comparison was made to a prior examination performed on 5/18/2017.  An NG tube remains in place.  The size of the heart is normal.  There is a mild amount of interstitial opacities seen in both lungs with Kerley B-lines visualized bilaterally.  There is no pneumothorax.  The costophrenic angles are sharp.  There is surgical hardware in the proximal portion of the left humerus.                             X-Ray Chest 1 View (Final result)  Result time 05/18/17 11:49:18    Final result by James Caruso MD (05/18/17 11:49:18)                 Impression:      Please see above.      Electronically signed by: JAMES CARUSO MD  Date:     05/18/17  Time:    11:49              Narrative:    Exam: XR CHEST 1 VIEW,    Date:  05/18/17 11:04:25    History: NG tube placement    Comparison:  Earlier film from the same day.    Findings: Nasogastric tube has been placed with the distal tip well below the GE junction in good position.  Otherwise no change.                             CT Head Without Contrast (Final result)  Result time 05/18/17 10:46:24    Final result by James Caruso MD (05/18/17 10:46:24)                 Impression:         Negative noncontrast CT scan of the brain.         All CT scans at this facility use dose modulation, iterative reconstruction, and/or weight based dosing when appropriate to reduce radiation dose to as low as reasonably achievable.       Electronically signed by: JAMES CARUSO MD  Date:     05/18/17  Time:    10:46              Narrative:    CT HEAD WITHOUT  CONTRAST    Date: 05/18/17 10:36:40    Clinical indication: Altered mental status.  Nonresponsive patient.     Technique:  Standard noncontrast CT scan of the brain. No previous for comparison.     Findings:  The ventricles are nondilated. Gray and white matter structures reveal normal attenuation. No hemorrhage, mass effect or edema is identified.     The skull and skull base are unremarkable.                             X-Ray Chest 1 View (Final result)  Result time 05/18/17 08:02:46    Final result by James Caruso MD (05/18/17 08:02:46)                 Impression:      Please see above.      Electronically signed by: JAMES CARUSO MD  Date:     05/18/17  Time:    08:02              Narrative:    Exam: XR CHEST 1 VIEW,    Date:  05/18/17 05:34:00    History: SOB    Comparison:  05/14/2017.    Findings: Heart size is normal.  No pneumothorax.  Reticulonodular lung disease is slightly improved.  Postoperative changes left humeral neck.                             CTA Chest Non-Coronary (Final result)  Result time 05/14/17 10:56:46    Final result by Dustin Caal MD (05/14/17 10:56:46)                 Impression:      Negative for pulmonary embolus.    Patchy right upper lobe and right lower lobe interstitial infiltrate/pneumonia with some nodularity at the dependent right lower lobe.  No pleural effusion.  Short term imaging followup after appropriate treatment is recommended.    Mediastinal adenopathy, perhaps reactive.  Attention on followup.    Emphysema.    Moderate stenosis proximal celiac artery.          All CT scans at this facility use dose modulation, iterative reconstruction, and/or weight based dosing when appropriate to reduce radiation dose to as low as reasonably achievable.      Electronically signed by: DUSTIN CAAL MD  Date:     05/14/17  Time:    10:56              Narrative:    EXAM: CTA CHEST NON CORONARY    CLINICAL HISTORY: shortness of breath    TECHNIQUE: CT angiogram performed  of the chest following IV contrast administration to evaluate for pulmonary emboli. Multiplanar MIP reformations performed.    COMPARISON STUDIES: Chest x-ray May 14, 2017    FINDINGS:  No evidence of pulmonary embolus.  Minimal aortic atherosclerosis without aneurysm or dissection.    Marked emphysematous changes in the lungs with apical and paraseptal bullous changes.  Patchy interstitial infiltrate posterior right upper lobe and posterior right lower lobe.  Some areas of nodularity in the dependent right lung base within this area of interstitial thickening with largest area of nodularity measuring 1.9 cm.    Numerous enlarged mediastinal lymph nodes are noted with a representative subcarinal lymph node measuring 1.4 x 1.3 cm.    Right hepatic lobe cyst.  Moderate narrowing proximal segment celiac artery.                             X-Ray Chest 1 View (Final result)  Result time 05/14/17 09:11:48    Final result by David Caal MD (05/14/17 09:11:48)                 Impression:      Diffuse chronic interstitial prominence with improving right upper lobe infiltrate with a small amount of residual right upper lobe interstitial markings remaining from prior chest x-ray.  Continued short-term followup recommended.      Electronically signed by: DAVID CAAL MD  Date:     05/14/17  Time:    09:11              Narrative:    EXAM: XR CHEST 1 VIEW    CLINICAL HISTORY: Shortness of breath.    COMPARISON STUDIES: December 22, 2016    FINDINGS:  The cardiomediastinal silhouette is within normal limits.  Diffuse mild interstitial prominence throughout the lungs with minimal asymmetric markings in the right upper lobe, though improved from prior exam.  Surgical plate proximal left humerus.                                Assessment/Plan:      Physical deconditioning    PT evaluation.          Chronic systolic heart failure    Most likely present when she was admitted given her history and risk factors.  Echo showed ejection  fraction of 30%.  Cardiology on consult with recommendations.  Optimize medical management.  Suspect ischemic heart disease.  Heparin infusion has been discontinued.  Patient started on ASA, statin and Clopidogrel.  NM stress test today.        LFT elevation    LFT's were normal on admission.  Check daily LFTs. Ammonia level was marginally elevated and responded to Lactulose. Liver Ultrasound showed a hepatic cyst and a mildly thickened gall bladder. Hepatitis panel is negative.         Delirium due to another medical condition, acute, hyperactive    Her episodes of confusion and agitation are unclear. She has a history of depression and misuse of medication and alcohol. CT head is negative for an acute intracranial process.  Neurology evaluated.  EEG unrevealing.  Ammonia level has normalized. Symptoms are improving slowly. Seroquel has been discontinued. Avoid sedating medications.        COPD exacerbation    Supplemental oxygen  Xopenex  IV Corticosteroids held.        Tobacco abuse    Must quit smoking and alcohol.  Denies need for Nicotine patch        Bipolar disorder    Holding all psychiatric meds.  She endorsed a history of misuse of Alcohol and Latuda.   denies any formal diagnosis of Bipolar Disorder.        Anxiety    Holding all home psychiatric medications.  Careful use of Benzodiazepines with severe COPD.  PRN Ativan for agitation.  Seroquel has been discontinued.        * Acute on chronic respiratory failure with hypoxia    Supplemental oxygen to maintain sat > 92 % - wean as appropriate.  She has severe underlying COPD with most recent PFT in 2014 and has continued to smoke since then.  Pulmonary following.          VTE Risk Mitigation         Ordered     enoxaparin injection 40 mg  Daily     Route:  Subcutaneous        05/23/17 1153     Medium Risk of VTE  Once      05/14/17 1132     Place sequential compression device  Until discontinued      05/14/17 1132          Lewis Suarez,  MD  Department of Hospital Medicine   [facility]

## 2017-05-26 NOTE — PROGRESS NOTES
Cardiology Progress Note        SUBJECTIVE:     History of Present Illness:  Pt is sitting on the bed, oriented and alert. C/o double vision.  No chest pain, dyspnea and dizziness,       OBJECTIVE:     Vital Signs (Most Recent)  Temp: 97.5 °F (36.4 °C) (05/26/17 1700)  Pulse: 94 (05/26/17 1700)  Resp: 20 (05/26/17 1700)  BP: 91/61 (05/26/17 1700)  SpO2: 98 % (05/26/17 1700)    Vital Signs Range (Last 24H):  Temp:  [97.5 °F (36.4 °C)-98.7 °F (37.1 °C)]   Pulse:  []   Resp:  [16-20]   BP: ()/(58-84)   SpO2:  [90 %-98 %]     Intake/Output last 3 shifts:  I/O last 3 completed shifts:  In: 720 [P.O.:720]  Out: 250 [Urine:250]    Intake/Output this shift:  No intake/output data recorded.    Review of patient's allergies indicates:   Allergen Reactions    Codeine Itching    Codeine phosphate      Itchy (skin)^       Current Facility-Administered Medications   Medication    acetaminophen tablet 650 mg    albuterol-ipratropium 2.5mg-0.5mg/3mL nebulizer solution 3 mL    aluminum-magnesium hydroxide-simethicone 200-200-20 mg/5 mL suspension 30 mL    arformoterol nebulizer solution 15 mcg    aspirin EC tablet 81 mg    atorvastatin tablet 40 mg    budesonide nebulizer solution 0.5 mg    carvedilol tablet 3.125 mg    clopidogrel tablet 75 mg    enoxaparin injection 40 mg    famotidine tablet 20 mg    lisinopril tablet 5 mg    ondansetron injection 4 mg     No current facility-administered medications on file prior to encounter.      Current Outpatient Prescriptions on File Prior to Encounter   Medication Sig    amitriptyline (ELAVIL) 50 MG tablet Take 1 tablet (50 mg total) by mouth every evening. (Patient taking differently: Take 25 mg by mouth 3 (three) times daily. )    lorazepam (ATIVAN) 0.5 MG tablet Take 0.5 mg by mouth every 6 (six) hours as needed for Anxiety.    montelukast (SINGULAIR) 10 mg tablet Take 10 mg by mouth every evening.    paroxetine (PAXIL) 20 MG tablet Take 40 mg by mouth  every morning.     predniSONE (DELTASONE) 10 MG tablet Please take 20mg twice daily x 3 days, then take 10 mg twice daily x 2 days, then take 10 mg once daily x 2 days    ropinirole (REQUIP) 1 MG tablet Take 1 mg by mouth every evening.    b complex vitamins tablet Take 1 tablet by mouth once daily.    docusate sodium (COLACE) 100 MG capsule Take 1 capsule (100 mg total) by mouth 2 (two) times daily.    fluticasone (FLONASE) 50 mcg/actuation nasal spray 2 sprays by Each Nare route once daily.    mometasone-formoterol (DULERA) 200-5 mcg/actuation inhaler Inhale 2 puffs into the lungs 2 (two) times daily.    nicotine (NICODERM CQ) 21 mg/24 hr Place 1 patch onto the skin once daily.       Physical Exam:  General appearance: alert, appears stated age and cooperative  Head: Normocephalic, without obvious abnormality, atraumatic  Eyes:  conjunctivae/corneas clear. PERRL, EOM's intact. Fundi benign.  Nose: no discharge  Throat: normal findings: tongue midline and normal  Neck: no adenopathy, no carotid bruit, no JVD, supple, symmetrical, trachea midline and thyroid not enlarged, symmetric, no tenderness/mass/nodules  Back:  no skin lesions, erythema, or scars  Lungs:  clear to auscultation bilaterally  Chest wall: no tenderness  Heart: regular rate and rhythm, S1, S2 normal, no murmur, click, rub or gallop  Abdomen: soft, non-tender; bowel sounds normal; no masses,  no organomegaly  Extremities: extremities normal, atraumatic, no cyanosis or edema  Pulses: 2+ and symmetric  Skin: Skin color, texture, turgor normal. No rashes or lesions  Neurologic: Grossly normal    Laboratory:  Chemistry:   Lab Results   Component Value Date     05/26/2017    K 4.4 05/26/2017     05/26/2017    CO2 27 05/26/2017    BUN 9 05/26/2017    CREATININE 0.7 05/26/2017    CALCIUM 9.0 05/26/2017     Cardiac Markers:   Lab Results   Component Value Date    TROPONINI 0.100 (H) 05/20/2017     Cardiac Markers (Last 3):   Lab Results    Component Value Date    TROPONINI 0.100 (H) 05/20/2017    TROPONINI 0.107 (H) 05/19/2017    TROPONINI 0.128 (H) 05/19/2017     CBC:   Lab Results   Component Value Date    WBC 9.87 05/25/2017    HGB 12.2 05/25/2017    HCT 38.1 05/25/2017     (H) 05/25/2017     05/25/2017     Lipids: No results found for: CHOL, TRIG, HDL, LDLDIRECT  Coagulation:   Lab Results   Component Value Date    INR 1.0 05/20/2017    APTT 37.1 (H) 05/23/2017       Diagnostic Results:  ECG: Reviewed  X-Ray: Reviewed  US: Reviewed  CT: Reviewed  Echo: Reviewed      ASSESSMENT/PLAN:     Patient Active Problem List   Diagnosis    Closed fracture of proximal end of left humerus    Fracture of proximal humerus    Pneumonia    Depression    Anxiety    Bipolar disorder    COPD, severe    Tobacco abuse    Abdominal pain    Lung infiltrate    COPD exacerbation    Acute on chronic respiratory failure with hypoxia    Delirium due to another medical condition, acute, hyperactive    LFT elevation    Chronic systolic heart failure    Physical deconditioning    Oropharyngeal dysphagia      NSTEMI/ACS  Had nuclear stress test today, anterior small to moderate sized infarct with damon ischemia. EF 25%.  CHF compensated  AMS resolved.  Question h/o alcohol abuse  COPD   Plan:   continue ASA, liptior 40 mg daily, coreg and Plavix.  Lisinopril 2.5 mg daily  F/u as op.

## 2017-05-26 NOTE — SUBJECTIVE & OBJECTIVE
Interval History:  NM stress test today.  Working with case management on preferences.      Review of Systems   Constitutional: Negative for activity change and appetite change.   HENT: Negative for congestion and dental problem.    Respiratory: Negative for cough and chest tightness.    Cardiovascular: Negative for chest pain and leg swelling.   Gastrointestinal: Negative for abdominal distention and abdominal pain.   Endocrine: Negative for cold intolerance.   Neurological: Negative for dizziness.     Objective:     Vital Signs (Most Recent):  Temp: 98.1 °F (36.7 °C) (05/25/17 1944)  Pulse: 93 (05/25/17 1944)  Resp: 18 (05/25/17 1944)  BP: 94/70 (05/25/17 1944)  SpO2: 98 % (05/25/17 1944) Vital Signs (24h Range):  Temp:  [97.6 °F (36.4 °C)-99 °F (37.2 °C)] 98.1 °F (36.7 °C)  Pulse:  [] 93  Resp:  [18-22] 18  SpO2:  [93 %-100 %] 98 %  BP: ()/(63-83) 94/70     Weight: 49.1 kg (108 lb 3.9 oz)  Body mass index is 18.58 kg/m².    Intake/Output Summary (Last 24 hours) at 05/25/17 2056  Last data filed at 05/25/17 1700   Gross per 24 hour   Intake              720 ml   Output              250 ml   Net              470 ml      Physical Exam   Constitutional: She appears well-developed and well-nourished. No distress.   HENT:   Head: Normocephalic and atraumatic.   Mouth/Throat: Oropharynx is clear and moist.   Eyes: Conjunctivae and EOM are normal. Pupils are equal, round, and reactive to light.   Neck: Neck supple. No JVD present. No thyromegaly present.   Cardiovascular: Normal rate and regular rhythm.  Exam reveals no gallop and no friction rub.    No murmur heard.  Pulmonary/Chest: She has wheezes. She has no rales.   Coarse crackles bilaterally.  Expiratory wheezes and generally reduced breath sounds.   Abdominal: Soft. Bowel sounds are normal. She exhibits no distension. There is no tenderness. There is no rebound and no guarding.   Musculoskeletal: Normal range of motion. She exhibits no edema or  deformity.   Lymphadenopathy:     She has no cervical adenopathy.   Neurological: She has normal reflexes.   Somnolent   Skin: Skin is warm and dry. No rash noted.   Nursing note and vitals reviewed.    Significant Labs:   A1C: No results for input(s): HGBA1C in the last 4320 hours.  ABGs: No results for input(s): PH, PCO2, HCO3, POCSATURATED, BE in the last 48 hours.  Bilirubin:     Recent Labs  Lab 05/19/17  0409 05/20/17  0400 05/21/17  0418 05/24/17  0730 05/24/17  0845   BILIDIR 0.1  --   --   --  0.2   BILITOT 0.3 0.3 0.3 0.3 0.3     Blood Culture: No results for input(s): LABBLOO in the last 48 hours.  BMP:     Recent Labs  Lab 05/25/17  1617   GLU 95      K 4.1      CO2 32*   BUN 6   CREATININE 0.6   CALCIUM 9.4   MG 2.1     CBC:     Recent Labs  Lab 05/25/17  1617   WBC 9.87   HGB 12.2   HCT 38.1        CMP:     Recent Labs  Lab 05/24/17  0730 05/24/17  0845 05/25/17  1617    139 143   K 2.5* 4.2 4.1   * 104 101   CO2 19* 28 32*   GLU 71 104 95   BUN 4* 5* 6   CREATININE 0.3* 0.6 0.6   CALCIUM 5.0* 8.7 9.4   PROT 3.2* 3.1*  --    ALBUMIN 1.6* 1.6*  --    BILITOT 0.3 0.3  --    ALKPHOS 42* 41*  --    AST 14 19  --    ALT 33 34  --    ANIONGAP 3* 7* 10   EGFRNONAA >60 >60 >60     Cardiac Markers: No results for input(s): CKMB, MYOGLOBIN, BNP, TROPISTAT in the last 48 hours.  Coagulation: No results for input(s): INR, APTT in the last 48 hours.    Invalid input(s): PT  Lactic Acid: No results for input(s): LACTATE in the last 48 hours.  Lipase: No results for input(s): LIPASE in the last 48 hours.  Lipid Panel: No results for input(s): CHOL, HDL, LDLCALC, TRIG, CHOLHDL in the last 48 hours.    Significant Imaging:   Imaging Results          NM Myocardial Perfusion Spect Multi Pharmacologic (In process)                X-Ray Chest 1 View (Final result)  Result time 05/24/17 08:48:54    Final result by Dontrell Shrestha MD (05/24/17 08:48:54)                 Impression:     Worsening  peripheral interstitial opacities in the lower lobes this has the appearance of interstitial edema.              Electronically signed by: EDISON OLIVIER MD  Date:     05/24/17  Time:    08:48              Narrative:    Exam: XR CHEST 1 VIEW    Clinical History: Shortness of breath. Acute respiratory failure    Findings:     Worsening peripheral lower lobe of reticular interstitial opacities.  The right upper lobe interstitial infiltrate is unchanged. Lung hyperinflation with emphysema. The cardiac silhouette is within normal limits.  NG tube in the stomach.                             X-Ray Chest 1 View (Final result)  Result time 05/22/17 08:09:28    Final result by Edison Olivier MD (05/22/17 08:09:28)                 Impression:     As above.            Electronically signed by: EDISON OLIVIER MD  Date:     05/22/17  Time:    08:09              Narrative:    Exam: XR CHEST 1 VIEW    Clinical History: Shortness of breath. SOB    Findings:     Spell and fainting interstitial infiltrate in the right upper lobe.  Reticular interstitial opacities appear increased in the periphery of the lower lobes. The cardiac silhouette is within normal limits.  No pneumothorax.  NG tube in the stomach.  Emphysema.                             X-Ray Chest 1 View (Final result)  Result time 05/21/17 08:03:02    Final result by Eric Salomon MD (05/21/17 08:03:02)                 Impression:     See above.      Electronically signed by: ERIC SALOMON  Date:     05/21/17  Time:    08:03              Narrative:    Chest x-ray single view    Indication: Shortness of breath.    Findings: Comparison study 5/20/2017.  No change.                             US Abdomen Limited (Final result)  Result time 05/20/17 12:54:42    Final result by Hussein Simon MD (05/20/17 12:54:42)                 Impression:           1.  1.7 cm right hepatic lobe cyst.  2.  Mild gallbladder wall thickening, without gallstones, pericholecystic fluid or sonographic  Newsome sign.  Chronic cholecystitis could cause this appearance.  3.  Otherwise, normal study.      Electronically signed by: LAKISHA RITCHIE MD  Date:     05/20/17  Time:    12:54              Narrative:    Right quadrant ultrasound, color-flow and spectral doppler interrogation    History:    Acute encephalopathy with elevated Ammonia.  Suspect fibrosis/cirrhosis but no ascites..  Abnormal results of liver function studies.    Findings:   No comparison studies are available.  There is a 1.7 cm right hepatic lobe cyst. The liver is otherwise normal, and measures 13.5 cm. The gallbladder is normal.  Mild gallbladder wall thickening. Negative for sonographic Newsome's sign.  The common duct measures 4 mm. The right kidney measures 10.5 cm.  It is without focal solid or cystic masses, hydronephrosis, perinephric fluid collections or shadowing stones. The visualized portions of the pancreas are normal.  The aorta and IVC are normal in caliber.  Hepatopedal flow is documented in the portal vein.  Negative for ascites.    Hepatorenal index is not recorded.                             X-Ray Chest 1 View (Final result)  Result time 05/20/17 09:15:27    Final result by David Elliott Jr., MD (05/20/17 09:15:27)                 Impression:     No significant interval change.      Electronically signed by: DAVID ELLIOTT  Date:     05/20/17  Time:    09:15              Narrative:    Exam: Portable chest radiograph    History:    Shortness of breath.    Comparison: 5/19/2017    Findings: Lungs are clear with prominent interstitium similar as before.  Enteric tube is in the stomach.  Cardiac silhouette and mediastinum within normal limits.  No effusion or pneumothorax.                             X-Ray Chest 1 View (Final result)  Result time 05/19/17 20:37:23    Final result by CHILANGO Rodriguez Sr., MD (05/19/17 20:37:23)                 Impression:        1.  There is a mild amount of interstitial opacities seen in both  lungs with Kerley B-lines visualized bilaterally.  This is characteristic of pulmonary edema.  2.  An NG tube remains in place.    3.  There is surgical hardware in the proximal portion of the left humerus.        Electronically signed by: CHILANGO RODRIGUEZ MD  Date:     05/19/17  Time:    20:37              Narrative:    One-view chest x-ray    Clinical History: Hypoxia    Finding: Comparison was made to a prior examination performed on 5/18/2017.  An NG tube remains in place.  The size of the heart is normal.  There is a mild amount of interstitial opacities seen in both lungs with Kerley B-lines visualized bilaterally.  There is no pneumothorax.  The costophrenic angles are sharp.  There is surgical hardware in the proximal portion of the left humerus.                             X-Ray Chest 1 View (Final result)  Result time 05/18/17 11:49:18    Final result by James Caruso MD (05/18/17 11:49:18)                 Impression:      Please see above.      Electronically signed by: JAMES CARUSO MD  Date:     05/18/17  Time:    11:49              Narrative:    Exam: XR CHEST 1 VIEW,    Date:  05/18/17 11:04:25    History: NG tube placement    Comparison:  Earlier film from the same day.    Findings: Nasogastric tube has been placed with the distal tip well below the GE junction in good position.  Otherwise no change.                             CT Head Without Contrast (Final result)  Result time 05/18/17 10:46:24    Final result by James Caruso MD (05/18/17 10:46:24)                 Impression:         Negative noncontrast CT scan of the brain.         All CT scans at this facility use dose modulation, iterative reconstruction, and/or weight based dosing when appropriate to reduce radiation dose to as low as reasonably achievable.       Electronically signed by: JAMES CARUSO MD  Date:     05/18/17  Time:    10:46              Narrative:    CT HEAD WITHOUT CONTRAST    Date: 05/18/17  10:36:40    Clinical indication: Altered mental status.  Nonresponsive patient.     Technique:  Standard noncontrast CT scan of the brain. No previous for comparison.     Findings:  The ventricles are nondilated. Gray and white matter structures reveal normal attenuation. No hemorrhage, mass effect or edema is identified.     The skull and skull base are unremarkable.                             X-Ray Chest 1 View (Final result)  Result time 05/18/17 08:02:46    Final result by James Caruso MD (05/18/17 08:02:46)                 Impression:      Please see above.      Electronically signed by: JAMES CARUSO MD  Date:     05/18/17  Time:    08:02              Narrative:    Exam: XR CHEST 1 VIEW,    Date:  05/18/17 05:34:00    History: SOB    Comparison:  05/14/2017.    Findings: Heart size is normal.  No pneumothorax.  Reticulonodular lung disease is slightly improved.  Postoperative changes left humeral neck.                             CTA Chest Non-Coronary (Final result)  Result time 05/14/17 10:56:46    Final result by Dustin Caal MD (05/14/17 10:56:46)                 Impression:      Negative for pulmonary embolus.    Patchy right upper lobe and right lower lobe interstitial infiltrate/pneumonia with some nodularity at the dependent right lower lobe.  No pleural effusion.  Short term imaging followup after appropriate treatment is recommended.    Mediastinal adenopathy, perhaps reactive.  Attention on followup.    Emphysema.    Moderate stenosis proximal celiac artery.          All CT scans at this facility use dose modulation, iterative reconstruction, and/or weight based dosing when appropriate to reduce radiation dose to as low as reasonably achievable.      Electronically signed by: DUSTIN CAAL MD  Date:     05/14/17  Time:    10:56              Narrative:    EXAM: CTA CHEST NON CORONARY    CLINICAL HISTORY: shortness of breath    TECHNIQUE: CT angiogram performed of the chest following IV  contrast administration to evaluate for pulmonary emboli. Multiplanar MIP reformations performed.    COMPARISON STUDIES: Chest x-ray May 14, 2017    FINDINGS:  No evidence of pulmonary embolus.  Minimal aortic atherosclerosis without aneurysm or dissection.    Marked emphysematous changes in the lungs with apical and paraseptal bullous changes.  Patchy interstitial infiltrate posterior right upper lobe and posterior right lower lobe.  Some areas of nodularity in the dependent right lung base within this area of interstitial thickening with largest area of nodularity measuring 1.9 cm.    Numerous enlarged mediastinal lymph nodes are noted with a representative subcarinal lymph node measuring 1.4 x 1.3 cm.    Right hepatic lobe cyst.  Moderate narrowing proximal segment celiac artery.                             X-Ray Chest 1 View (Final result)  Result time 05/14/17 09:11:48    Final result by David Caal MD (05/14/17 09:11:48)                 Impression:      Diffuse chronic interstitial prominence with improving right upper lobe infiltrate with a small amount of residual right upper lobe interstitial markings remaining from prior chest x-ray.  Continued short-term followup recommended.      Electronically signed by: DAVID CAAL MD  Date:     05/14/17  Time:    09:11              Narrative:    EXAM: XR CHEST 1 VIEW    CLINICAL HISTORY: Shortness of breath.    COMPARISON STUDIES: December 22, 2016    FINDINGS:  The cardiomediastinal silhouette is within normal limits.  Diffuse mild interstitial prominence throughout the lungs with minimal asymmetric markings in the right upper lobe, though improved from prior exam.  Surgical plate proximal left humerus.

## 2017-05-26 NOTE — PLAN OF CARE
CM informed by Micah with [facility] of patient's denial; CM also  Receive call from Paul Henderson notified of denial. Denials discussed with patient's spouse and MD Suarez. Discussed HH with SN,PT,OT and spouse agrees. Spouse has no preference for HH, [facility]  notified of referral and anticipated discharge over the weekend. Spoke to Jen Portillo. [facility]  unable to accept due to insurance provider. CM placed call to [facility], spoke to Faye, accepts patient's insurance and able to meet SN,PT,OT post hospital needs. Informed. Faye [phone] anticipate discharge this weekend.  Discussed [facility] with spouse. Patient information via [facility]     05/26/17 1650   Discharge Reassessment   Assessment Type Discharge Planning Reassessment   Can the patient answer the patient profile reliably? Yes, cognitively intact   How does the patient rate their overall health at the present time? Good   Describe the patient's ability to walk at the present time. Minor restrictions or changes   How often would a person be available to care for the patient? Whenever needed   Discharge plan remains the same: No   Provided patient/caregiver education on the expected discharge date and the discharge plan Yes   Discharge Plan A Home Health;Home with family   Discharge Plan B Home Health;Home with family   Patient choice form signed by patient/caregiver Yes   Explained to the the patient/caregiver why the discharge planned changed: Yes   Involved the patient/caregiver in establishing a new discharge plan: Yes

## 2017-05-27 VITALS
RESPIRATION RATE: 20 BRPM | SYSTOLIC BLOOD PRESSURE: 92 MMHG | TEMPERATURE: 98 F | OXYGEN SATURATION: 96 % | HEIGHT: 64 IN | DIASTOLIC BLOOD PRESSURE: 67 MMHG | WEIGHT: 108.25 LBS | HEART RATE: 118 BPM | BODY MASS INDEX: 18.48 KG/M2

## 2017-05-27 PROBLEM — J96.21 ACUTE ON CHRONIC RESPIRATORY FAILURE WITH HYPOXIA: Status: RESOLVED | Noted: 2017-05-14 | Resolved: 2017-05-27

## 2017-05-27 PROBLEM — F05 DELIRIUM DUE TO ANOTHER MEDICAL CONDITION, ACUTE, HYPERACTIVE: Status: RESOLVED | Noted: 2017-05-17 | Resolved: 2017-05-27

## 2017-05-27 LAB
ALBUMIN SERPL BCP-MCNC: 2.7 G/DL
ANION GAP SERPL CALC-SCNC: 8 MMOL/L
BUN SERPL-MCNC: 5 MG/DL
CALCIUM SERPL-MCNC: 9 MG/DL
CHLORIDE SERPL-SCNC: 103 MMOL/L
CO2 SERPL-SCNC: 30 MMOL/L
CREAT SERPL-MCNC: 0.7 MG/DL
EST. GFR  (AFRICAN AMERICAN): >60 ML/MIN/1.73 M^2
EST. GFR  (NON AFRICAN AMERICAN): >60 ML/MIN/1.73 M^2
GLUCOSE SERPL-MCNC: 99 MG/DL
PHOSPHATE SERPL-MCNC: 4 MG/DL
POTASSIUM SERPL-SCNC: 4.1 MMOL/L
SODIUM SERPL-SCNC: 141 MMOL/L

## 2017-05-27 PROCEDURE — 25000003 PHARM REV CODE 250: Performed by: NURSE PRACTITIONER

## 2017-05-27 PROCEDURE — 36415 COLL VENOUS BLD VENIPUNCTURE: CPT

## 2017-05-27 PROCEDURE — 80069 RENAL FUNCTION PANEL: CPT

## 2017-05-27 PROCEDURE — 63600175 PHARM REV CODE 636 W HCPCS: Performed by: HOSPITALIST

## 2017-05-27 PROCEDURE — 25000003 PHARM REV CODE 250: Performed by: INTERNAL MEDICINE

## 2017-05-27 PROCEDURE — 92526 ORAL FUNCTION THERAPY: CPT

## 2017-05-27 PROCEDURE — 25000003 PHARM REV CODE 250: Performed by: HOSPITALIST

## 2017-05-27 PROCEDURE — 97110 THERAPEUTIC EXERCISES: CPT

## 2017-05-27 PROCEDURE — 25000242 PHARM REV CODE 250 ALT 637 W/ HCPCS: Performed by: NURSE PRACTITIONER

## 2017-05-27 PROCEDURE — 99231 SBSQ HOSP IP/OBS SF/LOW 25: CPT | Mod: ,,, | Performed by: INTERNAL MEDICINE

## 2017-05-27 RX ORDER — CLOPIDOGREL BISULFATE 75 MG/1
75 TABLET ORAL DAILY
Qty: 30 TABLET | Refills: 1 | Status: SHIPPED | OUTPATIENT
Start: 2017-05-27 | End: 2017-06-21 | Stop reason: SINTOL

## 2017-05-27 RX ORDER — ASPIRIN 81 MG/1
81 TABLET ORAL DAILY
Refills: 0 | COMMUNITY
Start: 2017-05-27 | End: 2018-05-27

## 2017-05-27 RX ORDER — LISINOPRIL 2.5 MG/1
2.5 TABLET ORAL DAILY
Qty: 30 TABLET | Refills: 1 | Status: SHIPPED | OUTPATIENT
Start: 2017-05-27 | End: 2017-07-24 | Stop reason: SDUPTHER

## 2017-05-27 RX ORDER — CARVEDILOL 3.12 MG/1
3.12 TABLET ORAL 2 TIMES DAILY
Qty: 60 TABLET | Refills: 1 | Status: SHIPPED | OUTPATIENT
Start: 2017-05-27 | End: 2017-07-24 | Stop reason: SDUPTHER

## 2017-05-27 RX ORDER — ATORVASTATIN CALCIUM 40 MG/1
40 TABLET, FILM COATED ORAL DAILY
Qty: 30 TABLET | Refills: 1 | Status: SHIPPED | OUTPATIENT
Start: 2017-05-27 | End: 2017-07-24 | Stop reason: SDUPTHER

## 2017-05-27 RX ADMIN — ATORVASTATIN CALCIUM 40 MG: 40 TABLET, FILM COATED ORAL at 09:05

## 2017-05-27 RX ADMIN — CLOPIDOGREL BISULFATE 75 MG: 75 TABLET ORAL at 09:05

## 2017-05-27 RX ADMIN — BUDESONIDE 0.5 MG: 0.5 SUSPENSION RESPIRATORY (INHALATION) at 07:05

## 2017-05-27 RX ADMIN — NICOTINE 1 PATCH: 7 PATCH, EXTENDED RELEASE TRANSDERMAL at 09:05

## 2017-05-27 RX ADMIN — IPRATROPIUM BROMIDE AND ALBUTEROL SULFATE 3 ML: .5; 3 SOLUTION RESPIRATORY (INHALATION) at 07:05

## 2017-05-27 RX ADMIN — CARVEDILOL 3.12 MG: 3.12 TABLET, FILM COATED ORAL at 09:05

## 2017-05-27 RX ADMIN — ARFORMOTEROL TARTRATE 15 MCG: 15 SOLUTION RESPIRATORY (INHALATION) at 07:05

## 2017-05-27 RX ADMIN — ASPIRIN 81 MG: 81 TABLET, COATED ORAL at 09:05

## 2017-05-27 RX ADMIN — FAMOTIDINE 20 MG: 20 TABLET ORAL at 09:05

## 2017-05-27 NOTE — PT/OT/SLP PROGRESS
Speech Language Pathology  Treatment    Jeniffer Fernandez   MRN: [id number]   Admitting Diagnosis: Acute on chronic respiratory failure with hypoxia    Diet recommendations: Solid Diet Level: Mechanical soft  Liquid Diet Level: Thin HOB to 90 degrees and Small bites/sips    SLP Treatment Date: 05/27/17  Speech Start Time: 0756     Speech Stop Time: 0815     Speech Total (min): 19 min       TREATMENT BILLABLE MINUTES:  Treatment Swallowing Dysfunction 19    Has the patient been evaluated by SLP for swallowing? : Yes  Keep patient NPO?: No   General Precautions: Standard, aspiration  Current Respiratory Status: nasal cannula       Subjective:  Pt alert and seen at bedside    Pain/Comfort  Pain Rating 1: 0/10  Pain Rating Post-Intervention 1: 0/10    Objective:   Patient found with: oxygen, telemetry    Observed po intake of breakfast consisting of cereal and milk with banana. Pt exhibited an inconsistent cough and throat clear with trials. Pt did not exhibit any other s/s of aspiration.     Assessment:  Jeniffer Fernandez is a 57 y.o. female with a medical diagnosis of Acute on chronic respiratory failure with hypoxia and presents with pharyngeal dysphagia.    Discharge recommendations: Discharge Facility/Level Of Care Needs: nursing facility, skilled     Goals:    SLP Goals        Problem: SLP Goal    Goal Priority Disciplines Outcome   SLP Goal     SLP Ongoing (interventions implemented as appropriate)   Description:  1.  ST to complete an ongoing swallow assessment with MBSS if warranted to establish po readiness  2. Pt will complete oral and pharyngeal ex's with min A for increased swallow strength and coordination  3. Pt will tolerate least restrictive diet without incidence                     Plan:   Patient to be seen Therapy Frequency: 2 x/week   Plan of Care expires: 05/30/17  Plan of Care reviewed with: patient  SLP Follow-up?: Yes              Karolyn Chin CCC-SLP  05/27/2017

## 2017-05-27 NOTE — SUBJECTIVE & OBJECTIVE
Interval History:  Persistent diplopia.    Review of Systems   Constitutional: Negative for activity change and appetite change.   HENT: Negative for congestion and dental problem.    Eyes: Positive for visual disturbance.   Respiratory: Negative for cough and chest tightness.    Cardiovascular: Negative for chest pain and leg swelling.   Gastrointestinal: Negative for abdominal distention and abdominal pain.   Endocrine: Negative for cold intolerance.   Neurological: Negative for dizziness.     Objective:     Vital Signs (Most Recent):  Temp: 98.4 °F (36.9 °C) (05/26/17 1942)  Pulse: (!) 111 (05/26/17 1946)  Resp: 18 (05/26/17 1946)  BP: 92/68 (05/26/17 1942)  SpO2: (!) 94 % (05/26/17 1946) Vital Signs (24h Range):  Temp:  [97.5 °F (36.4 °C)-98.7 °F (37.1 °C)] 98.4 °F (36.9 °C)  Pulse:  [] 111  Resp:  [16-20] 18  SpO2:  [90 %-100 %] 94 %  BP: ()/(58-84) 92/68     Weight: 49.1 kg (108 lb 3.9 oz)  Body mass index is 18.58 kg/m².    Intake/Output Summary (Last 24 hours) at 05/26/17 2219  Last data filed at 05/26/17 1500   Gross per 24 hour   Intake              560 ml   Output                0 ml   Net              560 ml      Physical Exam   Constitutional: She appears well-developed and well-nourished. No distress.   HENT:   Head: Normocephalic and atraumatic.   Mouth/Throat: Oropharynx is clear and moist.   Eyes: Conjunctivae and EOM are normal. Pupils are equal, round, and reactive to light.   Neck: Neck supple. No JVD present. No thyromegaly present.   Cardiovascular: Normal rate and regular rhythm.  Exam reveals no gallop and no friction rub.    No murmur heard.  Pulmonary/Chest: She has wheezes. She has no rales.   Coarse crackles bilaterally.  Expiratory wheezes and generally reduced breath sounds.   Abdominal: Soft. Bowel sounds are normal. She exhibits no distension. There is no tenderness. There is no rebound and no guarding.   Musculoskeletal: Normal range of motion. She exhibits no edema or  deformity.   Lymphadenopathy:     She has no cervical adenopathy.   Neurological: She has normal reflexes.   Somnolent   Skin: Skin is warm and dry. No rash noted.   Nursing note and vitals reviewed.    Significant Labs:   A1C: No results for input(s): HGBA1C in the last 4320 hours.  ABGs: No results for input(s): PH, PCO2, HCO3, POCSATURATED, BE in the last 48 hours.  Bilirubin:     Recent Labs  Lab 05/19/17  0409 05/20/17  0400 05/21/17  0418 05/24/17  0730 05/24/17  0845   BILIDIR 0.1  --   --   --  0.2   BILITOT 0.3 0.3 0.3 0.3 0.3     Blood Culture: No results for input(s): LABBLOO in the last 48 hours.  BMP:     Recent Labs  Lab 05/25/17  1617 05/26/17  0613   GLU 95 107    140   K 4.1 4.4    103   CO2 32* 27   BUN 6 9   CREATININE 0.6 0.7   CALCIUM 9.4 9.0   MG 2.1  --      CBC:     Recent Labs  Lab 05/25/17 1617   WBC 9.87   HGB 12.2   HCT 38.1        CMP:     Recent Labs  Lab 05/25/17  1617 05/26/17  0613    140   K 4.1 4.4    103   CO2 32* 27   GLU 95 107   BUN 6 9   CREATININE 0.6 0.7   CALCIUM 9.4 9.0   ALBUMIN  --  2.6*   ANIONGAP 10 10   EGFRNONAA >60 >60     Cardiac Markers: No results for input(s): CKMB, MYOGLOBIN, BNP, TROPISTAT in the last 48 hours.  Coagulation: No results for input(s): INR, APTT in the last 48 hours.    Invalid input(s): PT  Lactic Acid: No results for input(s): LACTATE in the last 48 hours.  Lipase: No results for input(s): LIPASE in the last 48 hours.  Lipid Panel: No results for input(s): CHOL, HDL, LDLCALC, TRIG, CHOLHDL in the last 48 hours.    Significant Imaging:   Imaging Results          MRI Brain Without Contrast (Final result)  Result time 05/26/17 19:43:36    Final result by Chico Mcmanus MD (05/26/17 19:43:36)                 Impression:         Negative noncontrast enhanced MRI of the brain.      Electronically signed by: CHICO MCMANUS MD  Date:     05/26/17  Time:    19:43              Narrative:    MRI BRAIN WITHOUT  CONTRAST    Date: 05/26/17 19:00:23    History:  , Persistent Diplopia    Technique:  Standard multiplanar noncontrast sequences of the brain.  Comparison is made with previous CT scan of 05/18/2017.      Findings:  The ventricles are non-dilated. No definite edema, hemorrhage or mass effect is seen. No extra-axial fluid collection.     Skull base appears normal.  The orbits are normal in appearance.  The midline structures are normal in appearance.                             NM Myocardial Perfusion Spect Multi Pharmacologic (In process)                X-Ray Chest 1 View (Final result)  Result time 05/24/17 08:48:54    Final result by Edison Shrestha MD (05/24/17 08:48:54)                 Impression:     Worsening peripheral interstitial opacities in the lower lobes this has the appearance of interstitial edema.              Electronically signed by: EDISON SHRESTHA MD  Date:     05/24/17  Time:    08:48              Narrative:    Exam: XR CHEST 1 VIEW    Clinical History: Shortness of breath. Acute respiratory failure    Findings:     Worsening peripheral lower lobe of reticular interstitial opacities.  The right upper lobe interstitial infiltrate is unchanged. Lung hyperinflation with emphysema. The cardiac silhouette is within normal limits.  NG tube in the stomach.                             X-Ray Chest 1 View (Final result)  Result time 05/22/17 08:09:28    Final result by Edison Shrestha MD (05/22/17 08:09:28)                 Impression:     As above.            Electronically signed by: EDISON SHRESTHA MD  Date:     05/22/17  Time:    08:09              Narrative:    Exam: XR CHEST 1 VIEW    Clinical History: Shortness of breath. SOB    Findings:     Spell and fainting interstitial infiltrate in the right upper lobe.  Reticular interstitial opacities appear increased in the periphery of the lower lobes. The cardiac silhouette is within normal limits.  No pneumothorax.  NG tube in the stomach.  Emphysema.                              X-Ray Chest 1 View (Final result)  Result time 05/21/17 08:03:02    Final result by Eric Salomon MD (05/21/17 08:03:02)                 Impression:     See above.      Electronically signed by: ERIC SALOMON  Date:     05/21/17  Time:    08:03              Narrative:    Chest x-ray single view    Indication: Shortness of breath.    Findings: Comparison study 5/20/2017.  No change.                             US Abdomen Limited (Final result)  Result time 05/20/17 12:54:42    Final result by Hussein Simon MD (05/20/17 12:54:42)                 Impression:           1.  1.7 cm right hepatic lobe cyst.  2.  Mild gallbladder wall thickening, without gallstones, pericholecystic fluid or sonographic Newsome sign.  Chronic cholecystitis could cause this appearance.  3.  Otherwise, normal study.      Electronically signed by: HUSSEIN SIMON MD  Date:     05/20/17  Time:    12:54              Narrative:    Right quadrant ultrasound, color-flow and spectral doppler interrogation    History:    Acute encephalopathy with elevated Ammonia.  Suspect fibrosis/cirrhosis but no ascites..  Abnormal results of liver function studies.    Findings:   No comparison studies are available.  There is a 1.7 cm right hepatic lobe cyst. The liver is otherwise normal, and measures 13.5 cm. The gallbladder is normal.  Mild gallbladder wall thickening. Negative for sonographic Newsome's sign.  The common duct measures 4 mm. The right kidney measures 10.5 cm.  It is without focal solid or cystic masses, hydronephrosis, perinephric fluid collections or shadowing stones. The visualized portions of the pancreas are normal.  The aorta and IVC are normal in caliber.  Hepatopedal flow is documented in the portal vein.  Negative for ascites.    Hepatorenal index is not recorded.                             X-Ray Chest 1 View (Final result)  Result time 05/20/17 09:15:27    Final result by Dustin De La Cruz Jr., MD (05/20/17 09:15:27)                  Impression:     No significant interval change.      Electronically signed by: DAVID ELLIOTT  Date:     05/20/17  Time:    09:15              Narrative:    Exam: Portable chest radiograph    History:    Shortness of breath.    Comparison: 5/19/2017    Findings: Lungs are clear with prominent interstitium similar as before.  Enteric tube is in the stomach.  Cardiac silhouette and mediastinum within normal limits.  No effusion or pneumothorax.                             X-Ray Chest 1 View (Final result)  Result time 05/19/17 20:37:23    Final result by CHILANGO Rodriguez Sr., MD (05/19/17 20:37:23)                 Impression:        1.  There is a mild amount of interstitial opacities seen in both lungs with Kerley B-lines visualized bilaterally.  This is characteristic of pulmonary edema.  2.  An NG tube remains in place.    3.  There is surgical hardware in the proximal portion of the left humerus.        Electronically signed by: CHILANGO RODRIGUEZ MD  Date:     05/19/17  Time:    20:37              Narrative:    One-view chest x-ray    Clinical History: Hypoxia    Finding: Comparison was made to a prior examination performed on 5/18/2017.  An NG tube remains in place.  The size of the heart is normal.  There is a mild amount of interstitial opacities seen in both lungs with Kerley B-lines visualized bilaterally.  There is no pneumothorax.  The costophrenic angles are sharp.  There is surgical hardware in the proximal portion of the left humerus.                             X-Ray Chest 1 View (Final result)  Result time 05/18/17 11:49:18    Final result by Adam Caruso MD (05/18/17 11:49:18)                 Impression:      Please see above.      Electronically signed by: ADAM CARUSO MD  Date:     05/18/17  Time:    11:49              Narrative:    Exam: XR CHEST 1 VIEW,    Date:  05/18/17 11:04:25    History: NG tube placement    Comparison:  Earlier film from the same day.    Findings:  Nasogastric tube has been placed with the distal tip well below the GE junction in good position.  Otherwise no change.                             CT Head Without Contrast (Final result)  Result time 05/18/17 10:46:24    Final result by James Caruso MD (05/18/17 10:46:24)                 Impression:         Negative noncontrast CT scan of the brain.         All CT scans at this facility use dose modulation, iterative reconstruction, and/or weight based dosing when appropriate to reduce radiation dose to as low as reasonably achievable.       Electronically signed by: JAMES CARUSO MD  Date:     05/18/17  Time:    10:46              Narrative:    CT HEAD WITHOUT CONTRAST    Date: 05/18/17 10:36:40    Clinical indication: Altered mental status.  Nonresponsive patient.     Technique:  Standard noncontrast CT scan of the brain. No previous for comparison.     Findings:  The ventricles are nondilated. Gray and white matter structures reveal normal attenuation. No hemorrhage, mass effect or edema is identified.     The skull and skull base are unremarkable.                             X-Ray Chest 1 View (Final result)  Result time 05/18/17 08:02:46    Final result by James Caruso MD (05/18/17 08:02:46)                 Impression:      Please see above.      Electronically signed by: JAMES CARUSO MD  Date:     05/18/17  Time:    08:02              Narrative:    Exam: XR CHEST 1 VIEW,    Date:  05/18/17 05:34:00    History: SOB    Comparison:  05/14/2017.    Findings: Heart size is normal.  No pneumothorax.  Reticulonodular lung disease is slightly improved.  Postoperative changes left humeral neck.                             CTA Chest Non-Coronary (Final result)  Result time 05/14/17 10:56:46    Final result by Dustin Caal MD (05/14/17 10:56:46)                 Impression:      Negative for pulmonary embolus.    Patchy right upper lobe and right lower lobe interstitial infiltrate/pneumonia with  some nodularity at the dependent right lower lobe.  No pleural effusion.  Short term imaging followup after appropriate treatment is recommended.    Mediastinal adenopathy, perhaps reactive.  Attention on followup.    Emphysema.    Moderate stenosis proximal celiac artery.          All CT scans at this facility use dose modulation, iterative reconstruction, and/or weight based dosing when appropriate to reduce radiation dose to as low as reasonably achievable.      Electronically signed by: DVAID CAAL MD  Date:     05/14/17  Time:    10:56              Narrative:    EXAM: CTA CHEST NON CORONARY    CLINICAL HISTORY: shortness of breath    TECHNIQUE: CT angiogram performed of the chest following IV contrast administration to evaluate for pulmonary emboli. Multiplanar MIP reformations performed.    COMPARISON STUDIES: Chest x-ray May 14, 2017    FINDINGS:  No evidence of pulmonary embolus.  Minimal aortic atherosclerosis without aneurysm or dissection.    Marked emphysematous changes in the lungs with apical and paraseptal bullous changes.  Patchy interstitial infiltrate posterior right upper lobe and posterior right lower lobe.  Some areas of nodularity in the dependent right lung base within this area of interstitial thickening with largest area of nodularity measuring 1.9 cm.    Numerous enlarged mediastinal lymph nodes are noted with a representative subcarinal lymph node measuring 1.4 x 1.3 cm.    Right hepatic lobe cyst.  Moderate narrowing proximal segment celiac artery.                             X-Ray Chest 1 View (Final result)  Result time 05/14/17 09:11:48    Final result by David Caal MD (05/14/17 09:11:48)                 Impression:      Diffuse chronic interstitial prominence with improving right upper lobe infiltrate with a small amount of residual right upper lobe interstitial markings remaining from prior chest x-ray.  Continued short-term followup recommended.      Electronically signed  by: DAVID ODOM MD  Date:     05/14/17  Time:    09:11              Narrative:    EXAM: XR CHEST 1 VIEW    CLINICAL HISTORY: Shortness of breath.    COMPARISON STUDIES: December 22, 2016    FINDINGS:  The cardiomediastinal silhouette is within normal limits.  Diffuse mild interstitial prominence throughout the lungs with minimal asymmetric markings in the right upper lobe, though improved from prior exam.  Surgical plate proximal left humerus.

## 2017-05-27 NOTE — PROGRESS NOTES
D/C IV, catheter in tact.D/C tele monitor  Pt given prescription information and encouraged to follow up as directed.   Pt verbalize understanding of discharge instructions, both written and verbal.

## 2017-05-27 NOTE — PROGRESS NOTES
Cardiology Progress Note        SUBJECTIVE:     History of Present Illness:  Pt is sitting on the bed, oriented and alert. C/o double vision.  No chest pain, dyspnea and dizziness,       OBJECTIVE:     Vital Signs (Most Recent)  Temp: 98.2 °F (36.8 °C) (05/27/17 0952)  Pulse: (!) 118 (05/27/17 0952)  Resp: 20 (05/27/17 0952)  BP: 92/67 (05/27/17 0952)  SpO2: 96 % (05/27/17 0952)    Vital Signs Range (Last 24H):  Temp:  [97.5 °F (36.4 °C)-98.7 °F (37.1 °C)]   Pulse:  []   Resp:  [16-20]   BP: (87-96)/(54-68)   SpO2:  [92 %-100 %]     Intake/Output last 3 shifts:  I/O last 3 completed shifts:  In: 680 [P.O.:680]  Out: -     Intake/Output this shift:  No intake/output data recorded.    Review of patient's allergies indicates:   Allergen Reactions    Codeine Itching    Codeine phosphate      Itchy (skin)^       Current Facility-Administered Medications   Medication    acetaminophen tablet 650 mg    albuterol-ipratropium 2.5mg-0.5mg/3mL nebulizer solution 3 mL    aluminum-magnesium hydroxide-simethicone 200-200-20 mg/5 mL suspension 30 mL    arformoterol nebulizer solution 15 mcg    aspirin EC tablet 81 mg    atorvastatin tablet 40 mg    budesonide nebulizer solution 0.5 mg    carvedilol tablet 3.125 mg    clopidogrel tablet 75 mg    enoxaparin injection 40 mg    famotidine tablet 20 mg    lisinopril tablet 2.5 mg    nicotine 7 mg/24 hr 1 patch    ondansetron injection 4 mg     Current Outpatient Prescriptions   Medication Sig    albuterol (PROVENTIL HFA) 90 mcg/actuation inhaler Inhale 2 puffs into the lungs every 6 (six) hours as needed for Wheezing. Rescue    lorazepam (ATIVAN) 0.5 MG tablet Take 0.5 mg by mouth every 6 (six) hours as needed for Anxiety.    montelukast (SINGULAIR) 10 mg tablet Take 10 mg by mouth every evening.    paroxetine (PAXIL) 20 MG tablet Take 40 mg by mouth every morning.     umeclidinium (INCRUSE ELLIPTA) 62.5 mcg/actuation DsDv Inhale 62.5 mcg into the lungs once  daily. Controller    aspirin (ECOTRIN) 81 MG EC tablet Take 1 tablet (81 mg total) by mouth once daily.    atorvastatin (LIPITOR) 40 MG tablet Take 1 tablet (40 mg total) by mouth once daily.    b complex vitamins tablet Take 1 tablet by mouth once daily.    carvedilol (COREG) 3.125 MG tablet Take 1 tablet (3.125 mg total) by mouth 2 (two) times daily.    clopidogrel (PLAVIX) 75 mg tablet Take 1 tablet (75 mg total) by mouth once daily.    docusate sodium (COLACE) 100 MG capsule Take 1 capsule (100 mg total) by mouth 2 (two) times daily.    fluticasone (FLONASE) 50 mcg/actuation nasal spray 2 sprays by Each Nare route once daily.    lisinopril (PRINIVIL,ZESTRIL) 2.5 MG tablet Take 1 tablet (2.5 mg total) by mouth once daily.    mometasone-formoterol (DULERA) 200-5 mcg/actuation inhaler Inhale 2 puffs into the lungs 2 (two) times daily.    nicotine (NICODERM CQ) 21 mg/24 hr Place 1 patch onto the skin once daily.     No current facility-administered medications on file prior to encounter.      Current Outpatient Prescriptions on File Prior to Encounter   Medication Sig    lorazepam (ATIVAN) 0.5 MG tablet Take 0.5 mg by mouth every 6 (six) hours as needed for Anxiety.    montelukast (SINGULAIR) 10 mg tablet Take 10 mg by mouth every evening.    paroxetine (PAXIL) 20 MG tablet Take 40 mg by mouth every morning.     [DISCONTINUED] amitriptyline (ELAVIL) 50 MG tablet Take 1 tablet (50 mg total) by mouth every evening. (Patient taking differently: Take 25 mg by mouth 3 (three) times daily. )    [DISCONTINUED] predniSONE (DELTASONE) 10 MG tablet Please take 20mg twice daily x 3 days, then take 10 mg twice daily x 2 days, then take 10 mg once daily x 2 days    [DISCONTINUED] ropinirole (REQUIP) 1 MG tablet Take 1 mg by mouth every evening.    b complex vitamins tablet Take 1 tablet by mouth once daily.    docusate sodium (COLACE) 100 MG capsule Take 1 capsule (100 mg total) by mouth 2 (two) times daily.     fluticasone (FLONASE) 50 mcg/actuation nasal spray 2 sprays by Each Nare route once daily.    mometasone-formoterol (DULERA) 200-5 mcg/actuation inhaler Inhale 2 puffs into the lungs 2 (two) times daily.    nicotine (NICODERM CQ) 21 mg/24 hr Place 1 patch onto the skin once daily.       Physical Exam:  General appearance: alert, appears stated age and cooperative  Head: Normocephalic, without obvious abnormality, atraumatic  Eyes:  conjunctivae/corneas clear. PERRL, EOM's intact. Fundi benign.  Nose: no discharge  Throat: normal findings: tongue midline and normal  Neck: no adenopathy, no carotid bruit, no JVD, supple, symmetrical, trachea midline and thyroid not enlarged, symmetric, no tenderness/mass/nodules  Back:  no skin lesions, erythema, or scars  Lungs:  clear to auscultation bilaterally  Chest wall: no tenderness  Heart: regular rate and rhythm, S1, S2 normal, no murmur, click, rub or gallop  Abdomen: soft, non-tender; bowel sounds normal; no masses,  no organomegaly  Extremities: extremities normal, atraumatic, no cyanosis or edema  Pulses: 2+ and symmetric  Skin: Skin color, texture, turgor normal. No rashes or lesions  Neurologic: Grossly normal    Laboratory:  Chemistry:   Lab Results   Component Value Date     05/27/2017    K 4.1 05/27/2017     05/27/2017    CO2 30 (H) 05/27/2017    BUN 5 (L) 05/27/2017    CREATININE 0.7 05/27/2017    CALCIUM 9.0 05/27/2017     Cardiac Markers:   Lab Results   Component Value Date    TROPONINI 0.100 (H) 05/20/2017     Cardiac Markers (Last 3):   Lab Results   Component Value Date    TROPONINI 0.100 (H) 05/20/2017    TROPONINI 0.107 (H) 05/19/2017    TROPONINI 0.128 (H) 05/19/2017     CBC:   Lab Results   Component Value Date    WBC 9.87 05/25/2017    HGB 12.2 05/25/2017    HCT 38.1 05/25/2017     (H) 05/25/2017     05/25/2017     Lipids: No results found for: CHOL, TRIG, HDL, LDLDIRECT  Coagulation:   Lab Results   Component Value Date     INR 1.0 05/20/2017    APTT 37.1 (H) 05/23/2017       Diagnostic Results:  ECG: Reviewed  X-Ray: Reviewed  US: Reviewed  CT: Reviewed  Echo: Reviewed      ASSESSMENT/PLAN:     Patient Active Problem List   Diagnosis    Closed fracture of proximal end of left humerus    Fracture of proximal humerus    Pneumonia    Depression    Anxiety    Bipolar disorder    COPD, severe    Tobacco abuse    Abdominal pain    Lung infiltrate    COPD exacerbation    LFT elevation    Chronic systolic heart failure    Physical deconditioning    Oropharyngeal dysphagia    Diplopia      NSTEMI/ACS  Had nuclear stress test today, anterior small to moderate sized infarct with damon ischemia. EF 25%.  CHF compensated  AMS resolved.  Question h/o alcohol abuse  COPD   Plan:   continue ASA, liptior 40 mg daily, coreg and Plavix.  Lisinopril 2.5 mg daily  F/u as op.

## 2017-05-27 NOTE — PLAN OF CARE
Problem: Patient Care Overview  Goal: Plan of Care Review  Outcome: Ongoing (interventions implemented as appropriate)  Pt is sinus rhythm, sinus tach on the monitor. Pt has some bruising and swelling on left hand from hitting the side rails. Pt is expected to be discharged in the morning. Pt has had an uneventful night and is resting quietly, will continue to monitor.

## 2017-05-27 NOTE — PT/OT/SLP PROGRESS
"Occupational Therapy  Treatment    Jeniffer Fernandez   MRN: [id number]   Admitting Diagnosis: Acute on chronic respiratory failure with hypoxia    OT Date of Treatment: 05/27/17   OT Start Time: 0845  OT Stop Time: 0900  OT Total Time (min): 15 min    Billable Minutes:  Therapeutic Exercise 15    General Precautions: Standard, aspiration  Orthopedic Precautions: N/A  Braces: N/A         Subjective:  Communicated with RN prior to session.    Pain/Comfort  Pain Rating 1: 0/10  Pain Rating Post-Intervention 1: 0/10  Pain Rating 2: 0/10  Pain Rating Post-Intervention 2: 0/10    Objective:  Patient found with: telemetry, peripheral IV, oxygen     Functional Mobility:  Bed Mobility:  Rolling/Turning to Left: Supervision  Rolling/Turning Right: Supervision  Scooting/Bridging: Supervision  Supine to Sit: Supervision  Sit to Supine: Supervision    Transfers:        Functional Ambulation: DID NO OCCUR        Balance:   Static Sit: GOOD-: Takes MODERATE challenges from all directions but inconsistently  Dynamic Sit: FAIR+: Maintains balance through MINIMAL excursions of active trunk motion    Therapeutic Activities and Exercises:  1X10 REPS MIN RESISTANCE THEREX IN ALL PLANES SEATED EOB SBA FOR INCREASE BUE STRENGTH AND ENDURANCE    AM-PAC 6 CLICK ADL   How much help from another person does this patient currently need?   1 = Unable, Total/Dependent Assistance  2 = A lot, Maximum/Moderate Assistance  3 = A little, Minimum/Contact Guard/Supervision  4 = None, Modified [place]/Independent    Putting on and taking off regular lower body clothing? : 3  Bathing (including washing, rinsing, drying)?: 1 (not tested)  Toileting, which includes using toilet, bedpan, or urinal? : 2  Putting on and taking off regular upper body clothing?: 3  Taking care of personal grooming such as brushing teeth?: 3  Eating meals?: 3  Total Score: 15     AM-PAC Raw Score CMS "G-Code Modifier Level of Impairment Assistance   6 % Total / Unable   7 " - 8 CM 80 - 100% Maximal Assist   9-13 CL 60 - 80% Moderate Assist   14 - 19 CK 40 - 60% Moderate Assist   20 - 22 CJ 20 - 40% Minimal Assist   23 CI 1-20% SBA / CGA   24 CH 0% Independent/ Mod I       Patient left supine with all lines intact, call button in reach, bed alarm on and RN notified    ASSESSMENT:  Jeniffer Fernandez is a 57 y.o. female with a medical diagnosis of Acute on chronic respiratory failure with hypoxia and presents with DEBILITY, IMPAIRED ADLS AND SAFETY. PT WILL BENEFIT FROM CONTINUED SKILLED OT SERVICES TO ADDRESS FUNCTIONAL DEFICITS.    Rehab identified problem list/impairments: Rehab identified problem list/impairments: weakness, impaired endurance, impaired self care skills, impaired functional mobilty, gait instability, impaired balance, decreased safety awareness    Rehab potential is good.    Activity tolerance: Good    Discharge recommendations:       Barriers to discharge: Barriers to Discharge: None    Equipment recommendations:       GOALS:    Occupational Therapy Goals        Problem: Occupational Therapy Goal    Goal Priority Disciplines Outcome Interventions   Occupational Therapy Goal     OT, PT/OT Ongoing (interventions implemented as appropriate)    Description:  OT GOALS TO BE MET BY 5-31-17  1. SBA WITH UE DRESSING  2. SBA WITH LE DRESSING  3. PT WILL TOLERATE 1 SET X 15 REPS B UE ROM EXERCISE WITH MIN RESISTANCE                    Plan:  Patient to be seen 3 x/week to address the above listed problems via self-care/home management, therapeutic activities, therapeutic exercises  Plan of Care expires: 05/31/17  Plan of Care reviewed with: patient         Aure Gilman, OT  05/27/2017

## 2017-05-27 NOTE — PLAN OF CARE
"Problem: Occupational Therapy Goal  Goal: Occupational Therapy Goal  OT GOALS TO BE MET BY 5-31-17  1. SBA WITH UE DRESSING  2. SBA WITH LE DRESSING  3. PT WILL TOLERATE 1 SET X 15 REPS B UE ROM EXERCISE WITH MIN RESISTANCE   Outcome: Ongoing (interventions implemented as appropriate)  Pt performed EOB exercises with min resistance 1x10 reps each in all planes, with reports of "this is giving me a workout!"  Pt left supine in bed with all lines intact, call button in lap.       "

## 2017-05-27 NOTE — PROGRESS NOTES
[facility] Hospital Medicine  Progress Note    Patient Name: Jeniffer Fernandez  MRN: [id number]  Patient Class: IP- Inpatient   Admission Date: 5/14/2017  Length of Stay: 12 days  Attending Physician: Lewis Suarez MD  Primary Care Provider: Ben Pelaez Jr, MD        Subjective:     Principal Problem:Acute on chronic respiratory failure with hypoxia    HPI:  Jeniffer Fernandez is a 58 yo female + smoker with COPD and chronic respiratory failure who presented with worsening SOB over 1 week. For 5 days she had worn oxygen continuously. She describes frequent productive cough, wheezing, SOB, generalized weakness and RFANCES. Prior to presentation she had subjective fever and sweats. Pt has had several neb treatments and still having SOB. In the ED, ABG reflects acute on chronic hypoxic respiratory failure and O2 sat 87 %. CTA of Chest indicated a patchy RUL/RLL interstitial infiltrate.     Hospital Course:  Very agitated late morning 16 May and received Haldol and Ativan and slept most of the day.  She was better the following morning but became more confused and agitated in the evening and combative with staff.  Increased wheezing and pulling her oxygen off with desaturations.  When stable her blood gas showed adequate oxygenation and a pCO2 only slightly high at 50.  Transferred to ICU for respiratory failure. Was started on Precedex that has now been discontinued.    5/18 - Continued to have intermittent periods of agitation. Negative head CT and Neurology consult placed.    5/19 - Evening agitation.    5/20 - Started heparin drip and aspirin per Cardiology recommendations.  Echo shows HF with reduced ejection fraction of 30% and slightly elevated troponin.    5/21 - Symptoms of agitation have improved.    5/22 - Patient with continued improved in mental status.    5/23 - Some intermittent agitation in the evening. Failed swallow evaluation.  5/25 - NMS showing fixed defect and reduced EF 25%.  Medical  management per Cardiology.    Interval History:  Persistent diplopia.    Review of Systems   Constitutional: Negative for activity change and appetite change.   HENT: Negative for congestion and dental problem.    Eyes: Positive for visual disturbance.   Respiratory: Negative for cough and chest tightness.    Cardiovascular: Negative for chest pain and leg swelling.   Gastrointestinal: Negative for abdominal distention and abdominal pain.   Endocrine: Negative for cold intolerance.   Neurological: Negative for dizziness.     Objective:     Vital Signs (Most Recent):  Temp: 98.4 °F (36.9 °C) (05/26/17 1942)  Pulse: (!) 111 (05/26/17 1946)  Resp: 18 (05/26/17 1946)  BP: 92/68 (05/26/17 1942)  SpO2: (!) 94 % (05/26/17 1946) Vital Signs (24h Range):  Temp:  [97.5 °F (36.4 °C)-98.7 °F (37.1 °C)] 98.4 °F (36.9 °C)  Pulse:  [] 111  Resp:  [16-20] 18  SpO2:  [90 %-100 %] 94 %  BP: ()/(58-84) 92/68     Weight: 49.1 kg (108 lb 3.9 oz)  Body mass index is 18.58 kg/m².    Intake/Output Summary (Last 24 hours) at 05/26/17 2219  Last data filed at 05/26/17 1500   Gross per 24 hour   Intake              560 ml   Output                0 ml   Net              560 ml      Physical Exam   Constitutional: She appears well-developed and well-nourished. No distress.   HENT:   Head: Normocephalic and atraumatic.   Mouth/Throat: Oropharynx is clear and moist.   Eyes: Conjunctivae and EOM are normal. Pupils are equal, round, and reactive to light.   Neck: Neck supple. No JVD present. No thyromegaly present.   Cardiovascular: Normal rate and regular rhythm.  Exam reveals no gallop and no friction rub.    No murmur heard.  Pulmonary/Chest: She has wheezes. She has no rales.   Coarse crackles bilaterally.  Expiratory wheezes and generally reduced breath sounds.   Abdominal: Soft. Bowel sounds are normal. She exhibits no distension. There is no tenderness. There is no rebound and no guarding.   Musculoskeletal: Normal range of  motion. She exhibits no edema or deformity.   Lymphadenopathy:     She has no cervical adenopathy.   Neurological: She has normal reflexes.   Somnolent   Skin: Skin is warm and dry. No rash noted.   Nursing note and vitals reviewed.    Significant Labs:   A1C: No results for input(s): HGBA1C in the last 4320 hours.  ABGs: No results for input(s): PH, PCO2, HCO3, POCSATURATED, BE in the last 48 hours.  Bilirubin:     Recent Labs  Lab 05/19/17  0409 05/20/17  0400 05/21/17  0418 05/24/17  0730 05/24/17  0845   BILIDIR 0.1  --   --   --  0.2   BILITOT 0.3 0.3 0.3 0.3 0.3     Blood Culture: No results for input(s): LABBLOO in the last 48 hours.  BMP:     Recent Labs  Lab 05/25/17  1617 05/26/17  0613   GLU 95 107    140   K 4.1 4.4    103   CO2 32* 27   BUN 6 9   CREATININE 0.6 0.7   CALCIUM 9.4 9.0   MG 2.1  --      CBC:     Recent Labs  Lab 05/25/17 1617   WBC 9.87   HGB 12.2   HCT 38.1        CMP:     Recent Labs  Lab 05/25/17  1617 05/26/17  0613    140   K 4.1 4.4    103   CO2 32* 27   GLU 95 107   BUN 6 9   CREATININE 0.6 0.7   CALCIUM 9.4 9.0   ALBUMIN  --  2.6*   ANIONGAP 10 10   EGFRNONAA >60 >60     Cardiac Markers: No results for input(s): CKMB, MYOGLOBIN, BNP, TROPISTAT in the last 48 hours.  Coagulation: No results for input(s): INR, APTT in the last 48 hours.    Invalid input(s): PT  Lactic Acid: No results for input(s): LACTATE in the last 48 hours.  Lipase: No results for input(s): LIPASE in the last 48 hours.  Lipid Panel: No results for input(s): CHOL, HDL, LDLCALC, TRIG, CHOLHDL in the last 48 hours.    Significant Imaging:   Imaging Results          MRI Brain Without Contrast (Final result)  Result time 05/26/17 19:43:36    Final result by Chico Mcmanus MD (05/26/17 19:43:36)                 Impression:         Negative noncontrast enhanced MRI of the brain.      Electronically signed by: CHICO MCMANUS MD  Date:     05/26/17  Time:    19:43               Narrative:    MRI BRAIN WITHOUT CONTRAST    Date: 05/26/17 19:00:23    History:  , Persistent Diplopia    Technique:  Standard multiplanar noncontrast sequences of the brain.  Comparison is made with previous CT scan of 05/18/2017.      Findings:  The ventricles are non-dilated. No definite edema, hemorrhage or mass effect is seen. No extra-axial fluid collection.     Skull base appears normal.  The orbits are normal in appearance.  The midline structures are normal in appearance.                             NM Myocardial Perfusion Spect Multi Pharmacologic (In process)                X-Ray Chest 1 View (Final result)  Result time 05/24/17 08:48:54    Final result by Edison Shrestha MD (05/24/17 08:48:54)                 Impression:     Worsening peripheral interstitial opacities in the lower lobes this has the appearance of interstitial edema.              Electronically signed by: EDISON SHRESTHA MD  Date:     05/24/17  Time:    08:48              Narrative:    Exam: XR CHEST 1 VIEW    Clinical History: Shortness of breath. Acute respiratory failure    Findings:     Worsening peripheral lower lobe of reticular interstitial opacities.  The right upper lobe interstitial infiltrate is unchanged. Lung hyperinflation with emphysema. The cardiac silhouette is within normal limits.  NG tube in the stomach.                             X-Ray Chest 1 View (Final result)  Result time 05/22/17 08:09:28    Final result by Edison Shrestha MD (05/22/17 08:09:28)                 Impression:     As above.            Electronically signed by: EDISON SHRESTHA MD  Date:     05/22/17  Time:    08:09              Narrative:    Exam: XR CHEST 1 VIEW    Clinical History: Shortness of breath. SOB    Findings:     Spell and fainting interstitial infiltrate in the right upper lobe.  Reticular interstitial opacities appear increased in the periphery of the lower lobes. The cardiac silhouette is within normal limits.  No pneumothorax.  NG tube in the  stomach.  Emphysema.                             X-Ray Chest 1 View (Final result)  Result time 05/21/17 08:03:02    Final result by Eric Salomon MD (05/21/17 08:03:02)                 Impression:     See above.      Electronically signed by: ERIC SALOMON  Date:     05/21/17  Time:    08:03              Narrative:    Chest x-ray single view    Indication: Shortness of breath.    Findings: Comparison study 5/20/2017.  No change.                             US Abdomen Limited (Final result)  Result time 05/20/17 12:54:42    Final result by Hussein Simon MD (05/20/17 12:54:42)                 Impression:           1.  1.7 cm right hepatic lobe cyst.  2.  Mild gallbladder wall thickening, without gallstones, pericholecystic fluid or sonographic Newsome sign.  Chronic cholecystitis could cause this appearance.  3.  Otherwise, normal study.      Electronically signed by: HUSSEIN SIMON MD  Date:     05/20/17  Time:    12:54              Narrative:    Right quadrant ultrasound, color-flow and spectral doppler interrogation    History:    Acute encephalopathy with elevated Ammonia.  Suspect fibrosis/cirrhosis but no ascites..  Abnormal results of liver function studies.    Findings:   No comparison studies are available.  There is a 1.7 cm right hepatic lobe cyst. The liver is otherwise normal, and measures 13.5 cm. The gallbladder is normal.  Mild gallbladder wall thickening. Negative for sonographic Newsome's sign.  The common duct measures 4 mm. The right kidney measures 10.5 cm.  It is without focal solid or cystic masses, hydronephrosis, perinephric fluid collections or shadowing stones. The visualized portions of the pancreas are normal.  The aorta and IVC are normal in caliber.  Hepatopedal flow is documented in the portal vein.  Negative for ascites.    Hepatorenal index is not recorded.                             X-Ray Chest 1 View (Final result)  Result time 05/20/17 09:15:27    Final result by Dustin De La Cruz  MD Michela (05/20/17 09:15:27)                 Impression:     No significant interval change.      Electronically signed by: DAVID ELLIOTT  Date:     05/20/17  Time:    09:15              Narrative:    Exam: Portable chest radiograph    History:    Shortness of breath.    Comparison: 5/19/2017    Findings: Lungs are clear with prominent interstitium similar as before.  Enteric tube is in the stomach.  Cardiac silhouette and mediastinum within normal limits.  No effusion or pneumothorax.                             X-Ray Chest 1 View (Final result)  Result time 05/19/17 20:37:23    Final result by CHILANGO Rodriguez Sr., MD (05/19/17 20:37:23)                 Impression:        1.  There is a mild amount of interstitial opacities seen in both lungs with Kerley B-lines visualized bilaterally.  This is characteristic of pulmonary edema.  2.  An NG tube remains in place.    3.  There is surgical hardware in the proximal portion of the left humerus.        Electronically signed by: CHILANGO RODRIGUEZ MD  Date:     05/19/17  Time:    20:37              Narrative:    One-view chest x-ray    Clinical History: Hypoxia    Finding: Comparison was made to a prior examination performed on 5/18/2017.  An NG tube remains in place.  The size of the heart is normal.  There is a mild amount of interstitial opacities seen in both lungs with Kerley B-lines visualized bilaterally.  There is no pneumothorax.  The costophrenic angles are sharp.  There is surgical hardware in the proximal portion of the left humerus.                             X-Ray Chest 1 View (Final result)  Result time 05/18/17 11:49:18    Final result by Adam Caruso MD (05/18/17 11:49:18)                 Impression:      Please see above.      Electronically signed by: ADAM CARUSO MD  Date:     05/18/17  Time:    11:49              Narrative:    Exam: XR CHEST 1 VIEW,    Date:  05/18/17 11:04:25    History: NG tube placement    Comparison:  Earlier film from  the same day.    Findings: Nasogastric tube has been placed with the distal tip well below the GE junction in good position.  Otherwise no change.                             CT Head Without Contrast (Final result)  Result time 05/18/17 10:46:24    Final result by James Caruso MD (05/18/17 10:46:24)                 Impression:         Negative noncontrast CT scan of the brain.         All CT scans at this facility use dose modulation, iterative reconstruction, and/or weight based dosing when appropriate to reduce radiation dose to as low as reasonably achievable.       Electronically signed by: JAMES CARUSO MD  Date:     05/18/17  Time:    10:46              Narrative:    CT HEAD WITHOUT CONTRAST    Date: 05/18/17 10:36:40    Clinical indication: Altered mental status.  Nonresponsive patient.     Technique:  Standard noncontrast CT scan of the brain. No previous for comparison.     Findings:  The ventricles are nondilated. Gray and white matter structures reveal normal attenuation. No hemorrhage, mass effect or edema is identified.     The skull and skull base are unremarkable.                             X-Ray Chest 1 View (Final result)  Result time 05/18/17 08:02:46    Final result by James Caruso MD (05/18/17 08:02:46)                 Impression:      Please see above.      Electronically signed by: JAMES CARUSO MD  Date:     05/18/17  Time:    08:02              Narrative:    Exam: XR CHEST 1 VIEW,    Date:  05/18/17 05:34:00    History: SOB    Comparison:  05/14/2017.    Findings: Heart size is normal.  No pneumothorax.  Reticulonodular lung disease is slightly improved.  Postoperative changes left humeral neck.                             CTA Chest Non-Coronary (Final result)  Result time 05/14/17 10:56:46    Final result by Dustin Caal MD (05/14/17 10:56:46)                 Impression:      Negative for pulmonary embolus.    Patchy right upper lobe and right lower lobe  interstitial infiltrate/pneumonia with some nodularity at the dependent right lower lobe.  No pleural effusion.  Short term imaging followup after appropriate treatment is recommended.    Mediastinal adenopathy, perhaps reactive.  Attention on followup.    Emphysema.    Moderate stenosis proximal celiac artery.          All CT scans at this facility use dose modulation, iterative reconstruction, and/or weight based dosing when appropriate to reduce radiation dose to as low as reasonably achievable.      Electronically signed by: DAVID CAAL MD  Date:     05/14/17  Time:    10:56              Narrative:    EXAM: CTA CHEST NON CORONARY    CLINICAL HISTORY: shortness of breath    TECHNIQUE: CT angiogram performed of the chest following IV contrast administration to evaluate for pulmonary emboli. Multiplanar MIP reformations performed.    COMPARISON STUDIES: Chest x-ray May 14, 2017    FINDINGS:  No evidence of pulmonary embolus.  Minimal aortic atherosclerosis without aneurysm or dissection.    Marked emphysematous changes in the lungs with apical and paraseptal bullous changes.  Patchy interstitial infiltrate posterior right upper lobe and posterior right lower lobe.  Some areas of nodularity in the dependent right lung base within this area of interstitial thickening with largest area of nodularity measuring 1.9 cm.    Numerous enlarged mediastinal lymph nodes are noted with a representative subcarinal lymph node measuring 1.4 x 1.3 cm.    Right hepatic lobe cyst.  Moderate narrowing proximal segment celiac artery.                             X-Ray Chest 1 View (Final result)  Result time 05/14/17 09:11:48    Final result by David Caal MD (05/14/17 09:11:48)                 Impression:      Diffuse chronic interstitial prominence with improving right upper lobe infiltrate with a small amount of residual right upper lobe interstitial markings remaining from prior chest x-ray.  Continued short-term followup  recommended.      Electronically signed by: DAVID ODOM MD  Date:     05/14/17  Time:    09:11              Narrative:    EXAM: XR CHEST 1 VIEW    CLINICAL HISTORY: Shortness of breath.    COMPARISON STUDIES: December 22, 2016    FINDINGS:  The cardiomediastinal silhouette is within normal limits.  Diffuse mild interstitial prominence throughout the lungs with minimal asymmetric markings in the right upper lobe, though improved from prior exam.  Surgical plate proximal left humerus.                                Assessment/Plan:      Diplopia    Neurology following.  MRI brain without contrast.        Physical deconditioning    PT evaluation.          Chronic systolic heart failure    Most likely present when she was admitted given her history and risk factors.  Echo showed ejection fraction of 30%.  Cardiology on consult with recommendations.  Optimize medical management.  Suspect ischemic heart disease.  Heparin infusion has been discontinued.  Patient started on ASA, statin and Clopidogrel.  NM stress test today.        LFT elevation    LFT's were normal on admission.  Check daily LFTs. Ammonia level was marginally elevated and responded to Lactulose. Liver Ultrasound showed a hepatic cyst and a mildly thickened gall bladder. Hepatitis panel is negative.         Delirium due to another medical condition, acute, hyperactive    Her episodes of confusion and agitation are unclear. She has a history of depression and misuse of medication and alcohol. CT head is negative for an acute intracranial process.  Neurology evaluated.  EEG unrevealing.  Ammonia level has normalized. Symptoms are improving slowly. Seroquel has been discontinued. Avoid sedating medications.        COPD exacerbation    Supplemental oxygen  Xopenex  IV Corticosteroids held.        Tobacco abuse    Must quit smoking and alcohol.  Wants Nicotine patch now.        Bipolar disorder    Holding all psychiatric meds.  She endorsed a history of misuse  of Alcohol and Latuda.   denies any formal diagnosis of Bipolar Disorder.        Anxiety    Holding all home psychiatric medications.  Careful use of Benzodiazepines with severe COPD.  PRN Ativan for agitation.  Seroquel has been discontinued.        * Acute on chronic respiratory failure with hypoxia    Supplemental oxygen to maintain sat > 92 % - wean as appropriate.  She has severe underlying COPD with most recent PFT in 2014 and has continued to smoke since then.  Pulmonary following.          VTE Risk Mitigation         Ordered     enoxaparin injection 40 mg  Daily     Route:  Subcutaneous        05/23/17 1153     Medium Risk of VTE  Once      05/14/17 1132     Place sequential compression device  Until discontinued      05/14/17 1132          Lewis Suarez MD  Department of Hospital Medicine   [facility]

## 2017-05-28 NOTE — DISCHARGE SUMMARY
[facility] Hospital Medicine  Discharge Summary      Patient Name: Jeniffer Fernandez  MRN: [id number]  Admission Date: 5/14/2017  Hospital Length of Stay: 13 days  Discharge Date and Time: 5/27/2017  1:15 PM  Attending Physician: No att. providers found   Discharging Provider: Lewis Suarez MD  Primary Care Provider: Ben Pelaez Jr, MD      HPI:   Jeniffer Fernandez is a 58 yo female + smoker with COPD and chronic respiratory failure who presented with worsening SOB over 1 week. For 5 days she had worn oxygen continuously. She describes frequent productive cough, wheezing, SOB, generalized weakness and FRANCES. Prior to presentation she had subjective fever and sweats. Pt has had several neb treatments and still having SOB. In the ED, ABG reflects acute on chronic hypoxic respiratory failure and O2 sat 87 %. CTA of Chest indicated a patchy RUL/RLL interstitial infiltrate.     * No surgery found *      Indwelling Lines/Drains at time of discharge:   Lines/Drains/Airways          No matching active lines, drains, or airways        Hospital Course:   Very agitated late morning 16 May and received Haldol and Ativan and slept most of the day.  She was better the following morning but became more confused and agitated in the evening and combative with staff.  Increased wheezing and pulling her oxygen off with desaturations.  When stable her blood gas showed adequate oxygenation and a pCO2 only slightly high at 50.  Transferred to ICU for respiratory failure. Was started on Precedex that has now been discontinued.  Continued to have intermittent periods of agitation. Negative head CT and Neurology consult placed and they diagnosed toxic encephalopathy related to sedating medications and Ceftriaxone related reversible encephalopathy.  Withdrew medications as appropriate including Ceftriaxone given her consistent improvement in respiratory status.  Evening agitation.  Started heparin drip and aspirin per  Cardiology recommendations.  Echo shows HF with reduced ejection fraction of 30% and slightly elevated troponin.  Symptoms of agitation have improved.  NM stress test revealed fixed left ventricular wall defect and reduced ejection fraction.  Optimal medical management indicated so we started low dose Lisinopril and Carvedilol given low blood pressures and Aspirin, Plavix, and Atorvastatin.  Patient with continued improved in mental status but complained of persistent double vision.  MRI brain revealed no acute stroke or evidence of hypoxic injury.  Some intermittent agitation in the evening.  Case management reviewed pursued rehab placement but she did not qualify.  Home health and Cardiology follow up arranged.  I have seen and examined Ms. Fernandez on the day of discharge and deemed her appropriate to return home.     Consults:   Consults         Status Ordering Provider     Inpatient consult to Cardiology  Once     Provider:  (Not yet assigned)    Completed ROSANNA CALVIN     Inpatient consult to Dietary  Once     Provider:  (Not yet assigned)    Completed ALANA PLATA     Inpatient consult to Neurology  Once     Provider:  Mary Grossman MD    Completed MONA GONZALEZ     Inpatient consult to Neurology  Once     Provider:  Juan Daniel Mary MD PhD    Completed MONA GONZALEZ     Inpatient consult to Pulmonology  Once     Provider:  Alana Plata MD    Completed PHYLLIS CARNEY     Inpatient consult to Social Work/Case Management  Once     Provider:  (Not yet assigned)    Completed MONA GONZALEZ     IP consult to dietary  Once     Provider:  (Not yet assigned)    Completed CHRISTO CRAWFORD          Significant Diagnostic Studies: Labs:   CMP   Recent Labs  Lab 05/26/17  0613 05/27/17  0604    141   K 4.4 4.1    103   CO2 27 30*    99   BUN 9 5*   CREATININE 0.7 0.7   CALCIUM 9.0 9.0   ALBUMIN 2.6* 2.7*   ANIONGAP 10 8   ESTGFRAFRICA >60 >60   EGFRNONAA >60 >60    and CBC No  results for input(s): WBC, HGB, HCT, PLT in the last 48 hours.    Pending Diagnostic Studies:     Procedure Component Value Units Date/Time    Ammonia [[id number]] Collected:  05/25/17 1654    Order Status:  Sent Lab Status:  In process Updated:  05/25/17 1655    Specimen:  Blood from Blood     Basic metabolic panel [[id number]] Collected:  05/25/17 1654    Order Status:  Sent Lab Status:  In process Updated:  05/25/17 1655    Specimen:  Blood from Blood     CBC auto differential [[id number]] Collected:  05/25/17 1654    Order Status:  Sent Lab Status:  In process Updated:  05/25/17 1655    Specimen:  Blood from Blood     Magnesium [[id number]] Collected:  05/25/17 1654    Order Status:  Sent Lab Status:  In process Updated:  05/25/17 1655    Specimen:  Blood from Blood     NM Myocardial Perfusion Spect Multi Pharmacologic [[id number]] Updated:  05/25/17 1355    Order Status:  Sent Lab Status:  In process         Final Active Diagnoses:    Diagnosis Date Noted POA    Diplopia [H53.2] 05/26/2017 No    Oropharyngeal dysphagia [R13.12] 05/24/2017 No    Physical deconditioning [R53.81] 05/23/2017 No    Chronic systolic heart failure [I50.22] 05/20/2017 Yes     Chronic    LFT elevation [R94.5] 05/18/2017 Yes    COPD exacerbation [J44.1] 05/14/2017 Yes    Bipolar disorder [F31.9] 12/22/2016 Yes    Anxiety [F41.9] 12/22/2016 Yes    Tobacco abuse [Z72.0] 12/22/2016 Yes     Chronic      Problems Resolved During this Admission:    Diagnosis Date Noted Date Resolved POA    PRINCIPAL PROBLEM:  Acute on chronic respiratory failure with hypoxia [J96.21] 05/14/2017 05/27/2017 Yes    Shock circulatory [R57.9] 05/21/2017 05/22/2017 No    Delirium due to another medical condition, acute, hyperactive [F05] 05/17/2017 05/27/2017 No    Pneumonia involving right lung [J18.9] 05/14/2017 05/21/2017 Yes      No new Assessment & Plan notes have been filed under this hospital service since the last note was generated.  Service:  Hospital Medicine      Discharged Condition: good    Dis[place] [facility]    Follow Up:  Follow-up Information     Tone Chadwick MD In 2 weeks.    Specialties:  Cardiology, Cardiovascular Disease  Why:  Hospital follow up for heart failure.  Contact information:  [id number]  [place] [address] [phone]             Ben Pelaez Jr, MD In 1 week.    Specialty:  Family Medicine  Why:  Hospital follow up.  Smoking cessation and Coronary artery disease.  Contact information:  [address]  [place] [address] [phone]                 Patient Instructions:     Diet Cardiac   Scheduling Instructions: Limit Sodium to 2 g per day       Medications:  Reconciled Home Medications:   Discharge Medication List as of 5/27/2017 11:35 AM      START taking these medications    Details   aspirin (ECOTRIN) 81 MG EC tablet Take 1 tablet (81 mg total) by mouth once daily., Starting Sat 5/27/2017, Until Sun 5/27/2018, OTC      atorvastatin (LIPITOR) 40 MG tablet Take 1 tablet (40 mg total) by mouth once daily., Starting Sat 5/27/2017, Until Wed 7/26/2017, Normal      carvedilol (COREG) 3.125 MG tablet Take 1 tablet (3.125 mg total) by mouth 2 (two) times daily., Starting Sat 5/27/2017, Until Wed 7/26/2017, Normal      clopidogrel (PLAVIX) 75 mg tablet Take 1 tablet (75 mg total) by mouth once daily., Starting Sat 5/27/2017, Until Wed 7/26/2017, Normal      lisinopril (PRINIVIL,ZESTRIL) 2.5 MG tablet Take 1 tablet (2.5 mg total) by mouth once daily., Starting Sat 5/27/2017, Until Wed 7/26/2017, Normal         CONTINUE these medications which have NOT CHANGED    Details   albuterol (PROVENTIL HFA) 90 mcg/actuation inhaler Inhale 2 puffs into the lungs every 6 (six) hours as needed for Wheezing. Rescue, Until Discontinued, Historical Med      lorazepam (ATIVAN) 0.5 MG tablet Take 0.5 mg by mouth every 6 (six) hours as needed for Anxiety., Until Discontinued, Historical Med      montelukast  (SINGULAIR) 10 mg tablet Take 10 mg by mouth every evening., Until Discontinued, Historical Med      paroxetine (PAXIL) 20 MG tablet Take 40 mg by mouth every morning. , Until Discontinued, Historical Med      umeclidinium (INCRUSE ELLIPTA) 62.5 mcg/actuation DsDv Inhale 62.5 mcg into the lungs once daily. Controller, Until Discontinued, Historical Med      b complex vitamins tablet Take 1 tablet by mouth once daily., Until Discontinued, Historical Med      docusate sodium (COLACE) 100 MG capsule Take 1 capsule (100 mg total) by mouth 2 (two) times daily., Starting 12/28/2016, Until Discontinued, Print      fluticasone (FLONASE) 50 mcg/actuation nasal spray 2 sprays by Each Nare route once daily., Starting 11/18/2014, Until Discontinued, Normal      mometasone-formoterol (DULERA) 200-5 mcg/actuation inhaler Inhale 2 puffs into the lungs 2 (two) times daily., Starting 12/11/2014, Until Fri 12/11/15, Normal      nicotine (NICODERM CQ) 21 mg/24 hr Place 1 patch onto the skin once daily., Starting 12/28/2016, Until Discontinued, OTC         STOP taking these medications       amitriptyline (ELAVIL) 50 MG tablet Comments:   Reason for Stopping:         predniSONE (DELTASONE) 10 MG tablet Comments:   Reason for Stopping:         ropinirole (REQUIP) 1 MG tablet Comments:   Reason for Stopping:             Time spent on the discharge of patient: 30 minutes    Lewis Suarez MD  Department of Hospital Medicine  [facility]

## 2017-06-05 ENCOUNTER — OFFICE VISIT (OUTPATIENT)
Dept: CARDIOLOGY | Facility: CLINIC | Age: 57
End: 2017-06-05
Payer: MEDICARE

## 2017-06-05 VITALS
SYSTOLIC BLOOD PRESSURE: 122 MMHG | BODY MASS INDEX: 17.6 KG/M2 | DIASTOLIC BLOOD PRESSURE: 62 MMHG | HEART RATE: 92 BPM | WEIGHT: 103.06 LBS | HEIGHT: 64 IN

## 2017-06-05 DIAGNOSIS — I25.10 CORONARY ARTERY DISEASE INVOLVING NATIVE CORONARY ARTERY OF NATIVE HEART WITHOUT ANGINA PECTORIS: ICD-10-CM

## 2017-06-05 DIAGNOSIS — J44.9 COPD, SEVERE: Chronic | ICD-10-CM

## 2017-06-05 DIAGNOSIS — I50.22 CHRONIC SYSTOLIC CONGESTIVE HEART FAILURE: Primary | ICD-10-CM

## 2017-06-05 DIAGNOSIS — I25.2 OLD MI (MYOCARDIAL INFARCTION): ICD-10-CM

## 2017-06-05 DIAGNOSIS — Z72.0 TOBACCO ABUSE: Chronic | ICD-10-CM

## 2017-06-05 PROCEDURE — 99999 PR PBB SHADOW E&M-EST. PATIENT-LVL III: CPT | Mod: PBBFAC,,, | Performed by: INTERNAL MEDICINE

## 2017-06-05 PROCEDURE — 99214 OFFICE O/P EST MOD 30 MIN: CPT | Mod: S$GLB,,, | Performed by: INTERNAL MEDICINE

## 2017-06-05 RX ORDER — FUROSEMIDE 20 MG/1
20 TABLET ORAL
Qty: 15 TABLET | Refills: 11 | Status: SHIPPED | OUTPATIENT
Start: 2017-06-05 | End: 2017-07-10

## 2017-06-05 NOTE — PROGRESS NOTES
Subjective:   Patient ID:  Jeniffer Fernandez is a 57 y.o. female who presents for follow up of Hospital Follow Up      58 yo female, came in for discharge f/u.  PMH CAD, recent NSTEMI, CHFrEF 25%, COPD, smoker, home o2, bipolar.  05/2017, she was admitted to [facility] for AMS, resp. Failure. She developed ACS/NSTEMI with TWI on ekg and elevated troponin. Invasive procedure was held due to AMS. CTA of Chest indicated a patchy RUL/RLL interstitial infiltrate. She stayed in ICU for COPD exacerbation and AMS. Before discharge, nuclear stress test showed anterior small to moderate sized infarct with damon ischemia. EF 25%.  Today, she states that she could remember that she was in the hospital for 10 days. Has chronic dyspnea. No chest pain., orthopnea, palpitation and leg swelling.   No smoking.          Past Medical History:   Diagnosis Date    Anxiety     COPD (chronic obstructive pulmonary disease)     Depression     Respiratory distress     SOB (shortness of breath)        Past Surgical History:   Procedure Laterality Date    COLONOSCOPY      TUBAL LIGATION         Social History   Substance Use Topics    Smoking status: Former Smoker     Packs/day: 0.50     Years: 35.00     Types: Cigarettes     Quit date: 5/1/2017    Smokeless tobacco: Not on file      Comment: decreasing amount of cigarettes    Alcohol use Yes      Comment: occassionally       Family History   Problem Relation Age of Onset    COPD Mother     Cancer Father      brain    Aneurysm Brother     Suicide Son     No Known Problems Son     No Known Problems Son          Review of Systems   Constitution: Negative for decreased appetite, diaphoresis, fever, weakness, malaise/fatigue and night sweats.   HENT: Negative for headaches and nosebleeds.    Eyes: Negative for blurred vision and double vision.   Cardiovascular: Negative for chest pain, claudication, dyspnea on exertion, irregular heartbeat, leg swelling, near-syncope, orthopnea,  palpitations, paroxysmal nocturnal dyspnea and syncope.   Respiratory: Positive for shortness of breath. Negative for cough, sleep disturbances due to breathing, snoring, sputum production and wheezing.    Endocrine: Negative for cold intolerance and polyuria.   Hematologic/Lymphatic: Does not bruise/bleed easily.   Skin: Negative for rash.   Musculoskeletal: Negative for back pain, falls, joint pain, joint swelling and neck pain.   Gastrointestinal: Negative for abdominal pain, heartburn, nausea and vomiting.   Genitourinary: Negative for dysuria, frequency and hematuria.   Neurological: Negative for difficulty with concentration, dizziness, focal weakness, light-headedness, numbness and seizures.   Psychiatric/Behavioral: Negative for depression, memory loss and substance abuse. The patient does not have insomnia.    Allergic/Immunologic: Negative for HIV exposure and hives.       Objective:   Physical Exam   Constitutional: She is oriented to person, place, and time. She appears well-nourished.   HENT:   Head: Normocephalic.   Eyes: Pupils are equal, round, and reactive to light.   Neck: Normal carotid pulses and no JVD present. Carotid bruit is not present. No thyromegaly present.   Cardiovascular: Normal rate, regular rhythm, normal heart sounds and normal pulses.   No extrasystoles are present. PMI is not displaced.  Exam reveals no gallop and no S3.    No murmur heard.  Pulmonary/Chest: Breath sounds normal. No stridor. No respiratory distress.   Decreased BS   Abdominal: Soft. Bowel sounds are normal. There is no tenderness. There is no rebound.   Musculoskeletal: Normal range of motion.   Neurological: She is alert and oriented to person, place, and time.   Skin: Skin is intact. No rash noted.   Psychiatric: Her behavior is normal.       No results found for: CHOL  No results found for: HDL  No results found for: LDLCALC  No results found for: TRIG  No results found for: CHOLHDL    Chemistry         Component Value Date/Time     05/27/2017 0604    K 4.1 05/27/2017 0604     05/27/2017 0604    CO2 30 (H) 05/27/2017 0604    BUN 5 (L) 05/27/2017 0604    CREATININE 0.7 05/27/2017 0604    GLU 99 05/27/2017 0604        Component Value Date/Time    CALCIUM 9.0 05/27/2017 0604    ALKPHOS 41 (L) 05/24/2017 0845    AST 19 05/24/2017 0845    ALT 34 05/24/2017 0845    BILITOT 0.3 05/24/2017 0845          No results found for: TSH  Lab Results   Component Value Date    INR 1.0 05/20/2017    INR 1.0 05/14/2017     Lab Results   Component Value Date    WBC 9.87 05/25/2017    HGB 12.2 05/25/2017    HCT 38.1 05/25/2017     (H) 05/25/2017     05/25/2017     BMP  Sodium   Date Value Ref Range Status   05/27/2017 141 136 - 145 mmol/L Final     Potassium   Date Value Ref Range Status   05/27/2017 4.1 3.5 - 5.1 mmol/L Final     Chloride   Date Value Ref Range Status   05/27/2017 103 95 - 110 mmol/L Final     CO2   Date Value Ref Range Status   05/27/2017 30 (H) 23 - 29 mmol/L Final     BUN, Bld   Date Value Ref Range Status   05/27/2017 5 (L) 6 - 20 mg/dL Final     Creatinine   Date Value Ref Range Status   05/27/2017 0.7 0.5 - 1.4 mg/dL Final     Calcium   Date Value Ref Range Status   05/27/2017 9.0 8.7 - 10.5 mg/dL Final     Anion Gap   Date Value Ref Range Status   05/27/2017 8 8 - 16 mmol/L Final     eGFR if    Date Value Ref Range Status   05/27/2017 >60 >60 mL/min/1.73 m^2 Final     eGFR if non    Date Value Ref Range Status   05/27/2017 >60 >60 mL/min/1.73 m^2 Final     Comment:     Calculation used to obtain the estimated glomerular filtration  rate (eGFR) is the CKD-EPI equation. Since race is unknown   in our information system, the eGFR values for   -American and Non--American patients are given   for each creatinine result.       Estimated Creatinine Clearance: 64.7 mL/min (based on Cr of 0.7).     Assessment:      1. Chronic systolic congestive  heart failure    2. Coronary artery disease involving native coronary artery of native heart without angina pectoris    3. Old MI (myocardial infarction)    4. COPD, severe    5. Tobacco abuse      CP free  CHFrEF NYHA class IIII, compensated  Plan:   Continue ASA, Plavix, Coreg, ACEI, and statin  Add Lasix 20 mg q 48 hours  DASH and fluid restriction  RTC in 4 weeks.

## 2017-06-06 PROBLEM — I25.2 OLD MI (MYOCARDIAL INFARCTION): Status: ACTIVE | Noted: 2017-06-06

## 2017-06-06 PROBLEM — I25.10 CORONARY ARTERY DISEASE INVOLVING NATIVE CORONARY ARTERY OF NATIVE HEART WITHOUT ANGINA PECTORIS: Status: ACTIVE | Noted: 2017-06-06

## 2017-06-13 ENCOUNTER — TELEPHONE (OUTPATIENT)
Dept: PULMONOLOGY | Facility: CLINIC | Age: 57
End: 2017-06-13

## 2017-06-13 NOTE — TELEPHONE ENCOUNTER
----- Message from Debbie Cox sent at 6/13/2017 10:25 AM CDT -----  Contact: Self  Pt is calling because she was seen while hospitalized and was to schedule an appt upon discharge.  Pt is scheduled to see Dr. Broussard on 6/23/17, but says she feels she is getting sick again and is worried about her lungs and requests Staff contact her for a sooner appt.    She can be reached at [phone].    Thank you.

## 2017-06-13 NOTE — TELEPHONE ENCOUNTER
----- Message from Debbie Cox sent at 6/13/2017 10:25 AM CDT -----  Contact: Self  Pt is calling because she was seen while hospitalized and was to schedule an appt upon discharge.  Pt is scheduled to see Dr. Broussard on 6/23/17, but says she feels she is getting sick again and is worried about her lungs and requests Staff contact her for a sooner appt.    She can be reached at [phone].    Thank you.

## 2017-06-21 ENCOUNTER — TELEPHONE (OUTPATIENT)
Dept: CARDIOLOGY | Facility: CLINIC | Age: 57
End: 2017-06-21

## 2017-06-21 ENCOUNTER — OFFICE VISIT (OUTPATIENT)
Dept: NEUROLOGY | Facility: CLINIC | Age: 57
End: 2017-06-21
Payer: MEDICARE

## 2017-06-21 VITALS
BODY MASS INDEX: 18.11 KG/M2 | DIASTOLIC BLOOD PRESSURE: 60 MMHG | HEIGHT: 64 IN | WEIGHT: 106.06 LBS | SYSTOLIC BLOOD PRESSURE: 82 MMHG | HEART RATE: 72 BPM

## 2017-06-21 DIAGNOSIS — J44.1 COPD EXACERBATION: ICD-10-CM

## 2017-06-21 DIAGNOSIS — I50.22 CHRONIC SYSTOLIC CONGESTIVE HEART FAILURE: Primary | ICD-10-CM

## 2017-06-21 PROCEDURE — 99999 PR PBB SHADOW E&M-EST. PATIENT-LVL III: CPT | Mod: PBBFAC,,, | Performed by: PSYCHIATRY & NEUROLOGY

## 2017-06-21 PROCEDURE — 99215 OFFICE O/P EST HI 40 MIN: CPT | Mod: S$GLB,,, | Performed by: PSYCHIATRY & NEUROLOGY

## 2017-06-21 NOTE — PROGRESS NOTES
Progress note  Neurology      Neurology follow up:  For: hospital follow up    SUBJECTIVE:      HPI:   HISTORY OF PRESENT ILLNESS:  This is a 57-year-old lady with end stage pulmonary   disease on 24/7 oxygen, COPD and also cardiovascular problems, presented today   in the followup visit after they discharge from the hospital.  She was   hospitalized lately with encephalopathy.  It was thought that her encephalopathy   was due to OR medications, also acute exacerbation of COPD.  With medical   treatment, she got better and in the hospital Neurology got involved because of   her mental status change.  In the hospital course, she did show some diplopia.    MRI of the brain has been done.  She did not show any stroke.  She is here today   with her , has no complaints except sleep problem.  She said that middle   of the night she wakes up and cannot sleep.  She used to take multiple   medications like clonazepam, amitriptyline for different reasons including   insomnia.  That has been discontinued from the hospital because of this   encephalopathy.  She has no new complaint, but she has asked for some medication   for sleep.        Past Medical History:   Diagnosis Date    Anxiety     COPD (chronic obstructive pulmonary disease)     Depression     Respiratory distress     SOB (shortness of breath)      Past Surgical History:   Procedure Laterality Date    COLONOSCOPY      TUBAL LIGATION       Family History   Problem Relation Age of Onset    COPD Mother     Cancer Father      brain    Aneurysm Brother     Suicide Son     No Known Problems Son     No Known Problems Son      Social History   Substance Use Topics    Smoking status: Former Smoker     Packs/day: 0.50     Years: 35.00     Types: Cigarettes     Quit date: 5/1/2017    Smokeless tobacco: Not on file      Comment: decreasing amount of cigarettes    Alcohol use Yes      Comment: occassionally       Review of patient's allergies indicates:    Allergen Reactions    Codeine Itching    Codeine phosphate      Itchy (skin)^      Patient Active Problem List   Diagnosis    Closed fracture of proximal end of left humerus    Fracture of proximal humerus    Pneumonia    Depression    Anxiety    Bipolar disorder    COPD, severe    Tobacco abuse    Abdominal pain    Lung infiltrate    COPD exacerbation    LFT elevation    Chronic systolic heart failure    Physical deconditioning    Oropharyngeal dysphagia    Diplopia    Chronic systolic congestive heart failure    Coronary artery disease involving native coronary artery of native heart without angina pectoris    Old MI (myocardial infarction)     Review of Systems   Constitutional: Negative for fever and weight loss.   HENT: Negative for hearing loss.    Eyes: Negative for blurred vision, double vision, photophobia and pain.   Respiratory: Positive for shortness of breath and wheezing. Negative for cough.    Cardiovascular: Negative for chest pain.   Gastrointestinal: Negative for abdominal pain, nausea and vomiting.   Genitourinary: Negative for dysuria, frequency and urgency.   Musculoskeletal: Negative.  Negative for back pain, falls, joint pain, myalgias and neck pain.   Skin: Negative for itching and rash.   Neurological: Negative for tingling and headaches.   Psychiatric/Behavioral: Negative for depression and memory loss. The patient is nervous/anxious and has insomnia.            OBJECTIVE:     Vital Signs (Most Recent)  Pulse: 72 (06/21/17 1124)  BP: (!) 82/60 (06/21/17 1124)    Physical Exam   Constitutional: She is oriented to person, place, and time. She appears well-developed and well-nourished.   HENT:   Head: Normocephalic and atraumatic.   Eyes: Conjunctivae and EOM are normal. Pupils are equal, round, and reactive to light.   Neck: Normal range of motion. Neck supple. No JVD present. No tracheal deviation present. No thyromegaly present.   Cardiovascular: Normal rate, regular  rhythm and normal heart sounds.    Pulmonary/Chest: Effort normal and breath sounds normal.   Abdominal: She exhibits no distension. There is no tenderness.   Musculoskeletal: Normal range of motion. She exhibits no edema or tenderness.   Neurological: She is alert and oriented to person, place, and time. She has normal strength and normal reflexes. She displays normal reflexes. No cranial nerve deficit or sensory deficit. She exhibits normal muscle tone. She displays a negative Romberg sign. Coordination and gait normal.   Reflex Scores:       Tricep reflexes are 2+ on the right side and 2+ on the left side.       Bicep reflexes are 2+ on the right side and 2+ on the left side.       Brachioradialis reflexes are 2+ on the right side and 2+ on the left side.       Patellar reflexes are 2+ on the right side and 2+ on the left side.       Achilles reflexes are 2+ on the right side and 2+ on the left side.  Skin: Skin is warm and dry. No rash noted.   Psychiatric: She has a normal mood and affect. Her behavior is normal. Judgment and thought content normal.       Strength  Deltoids Triceps Biceps Wrist Extension Wrist Flexion Hand    Upper: R 5/5 5/5 5/5 5/5 5/5 5/5    L 5/5 5/5 5/5 5/5 5/5 5/5     Iliopsoas Quadriceps Knee  Flexion Tibialis  anterior Gastro- cnemius EHL   Lower: R 5/5 5/5 5/5 5/5 5/5 5/5    L 5/5 5/5 5/5 5/5 5/5 5/5         Laboratory:  Lab Results   Component Value Date    WBC 9.87 05/25/2017    HGB 12.2 05/25/2017    HCT 38.1 05/25/2017     05/25/2017    ALT 34 05/24/2017    AST 19 05/24/2017     05/27/2017    K 4.1 05/27/2017     05/27/2017    CREATININE 0.7 05/27/2017    BUN 5 (L) 05/27/2017    CO2 30 (H) 05/27/2017    INR 1.0 05/20/2017           Diagnostic Results:  MRI of the brain:5/26/2017  Impression: Normal.      ASSESSMENT/PLAN:     Assessment:   1. Metabolic encephalopathy improved and now she is stable on O2 24/7. She ha stoped smoking and other sedatives.   2.  Insomnia: possibly from hypoxemia.     Patient Active Problem List   Diagnosis    Closed fracture of proximal end of left humerus    Fracture of proximal humerus    Pneumonia    Depression    Anxiety    Bipolar disorder    COPD, severe    Tobacco abuse    Abdominal pain    Lung infiltrate    COPD exacerbation    LFT elevation    Chronic systolic heart failure    Physical deconditioning    Oropharyngeal dysphagia    Diplopia    Chronic systolic congestive heart failure    Coronary artery disease involving native coronary artery of native heart without angina pectoris    Old MI (myocardial infarction)         Plan:  No further visit necessary for the this reason . Advised to talk to Dr. Broussard about insomnia.

## 2017-06-21 NOTE — TELEPHONE ENCOUNTER
----- Message from Gayatri Galdamez sent at 6/21/2017 10:39 AM CDT -----  Patient states she think she may be having issues with her medication. States she is getting bruises all over her body. States it is like a baseball size and they hurt. Patient states she was advised by her pcp to inform her Cardiologist. Patient states to please contact her after lunch because she is on her way to an appt. Please adv/call [phone].//thanks. cw

## 2017-06-21 NOTE — TELEPHONE ENCOUNTER
----- Message from Arianna Frankel sent at 6/21/2017  3:14 PM CDT -----  Contact: self [phone]  States that she is returning call please call back at [phone]//thank you acc

## 2017-06-21 NOTE — TELEPHONE ENCOUNTER
----- Message from Stephanie Galdamez sent at 6/21/2017  2:13 PM CDT -----  Contact: pt  The pt states she is returning a missed call, pt can be reached at [phone]///thxMW

## 2017-06-23 ENCOUNTER — PROCEDURE VISIT (OUTPATIENT)
Dept: PULMONOLOGY | Facility: CLINIC | Age: 57
End: 2017-06-23
Payer: MEDICARE

## 2017-06-23 ENCOUNTER — HOSPITAL ENCOUNTER (OUTPATIENT)
Dept: RADIOLOGY | Facility: HOSPITAL | Age: 57
Discharge: HOME | End: 2017-06-23
Attending: INTERNAL MEDICINE
Payer: MEDICARE

## 2017-06-23 ENCOUNTER — OFFICE VISIT (OUTPATIENT)
Dept: PULMONOLOGY | Facility: CLINIC | Age: 57
End: 2017-06-23
Payer: MEDICARE

## 2017-06-23 VITALS
DIASTOLIC BLOOD PRESSURE: 76 MMHG | WEIGHT: 104.94 LBS | HEIGHT: 64 IN | SYSTOLIC BLOOD PRESSURE: 120 MMHG | OXYGEN SATURATION: 99 % | RESPIRATION RATE: 18 BRPM | HEART RATE: 82 BPM | BODY MASS INDEX: 17.92 KG/M2

## 2017-06-23 DIAGNOSIS — J44.9 COPD, SEVERE: Primary | Chronic | ICD-10-CM

## 2017-06-23 DIAGNOSIS — J44.9 COPD, SEVERE: Chronic | ICD-10-CM

## 2017-06-23 DIAGNOSIS — J44.89 NOCTURNAL HYPOXEMIA DUE TO OBSTRUCTIVE CHRONIC BRONCHITIS: Chronic | ICD-10-CM

## 2017-06-23 DIAGNOSIS — R64 PULMONARY CACHEXIA DUE TO CHRONIC OBSTRUCTIVE PULMONARY DISEASE: ICD-10-CM

## 2017-06-23 DIAGNOSIS — Z72.0 TOBACCO ABUSE: Chronic | ICD-10-CM

## 2017-06-23 DIAGNOSIS — J44.9 PULMONARY CACHEXIA DUE TO CHRONIC OBSTRUCTIVE PULMONARY DISEASE: ICD-10-CM

## 2017-06-23 DIAGNOSIS — G47.36 NOCTURNAL HYPOXEMIA DUE TO OBSTRUCTIVE CHRONIC BRONCHITIS: Chronic | ICD-10-CM

## 2017-06-23 PROBLEM — R13.12 OROPHARYNGEAL DYSPHAGIA: Status: RESOLVED | Noted: 2017-05-24 | Resolved: 2017-06-23

## 2017-06-23 PROBLEM — J44.1 COPD EXACERBATION: Status: RESOLVED | Noted: 2017-05-14 | Resolved: 2017-06-23

## 2017-06-23 LAB
PRE FEF 25 75: 0.24 L/S (ref 1.82–3.07)
PRE FET 100: 20.86 S
PRE FEV1 FVC: 34 %
PRE FEV1: 1.06 L (ref 2.33–2.92)
PRE FIF 50: 1.82 L/S
PRE FVC: 3.13 L (ref 3.03–3.73)
PRE PEF: 2.17 L/S (ref 5.57–7.29)
PREDICTED FEV1 FVC: 78.48 % (ref 73.59–83.38)
PREDICTED FEV1: 2.63 L (ref 2.33–2.92)
PREDICTED FVC: 3.38 L (ref 3.03–3.73)
PROVOCATION PROTOCOL: ABNORMAL

## 2017-06-23 PROCEDURE — 94010 BREATHING CAPACITY TEST: CPT | Mod: S$GLB,,, | Performed by: INTERNAL MEDICINE

## 2017-06-23 PROCEDURE — 71020 XR CHEST PA AND LATERAL: CPT | Mod: 26,,, | Performed by: RADIOLOGY

## 2017-06-23 PROCEDURE — 99999 PR PBB SHADOW E&M-EST. PATIENT-LVL IV: CPT | Mod: PBBFAC,,, | Performed by: INTERNAL MEDICINE

## 2017-06-23 PROCEDURE — 99499 UNLISTED E&M SERVICE: CPT | Mod: S$GLB,,, | Performed by: INTERNAL MEDICINE

## 2017-06-23 PROCEDURE — 99214 OFFICE O/P EST MOD 30 MIN: CPT | Mod: 25,S$GLB,, | Performed by: INTERNAL MEDICINE

## 2017-06-23 PROCEDURE — 71020 XR CHEST PA AND LATERAL: CPT | Mod: TC,PO

## 2017-06-23 NOTE — PROGRESS NOTES
"Subjective:       Patient ID: Jeniffer Fernandez is a 57 y.o. female.    Chief Complaint: Hospital Follow Up and COPD    Miss Jeniffer Fernandez is 57 years old  This a follow-up after ICU protracted stay  She has severe COPD with an FEV1 of 1.25 (46% predicted)  COPD score is 23.  MRC score is 2  She occasionally gets palpitations whenever she exerts herself  She is on 2 L of oxygen  She has [facility] coming to see her  No cough no wheezing but some symptomatic dyspnea with activity  Medications Incruse Ellipta Symbicort and nebulizer  She hardly ever uses the nebulizer now  She quit smoking since her ICU hospitalization  She will need a chest x-ray today  Since I last saw her she has also quit alcohol  Immunizations are up-to-date  I have reviewed the patient's medical history in detail and updated the computerized patient record.        Review of Systems   Constitutional: Negative.    HENT: Negative.    Eyes: Negative.    Respiratory: Positive for dyspnea on extertion. Negative for snoring, sputum production, shortness of breath and wheezing.    Cardiovascular: Negative.    Genitourinary: Negative.    Endocrine: endocrine negative   Musculoskeletal: Negative.    Skin: Negative.    Gastrointestinal: Negative.    Neurological: Negative.    Psychiatric/Behavioral: Negative.        Objective:       Vitals:    06/23/17 1043   BP: 120/76   BP Location: Right arm   Patient Position: Sitting   BP Method: Manual   Pulse: 82   Resp: 18   SpO2: 99%  Comment: 2.0 LPM Oxygen   Weight: 47.6 kg (104 lb 15 oz)   Height: 5' 4" (1.626 m)     Physical Exam   Constitutional: She is oriented to person, place, and time. Vital signs are normal. She appears well-developed. She appears cachectic. She has a sickly appearance.   HENT:   Head: Normocephalic.   Nose: Nose normal.   Mouth/Throat: Oropharynx is clear and moist. No oropharyngeal exudate.   Neck: Normal range of motion. Neck supple. No JVD present. No tracheal deviation present. "   Cardiovascular: Normal rate and normal heart sounds.    No murmur heard.  Pulmonary/Chest: Normal expansion and hyperinflation. She has decreased breath sounds. She has no wheezes. She has no rhonchi.   Abdominal: Soft. Bowel sounds are normal.   Musculoskeletal: Normal range of motion.   Lymphadenopathy:     She has no cervical adenopathy.   Neurological: She is alert and oriented to person, place, and time. Gait normal.   Skin: Skin is warm and dry. No rash noted.   Psychiatric: She has a normal mood and affect. Her behavior is normal.   Nursing note and vitals reviewed.    Personal Diagnostic Review  Chest x-ray:   Clear , hyperinflation    Pulmonary function tests: FEV1: 1.06  (40 % predicted), FVC:  3.13 (93 % predicted), FEV1/FVC:  34  No flowsheet data found.      Moderate Severe Obstructive Defect /Physiology  FEV1 is increased by >12% and >200mls, indicating a response to inhaled bronchodilators.  FVC is increased by >12% and >200mls indicating a response to inhaled bronchodilators  Increased Volume Suggestive of Hyperinflation  Diffusion Moderately Reduced  Assessment:       Problem List Items Addressed This Visit     RESOLVED: Tobacco abuse (Chronic)    COPD, severe - Primary (Chronic)     COPD ROS: taking medications as instructed, no medication side effects noted, no significant ongoing wheezing or shortness of breath, using bronchodilator MDI less than twice a week.   CAT score 23  Med: INCRUSE and Symbicort HFA  immunizations current  Has nebulizer  New concerns: None.     Exam: appears well, vitals normal, no respiratory distress, acyanotic, normal RR, chest clear, no wheezing, crepitations, rhonchi, normal symmetric air entry.     Assessment: COPD reasonably well controlled.     Plan: current treatment plan is effective, no change in therapy.              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       Plan:         Return in about 3 months (around 9/23/2017) for CXR and roxana  today, Spirometry and cxr.    This note was prepared using voice recognition system and is likely to have sound alike errors that may have been overlooked even after proof reading.  Please call me with any questions    Discussed diagnosis, its evaluation, treatment and usual course. All questions answered.    Thank you for the courtesy of participating in the care of this patient    Angel Broussard MD

## 2017-06-23 NOTE — ASSESSMENT & PLAN NOTE
COPD ROS: taking medications as instructed, no medication side effects noted, no significant ongoing wheezing or shortness of breath, using bronchodilator MDI less than twice a week.   CAT score 23  Med: INCRUSE and Symbicort HFA  immunizations current  Has nebulizer  New concerns: None.     Exam: appears well, vitals normal, no respiratory distress, acyanotic, normal RR, chest clear, no wheezing, crepitations, rhonchi, normal symmetric air entry.     Assessment: COPD reasonably well controlled.     Plan: current treatment plan is effective, no change in therapy.

## 2017-07-03 ENCOUNTER — TELEPHONE (OUTPATIENT)
Dept: PULMONOLOGY | Facility: HOSPITAL | Age: 57
End: 2017-07-03

## 2017-07-05 ENCOUNTER — TELEPHONE (OUTPATIENT)
Dept: PULMONOLOGY | Facility: CLINIC | Age: 57
End: 2017-07-05

## 2017-07-05 ENCOUNTER — TELEPHONE (OUTPATIENT)
Dept: CARDIOLOGY | Facility: CLINIC | Age: 57
End: 2017-07-05

## 2017-07-05 NOTE — TELEPHONE ENCOUNTER
Can increase Lasix from 20 mg q 48 hours to daily. And please call back for weight change in 3 days.

## 2017-07-05 NOTE — TELEPHONE ENCOUNTER
Pt having increased sob  Wt 105.2kg  Using nebulizer and inhalers more often  Began after d/c plavix  HH nurse requesting orders to increase lasix  Please advise

## 2017-07-05 NOTE — TELEPHONE ENCOUNTER
----- Message from Kimmie Chavez sent at 7/5/2017  9:54 AM CDT -----  Contact: Rozina Snyder [facility]  1. What is the name of the medication you are requesting? Ativan  2. What is the dose? 0.5 mgs  3. How do you take the medication? Orally, topically, etc? orally  4. How often do you take this medication? Twice a day  5. Do you need a 30 day or 90 day supply? 30 day  6. How many refills are you requesting? Left up to the provider  7. What is your preferred pharmacy and location of the pharmacy? [facility] [phone]  8. Who can we contact with further questions? Rozina Snyder at [phone].                                    [facility]

## 2017-07-06 ENCOUNTER — HOSPITAL ENCOUNTER (OUTPATIENT)
Facility: HOSPITAL | Age: 57
Discharge: HOME | End: 2017-07-07
Attending: EMERGENCY MEDICINE
Payer: MEDICARE

## 2017-07-06 DIAGNOSIS — R07.9 CHEST PAIN, UNSPECIFIED TYPE: Primary | ICD-10-CM

## 2017-07-06 DIAGNOSIS — J44.9 CHRONIC OBSTRUCTIVE PULMONARY DISEASE, UNSPECIFIED COPD TYPE: ICD-10-CM

## 2017-07-06 PROBLEM — I95.1 ORTHOSTASIS: Status: ACTIVE | Noted: 2017-07-06

## 2017-07-06 LAB
ALBUMIN SERPL BCP-MCNC: 3.9 G/DL
ALP SERPL-CCNC: 57 U/L
ALT SERPL W/O P-5'-P-CCNC: 31 U/L
ANION GAP SERPL CALC-SCNC: 10 MMOL/L
AST SERPL-CCNC: 25 U/L
BASOPHILS # BLD AUTO: 0.03 K/UL
BASOPHILS NFR BLD: 0.5 %
BILIRUB SERPL-MCNC: 0.6 MG/DL
BNP SERPL-MCNC: 16 PG/ML
BUN SERPL-MCNC: 10 MG/DL
CALCIUM SERPL-MCNC: 9.4 MG/DL
CHLORIDE SERPL-SCNC: 104 MMOL/L
CO2 SERPL-SCNC: 29 MMOL/L
CREAT SERPL-MCNC: 0.7 MG/DL
DIFFERENTIAL METHOD: ABNORMAL
EOSINOPHIL # BLD AUTO: 0.4 K/UL
EOSINOPHIL NFR BLD: 5.7 %
ERYTHROCYTE [DISTWIDTH] IN BLOOD BY AUTOMATED COUNT: 14.1 %
EST. GFR  (AFRICAN AMERICAN): >60 ML/MIN/1.73 M^2
EST. GFR  (NON AFRICAN AMERICAN): >60 ML/MIN/1.73 M^2
GLUCOSE SERPL-MCNC: 123 MG/DL
HCT VFR BLD AUTO: 40.8 %
HGB BLD-MCNC: 13.8 G/DL
LYMPHOCYTES # BLD AUTO: 2.1 K/UL
LYMPHOCYTES NFR BLD: 32.7 %
MCH RBC QN AUTO: 32.5 PG
MCHC RBC AUTO-ENTMCNC: 33.8 %
MCV RBC AUTO: 96 FL
MONOCYTES # BLD AUTO: 0.8 K/UL
MONOCYTES NFR BLD: 12.5 %
NEUTROPHILS # BLD AUTO: 3.1 K/UL
NEUTROPHILS NFR BLD: 48.6 %
PLATELET # BLD AUTO: 155 K/UL
PMV BLD AUTO: 11.1 FL
POTASSIUM SERPL-SCNC: 4.1 MMOL/L
PROT SERPL-MCNC: 6.9 G/DL
RBC # BLD AUTO: 4.25 M/UL
SODIUM SERPL-SCNC: 143 MMOL/L
TROPONIN I SERPL DL<=0.01 NG/ML-MCNC: 0.01 NG/ML
TROPONIN I SERPL DL<=0.01 NG/ML-MCNC: 0.02 NG/ML
WBC # BLD AUTO: 6.3 K/UL

## 2017-07-06 PROCEDURE — 83880 ASSAY OF NATRIURETIC PEPTIDE: CPT

## 2017-07-06 PROCEDURE — 96361 HYDRATE IV INFUSION ADD-ON: CPT

## 2017-07-06 PROCEDURE — 85025 COMPLETE CBC W/AUTO DIFF WBC: CPT

## 2017-07-06 PROCEDURE — G0378 HOSPITAL OBSERVATION PER HR: HCPCS

## 2017-07-06 PROCEDURE — 96372 THER/PROPH/DIAG INJ SC/IM: CPT

## 2017-07-06 PROCEDURE — 63600175 PHARM REV CODE 636 W HCPCS: Performed by: EMERGENCY MEDICINE

## 2017-07-06 PROCEDURE — 94640 AIRWAY INHALATION TREATMENT: CPT

## 2017-07-06 PROCEDURE — 63600175 PHARM REV CODE 636 W HCPCS: Performed by: NURSE PRACTITIONER

## 2017-07-06 PROCEDURE — 25000242 PHARM REV CODE 250 ALT 637 W/ HCPCS: Performed by: EMERGENCY MEDICINE

## 2017-07-06 PROCEDURE — 84484 ASSAY OF TROPONIN QUANT: CPT

## 2017-07-06 PROCEDURE — 25000242 PHARM REV CODE 250 ALT 637 W/ HCPCS: Performed by: NURSE PRACTITIONER

## 2017-07-06 PROCEDURE — 94761 N-INVAS EAR/PLS OXIMETRY MLT: CPT

## 2017-07-06 PROCEDURE — 80053 COMPREHEN METABOLIC PANEL: CPT

## 2017-07-06 PROCEDURE — 25000003 PHARM REV CODE 250: Performed by: NURSE PRACTITIONER

## 2017-07-06 PROCEDURE — 84484 ASSAY OF TROPONIN QUANT: CPT | Mod: 91

## 2017-07-06 PROCEDURE — 96374 THER/PROPH/DIAG INJ IV PUSH: CPT

## 2017-07-06 PROCEDURE — 93010 ELECTROCARDIOGRAM REPORT: CPT | Mod: ,,, | Performed by: INTERNAL MEDICINE

## 2017-07-06 PROCEDURE — 99285 EMERGENCY DEPT VISIT HI MDM: CPT | Mod: 25

## 2017-07-06 PROCEDURE — 25000003 PHARM REV CODE 250: Performed by: EMERGENCY MEDICINE

## 2017-07-06 PROCEDURE — 27000221 HC OXYGEN, UP TO 24 HOURS

## 2017-07-06 RX ORDER — SODIUM CHLORIDE 9 MG/ML
INJECTION, SOLUTION INTRAVENOUS CONTINUOUS
Status: DISCONTINUED | OUTPATIENT
Start: 2017-07-06 | End: 2017-07-06

## 2017-07-06 RX ORDER — NAPROXEN SODIUM 220 MG/1
81 TABLET, FILM COATED ORAL DAILY
Status: DISCONTINUED | OUTPATIENT
Start: 2017-07-07 | End: 2017-07-06

## 2017-07-06 RX ORDER — DOCUSATE SODIUM 100 MG/1
100 CAPSULE, LIQUID FILLED ORAL 2 TIMES DAILY
Status: DISCONTINUED | OUTPATIENT
Start: 2017-07-06 | End: 2017-07-07 | Stop reason: HOSPADM

## 2017-07-06 RX ORDER — ENOXAPARIN SODIUM 100 MG/ML
40 INJECTION SUBCUTANEOUS EVERY 24 HOURS
Status: DISCONTINUED | OUTPATIENT
Start: 2017-07-06 | End: 2017-07-07 | Stop reason: HOSPADM

## 2017-07-06 RX ORDER — ATORVASTATIN CALCIUM 40 MG/1
40 TABLET, FILM COATED ORAL DAILY
Status: DISCONTINUED | OUTPATIENT
Start: 2017-07-07 | End: 2017-07-07 | Stop reason: HOSPADM

## 2017-07-06 RX ORDER — ASPIRIN 325 MG
325 TABLET ORAL
Status: COMPLETED | OUTPATIENT
Start: 2017-07-06 | End: 2017-07-06

## 2017-07-06 RX ORDER — ASPIRIN 81 MG/1
81 TABLET ORAL DAILY
Status: DISCONTINUED | OUTPATIENT
Start: 2017-07-07 | End: 2017-07-07 | Stop reason: HOSPADM

## 2017-07-06 RX ORDER — PANTOPRAZOLE SODIUM 40 MG/1
40 TABLET, DELAYED RELEASE ORAL DAILY
Status: DISCONTINUED | OUTPATIENT
Start: 2017-07-07 | End: 2017-07-07 | Stop reason: HOSPADM

## 2017-07-06 RX ORDER — IPRATROPIUM BROMIDE AND ALBUTEROL SULFATE 2.5; .5 MG/3ML; MG/3ML
3 SOLUTION RESPIRATORY (INHALATION)
Status: COMPLETED | OUTPATIENT
Start: 2017-07-06 | End: 2017-07-06

## 2017-07-06 RX ORDER — LISINOPRIL 2.5 MG/1
2.5 TABLET ORAL DAILY
Status: DISCONTINUED | OUTPATIENT
Start: 2017-07-07 | End: 2017-07-06

## 2017-07-06 RX ORDER — IPRATROPIUM BROMIDE AND ALBUTEROL SULFATE 2.5; .5 MG/3ML; MG/3ML
3 SOLUTION RESPIRATORY (INHALATION) EVERY 6 HOURS
Status: COMPLETED | OUTPATIENT
Start: 2017-07-06 | End: 2017-07-07

## 2017-07-06 RX ORDER — BUDESONIDE 0.5 MG/2ML
0.5 INHALANT ORAL EVERY 12 HOURS
Status: DISCONTINUED | OUTPATIENT
Start: 2017-07-06 | End: 2017-07-07 | Stop reason: HOSPADM

## 2017-07-06 RX ORDER — ARFORMOTEROL TARTRATE 15 UG/2ML
15 SOLUTION RESPIRATORY (INHALATION) EVERY 12 HOURS
Status: DISCONTINUED | OUTPATIENT
Start: 2017-07-06 | End: 2017-07-07 | Stop reason: HOSPADM

## 2017-07-06 RX ORDER — CARVEDILOL 3.12 MG/1
3.12 TABLET ORAL 2 TIMES DAILY WITH MEALS
Status: DISCONTINUED | OUTPATIENT
Start: 2017-07-06 | End: 2017-07-07 | Stop reason: HOSPADM

## 2017-07-06 RX ORDER — FUROSEMIDE 20 MG/1
20 TABLET ORAL
Status: DISCONTINUED | OUTPATIENT
Start: 2017-07-06 | End: 2017-07-06

## 2017-07-06 RX ORDER — MONTELUKAST SODIUM 10 MG/1
10 TABLET ORAL NIGHTLY
Status: DISCONTINUED | OUTPATIENT
Start: 2017-07-06 | End: 2017-07-07 | Stop reason: HOSPADM

## 2017-07-06 RX ADMIN — BUDESONIDE 0.5 MG: 0.5 SUSPENSION RESPIRATORY (INHALATION) at 07:07

## 2017-07-06 RX ADMIN — IPRATROPIUM BROMIDE AND ALBUTEROL SULFATE 3 ML: .5; 3 SOLUTION RESPIRATORY (INHALATION) at 11:07

## 2017-07-06 RX ADMIN — METHYLPREDNISOLONE SODIUM SUCCINATE 125 MG: 125 INJECTION, POWDER, FOR SOLUTION INTRAMUSCULAR; INTRAVENOUS at 12:07

## 2017-07-06 RX ADMIN — IPRATROPIUM BROMIDE AND ALBUTEROL SULFATE 3 ML: .5; 3 SOLUTION RESPIRATORY (INHALATION) at 07:07

## 2017-07-06 RX ADMIN — IPRATROPIUM BROMIDE AND ALBUTEROL SULFATE 3 ML: .5; 3 SOLUTION RESPIRATORY (INHALATION) at 12:07

## 2017-07-06 RX ADMIN — SODIUM CHLORIDE 500 ML: 0.9 INJECTION, SOLUTION INTRAVENOUS at 04:07

## 2017-07-06 RX ADMIN — ENOXAPARIN SODIUM 40 MG: 100 INJECTION SUBCUTANEOUS at 07:07

## 2017-07-06 RX ADMIN — ARFORMOTEROL TARTRATE 15 MCG: 15 SOLUTION RESPIRATORY (INHALATION) at 07:07

## 2017-07-06 RX ADMIN — NITROGLYCERIN 0.5 INCH: 20 OINTMENT TOPICAL at 01:07

## 2017-07-06 RX ADMIN — ASPIRIN 325 MG ORAL TABLET 325 MG: 325 PILL ORAL at 01:07

## 2017-07-06 NOTE — SUBJECTIVE & OBJECTIVE
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      Current Outpatient Prescriptions on File Prior to Encounter   Medication Sig    albuterol (PROVENTIL HFA) 90 mcg/actuation inhaler Inhale 2 puffs into the lungs every 6 (six) hours as needed for Wheezing. Rescue    aspirin (ECOTRIN) 81 MG EC tablet Take 1 tablet (81 mg total) by mouth once daily.    atorvastatin (LIPITOR) 40 MG tablet Take 1 tablet (40 mg total) by mouth once daily.    b complex vitamins tablet Take 1 tablet by mouth once daily.    BUDESONIDE/FORMOTEROL FUMARATE (SYMBICORT INHL) Inhale into the lungs.    carvedilol (COREG) 3.125 MG tablet Take 1 tablet (3.125 mg total) by mouth 2 (two) times daily.    docusate sodium (COLACE) 100 MG capsule Take 1 capsule (100 mg total) by mouth 2 (two) times daily.    furosemide (LASIX) 20 MG tablet Take 1 tablet (20 mg total) by mouth every 48 hours.    lisinopril (PRINIVIL,ZESTRIL) 2.5 MG tablet Take 1 tablet (2.5 mg total) by mouth once daily.    montelukast (SINGULAIR) 10 mg tablet Take 10 mg by mouth every evening.    umeclidinium (INCRUSE ELLIPTA) 62.5 mcg/actuation DsDv Inhale 62.5 mcg into the lungs once daily. Controller     Family History     Problem Relation (Age of Onset)    Aneurysm Brother    COPD Mother    Cancer Father     No Known Problems Son, Son    Suicide Son        Social History Main Topics    Smoking status: Former Smoker     Packs/day: 1.00     Years: 40.00     Types: Cigarettes     Quit date: 5/1/2017    Smokeless tobacco: Not on file      Comment: decreasing amount of cigarettes    Alcohol use No      Comment: Quit alcohol 5/2017    Drug use: No    Sexual activity: Not on file     Review of Systems   Constitutional: Positive for fatigue. Negative for appetite change, chills, diaphoresis, fever and unexpected weight change.   HENT: Negative for congestion, nosebleeds, sinus pressure and sore throat.    Eyes: Negative for pain, discharge and visual disturbance.   Respiratory: Positive for chest tightness and shortness of breath. Negative for cough, wheezing and stridor.    Cardiovascular: Negative for chest pain, palpitations and leg swelling.   Gastrointestinal: Negative for abdominal distention, abdominal pain, blood in stool, constipation, diarrhea, nausea and vomiting.   Endocrine: Negative for cold intolerance and heat intolerance.   Genitourinary: Negative for difficulty urinating, dysuria, flank pain, frequency and urgency.   Musculoskeletal: Negative for arthralgias, back pain, joint swelling, myalgias, neck pain and neck stiffness.   Skin: Negative for rash and wound.   Allergic/Immunologic: Negative for food allergies and immunocompromised state.   Neurological: Positive for dizziness. Negative for seizures, syncope, facial asymmetry, speech difficulty, weakness, light-headedness, numbness and headaches.   Hematological: Negative for adenopathy.   Psychiatric/Behavioral: Negative for agitation, confusion and hallucinations.     Objective:     Vital Signs (Most Recent):  Temp: 98 °F (36.7 °C) (07/06/17 1122)  Pulse: 109 (07/06/17 1607)  Resp: 12 (07/06/17 1607)  BP: (!) 113/93 (07/06/17 1607)  SpO2: 98 % (07/06/17 1607) Vital Signs (24h Range):  Temp:  [98 °F (36.7 °C)] 98 °F (36.7 °C)  Pulse:  []  109  Resp:  [11-26] 12  SpO2:  [95 %-100 %] 98 %  BP: ()/(53-93) 113/93     Weight: 47.2 kg (104 lb)  Body mass index is 17.85 kg/m².    Physical Exam   Constitutional: She is oriented to person, place, and time. No distress.   Thin, frail   HENT:   Head: Normocephalic and atraumatic.   Nose: Nose normal.   Mouth/Throat: Oropharynx is clear and moist.   Eyes: Conjunctivae and EOM are normal. No scleral icterus.   Neck: Normal range of motion. Neck supple. No tracheal deviation present.   Cardiovascular: Normal rate, regular rhythm, normal heart sounds and intact distal pulses.  Exam reveals no gallop and no friction rub.    No murmur heard.  Pulmonary/Chest: No stridor. No respiratory distress. She has no wheezes. She has no rales. She exhibits no tenderness.   Dyspnea with long sentences  Diminished BS throughout bilaterally      Abdominal: Soft. Bowel sounds are normal. She exhibits no distension and no mass. There is no tenderness. There is no rebound and no guarding.   Musculoskeletal: Normal range of motion. She exhibits no edema, tenderness or deformity.   Neurological: She is alert and oriented to person, place, and time. No cranial nerve deficit. She exhibits normal muscle tone. Coordination normal.   Skin: Skin is warm and dry. No rash noted. She is not diaphoretic. No erythema. No pallor.   Psychiatric: She has a normal mood and affect. Her behavior is normal. Thought content normal.   Nursing note and vitals reviewed.       Significant Labs:   BMP:   Recent Labs  Lab 07/06/17  1150   *      K 4.1      CO2 29   BUN 10   CREATININE 0.7   CALCIUM 9.4     CBC:   Recent Labs  Lab 07/06/17  1150   WBC 6.30   HGB 13.8   HCT 40.8        CMP:   Recent Labs  Lab 07/06/17  1150      K 4.1      CO2 29   *   BUN 10   CREATININE 0.7   CALCIUM 9.4   PROT 6.9   ALBUMIN 3.9   BILITOT 0.6   ALKPHOS 57   AST 25   ALT 31   ANIONGAP 10   EGFRNONAA >60       Significant  Imaging:   Imaging Results          X-Ray Chest AP Portable (Final result)  Result time 07/06/17 12:13:50    Final result by Edison Shrestha MD (07/06/17 12:13:50)                 Impression:     No acute cardiopulmonary disease.            Electronically signed by: EDISON SHRESTHA MD  Date:     07/06/17  Time:    12:13              Narrative:    Exam: XR CHEST AP PORTABLE    Clinical History: Acute onset Chest Pain    Findings:     The lungs are clear but hyperinflated. The cardiac silhouette is within normal limits.

## 2017-07-06 NOTE — ED NOTES
"ANNIE Marks notified of pt's orthostatic vital signs. Nitro paste was removed approximately an hour ago when bp was dropping and she denied c/o chest pain. ANNIE Marks notified that pt took double dose of Lasix, as ordered by her cardiologist. Pt states she told her cardiologist over the phone that she "could feel her heart beating out of her chest when she stands" She states a double dose of Lasix was then ordered.   "

## 2017-07-06 NOTE — ED PROVIDER NOTES
SCRIBE #1 NOTE: I, Martínez Clifford, am scribing for, and in the presence of, Evan Vazquez Jr., MD. I have scribed the entire note.      History      Chief Complaint   Patient presents with    Shortness of Breath     Patient c/o SOB and chest pressure for the last week        Review of patient's allergies indicates:   Allergen Reactions    Codeine Itching    Codeine phosphate      Itchy (skin)^        HPI   HPI    7/6/2017, 11:35 AM   History obtained from the patient      History of Present Illness: Jeniffer Fernandez is a 57 y.o. female patient who presents to the Emergency Department for SOB which onset gradually a week ago. Symptoms are constant and moderate in severity. Sx are exacerbated by nothing and relieved by nothing. Associated sxs include chest tightness. Pt reports having to use her inhaler and nebulizer more than usual for her SOB but reports no relief. Pt states she is on home O2. Pt states her nurse called Dr. Chadwick, her cardiologist, yesterday regarding her sxs who instructed pt to go from taking lasix once every other day to everyday. Pt states she sees Dr. Broussard as well. Pt reports that she quit smoking in May, 2017. Pt reports Hx of COPD. Patient denies any fever, N/V/D, chills, palpitations, leg swelling, lightheadedness, dizziness, weakness/numbness, cough, congestion and all other sxs at this time. No further complaints or concerns at this time.     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     Respiratory distress     SOB (shortness of breath)        Past Surgical History:  Past Surgical History:   Procedure Laterality Date     COLONOSCOPY      surgery of proximal humerus      TUBAL LIGATION           Family History:  Family History   Problem Relation Age of Onset    COPD Mother     Cancer Father      brain    Aneurysm Brother     Suicide Son     No Known Problems Son     No Known Problems Son        Social History:  Social History     Social History Main Topics    Smoking status: Former Smoker     Packs/day: 0.50     Years: 35.00     Types: Cigarettes     Quit date: 5/1/2017    Smokeless tobacco: Not on file      Comment: decreasing amount of cigarettes    Alcohol use No    Drug use: No    Sexual activity: Not on file       ROS   Review of Systems   Constitutional: Negative for chills and fever.   HENT: Negative for congestion and sore throat.    Respiratory: Positive for shortness of breath. Negative for cough and chest tightness.    Cardiovascular: Positive for chest pain. Negative for palpitations and leg swelling.   Gastrointestinal: Negative for abdominal pain, nausea and vomiting.   Genitourinary: Negative for difficulty urinating and dysuria.   Musculoskeletal: Negative for back pain and neck pain.   Skin: Negative for rash.   Neurological: Negative for dizziness, weakness, light-headedness, numbness and headaches.   Psychiatric/Behavioral: Negative for agitation and confusion.   All other systems reviewed and are negative.      Physical Exam      Initial Vitals [07/06/17 1122]   BP Pulse Resp Temp SpO2   137/82 (!) 115 20 98 °F (36.7 °C) --      MAP       100.33          Physical Exam  Nursing Notes and Vital Signs Reviewed.  Constitutional: Patient is in mild distress. Well-developed and well-nourished.  Head: Atraumatic. Normocephalic.  Eyes: PERRL. EOM intact. Conjunctivae are not pale. No scleral icterus.  ENT: Mucous membranes are moist. Oropharynx is clear and symmetric.    Neck: Supple. Full ROM. No lymphadenopathy.  Cardiovascular: Regular rate. Regular rhythm. No murmurs, rubs, or gallops. Distal pulses are  "2+ and symmetric.  Pulmonary/Chest: Mild respiratory distress. Diminished breath sounds noted with mild to moderate expiratory wheezing. No rales or rhonchi.    Abdominal: Soft and non-distended.  There is no tenderness.  No rebound, guarding, or rigidity. Good bowel sounds.  Musculoskeletal: Moves all extremities. No obvious deformities. No edema. No calf tenderness.  Skin: Warm and dry.  Neurological:  Alert, awake, and appropriate.  Normal speech.  No acute focal neurological deficits are appreciated.  Psychiatric: Normal affect. Good eye contact. Appropriate in content.    ED Course    Procedures  ED Vital Signs:  Vitals:    07/06/17 1122 07/06/17 1155 07/06/17 1156 07/06/17 1200   BP: 137/82      Pulse: (!) 115 97 93 97   Resp: 20 16 16 12   Temp: 98 °F (36.7 °C)      TempSrc: Oral      SpO2:  99% 100% 100%   Weight: 47.2 kg (104 lb)      Height: 5' 4" (1.626 m)       07/06/17 1205 07/06/17 1207 07/06/17 1254 07/06/17 1325   BP:  130/77  122/68   Pulse: 97 96 97 90   Resp: 14 15 16 18   Temp:       TempSrc:       SpO2: 100% 100% 100% 99%   Weight:       Height:        07/06/17 1329 07/06/17 1351 07/06/17 1430   BP:  120/75 112/68   Pulse: 92 95 96   Resp:  18 18   Temp:      TempSrc:      SpO2:  99% 99%   Weight:      Height:          Abnormal Lab Results:  Labs Reviewed   CBC W/ AUTO DIFFERENTIAL - Abnormal; Notable for the following:        Result Value    MCH 32.5 (*)     All other components within normal limits   COMPREHENSIVE METABOLIC PANEL - Abnormal; Notable for the following:     Glucose 123 (*)     All other components within normal limits   TROPONIN I   B-TYPE NATRIURETIC PEPTIDE   TROPONIN I        All Lab Results:  Results for orders placed or performed during the hospital encounter of 07/06/17   CBC auto differential   Result Value Ref Range    WBC 6.30 3.90 - 12.70 K/uL    RBC 4.25 4.00 - 5.40 M/uL    Hemoglobin 13.8 12.0 - 16.0 g/dL    Hematocrit 40.8 37.0 - 48.5 %    MCV 96 82 - 98 fL    MCH " 32.5 (H) 27.0 - 31.0 pg    MCHC 33.8 32.0 - 36.0 %    RDW 14.1 11.5 - 14.5 %    Platelets 155 150 - 350 K/uL    MPV 11.1 9.2 - 12.9 fL    Gran # 3.1 1.8 - 7.7 K/uL    Lymph # 2.1 1.0 - 4.8 K/uL    Mono # 0.8 0.3 - 1.0 K/uL    Eos # 0.4 0.0 - 0.5 K/uL    Baso # 0.03 0.00 - 0.20 K/uL    Gran% 48.6 38.0 - 73.0 %    Lymph% 32.7 18.0 - 48.0 %    Mono% 12.5 4.0 - 15.0 %    Eosinophil% 5.7 0.0 - 8.0 %    Basophil% 0.5 0.0 - 1.9 %    Differential Method Automated    Comprehensive metabolic panel   Result Value Ref Range    Sodium 143 136 - 145 mmol/L    Potassium 4.1 3.5 - 5.1 mmol/L    Chloride 104 95 - 110 mmol/L    CO2 29 23 - 29 mmol/L    Glucose 123 (H) 70 - 110 mg/dL    BUN, Bld 10 6 - 20 mg/dL    Creatinine 0.7 0.5 - 1.4 mg/dL    Calcium 9.4 8.7 - 10.5 mg/dL    Total Protein 6.9 6.0 - 8.4 g/dL    Albumin 3.9 3.5 - 5.2 g/dL    Total Bilirubin 0.6 0.1 - 1.0 mg/dL    Alkaline Phosphatase 57 55 - 135 U/L    AST 25 10 - 40 U/L    ALT 31 10 - 44 U/L    Anion Gap 10 8 - 16 mmol/L    eGFR if African American >60 >60 mL/min/1.73 m^2    eGFR if non African American >60 >60 mL/min/1.73 m^2   Troponin I #1   Result Value Ref Range    Troponin I 0.015 0.000 - 0.026 ng/mL   B-Type natriuretic peptide (BNP)   Result Value Ref Range    BNP 16 0 - 99 pg/mL         Imaging Results:  Imaging Results          X-Ray Chest AP Portable (Final result)  Result time 07/06/17 12:13:50    Final result by Edison Shrestha MD (07/06/17 12:13:50)                 Impression:     No acute cardiopulmonary disease.            Electronically signed by: EDISON SHRESTHA MD  Date:     07/06/17  Time:    12:13              Narrative:    Exam: XR CHEST AP PORTABLE    Clinical History: Acute onset Chest Pain    Findings:     The lungs are clear but hyperinflated. The cardiac silhouette is within normal limits.                               The EKG was ordered, reviewed, and independently interpreted by the ED provider.  Interpretation time: 1130  Rate: 100  BPM  Rhythm: normal sinus rhythm  Interpretation: R atrial enlargement. No STEMI.         The Emergency Provider reviewed the vital signs and test results, which are outlined above.    ED Discussion     1:29 PM: Re-evaluated pt. Pt is now c/o chest pressure. I have discussed test results, shared treatment plan, and the need for admission with patient and family at bedside. Pt and family express understanding at this time and agree with all information. All questions answered. Pt and family have no further questions or concerns at this time. Pt is ready for admit.    1:36 PM: Discussed case with Selina ([facility]). Dr. Landon agrees with current care and management of pt and accepts admission.   Admitting Service: Hospital medicine   Admitting Physician: Dr. Landon  Admit to: Obs      ED Medication(s):  Medications   nitroGLYCERIN 2% TD oint ointment 0.5 inch (0.5 inches Topical Given 7/6/17 1347)   albuterol-ipratropium 2.5mg-0.5mg/3mL nebulizer solution 3 mL (3 mLs Nebulization Given 7/6/17 1205)   methylPREDNISolone sod suc(PF) injection 125 mg (125 mg Intravenous Given 7/6/17 1204)   aspirin tablet 325 mg (325 mg Oral Given 7/6/17 1346)       New Prescriptions    No medications on file             Medical Decision Making    Medical Decision Making:   Clinical Tests:   Lab Tests: Reviewed and Ordered  Radiological Study: Ordered and Reviewed  Medical Tests: Reviewed and Ordered           Scribe Attestation:   Scribe #1: I performed the above scribed service and the documentation accurately describes the services I performed. I attest to the accuracy of the note.    Attending:   Physician Attestation Statement for Scribe #1: I, Evan Vazquez Jr., MD, personally performed the services described in this documentation, as scribed by Martínez Clifford, in my presence, and it is both accurate and complete.          Clinical Impression       ICD-10-CM ICD-9-CM   1. Chest pain, unspecified type R07.9 786.50   2. Chronic  obstructive pulmonary disease, unspecified COPD type J44.9 496       Disposition:   Disposition: Placed in Observation  Condition: Stable         Evan Vazquez Jr., MD  07/06/17 1530

## 2017-07-06 NOTE — HPI
The pt is a 58 yo female with severe COPD- on home oxygen, recent NSTEMI 5/17 with medical management, CHF with EF 30% who presented to ED with SOB and chest. Pt reports FRANCES x one week, however in last 24 hours, she had SOB at rest. She report chest  tightness x one week as well that comes and goes to substernal area rated 5/10 lasting approx 10-15 min -denies any other associated symptoms besides SOB and denies exacerbating or alleviating factors. Pt had called her Cardiologist, Dr. Chadwick last week who placed her on LAsix with no improvement.   In the ED, EKG showed NSR-no acute ST or T wave changes. Troponin normal. WBC normal. Afebrile. CXR clear. + orthostasis with BP dropping in 80s and pulse increasing to 125b/min.  Of note, the pt is refusing steroids due to severe delirium and agitation with steroids during her last hospitalization.

## 2017-07-06 NOTE — ED NOTES
Pt resting quietly with zero complaints and no distress noted. Spouse at bedside, will continue to monitor and assist.  Pt denies any c/o chest pain or shortness of breath. o2 remains at 2lpm via nasal cannula

## 2017-07-06 NOTE — ED NOTES
Pt is resting quietly with zero complaints and no distress noted. Family remains at the bedside, will continue to monitor and assist.

## 2017-07-06 NOTE — ED NOTES
Pt resting quietly with zero complaints and no distress noted. Family member remains at the bedside. Will continue to monitor and assist. Pt denies any further c/o chest pain but continues to have dyspnea on exertion

## 2017-07-06 NOTE — ED NOTES
Pt has absolutely no swelling noted to BLE, states she took a double dose of lasix as ordered by her Cardiologist

## 2017-07-06 NOTE — H&P
[facility] Hospital Medicine  History & Physical    Patient Name: Jeniffer Fernandez  MRN: [id number]  Admission Date: 7/6/2017  Attending Physician: Dr. Landon  Primary Care Provider: Ben Pelaez Jr, MD         Patient information was obtained from patient and ER records.     Subjective:     Principal Problem:COPD exacerbation    Chief Complaint:   Chief Complaint   Patient presents with    Shortness of Breath     Patient c/o SOB and chest pressure for the last week         HPI: The pt is a 58 yo female with severe COPD- on home oxygen, recent NSTEMI 5/17 with medical management, CHF with EF 30% who presented to ED with SOB and chest. Pt reports FRANCES x one week, however in last 24 hours, she had SOB at rest. She report chest  tightness x one week as well that comes and goes to substernal area rated 5/10 lasting approx 10-15 min -denies any other associated symptoms besides SOB and denies exacerbating or alleviating factors. Pt had called her Cardiologist, Dr. Chadwick last week who placed her on LAsix with no improvement.   In the ED, EKG showed NSR-no acute ST or T wave changes. Troponin normal. WBC normal. Afebrile. CXR clear. + orthostasis with BP dropping in 80s and pulse increasing to 125b/min.  Of note, the pt is refusing steroids due to severe delirium and agitation with steroids during her last hospitalization.    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      Current Outpatient Prescriptions on File Prior to Encounter   Medication Sig    albuterol (PROVENTIL HFA) 90 mcg/actuation inhaler Inhale 2 puffs into the lungs every 6 (six) hours as needed for Wheezing. Rescue    aspirin (ECOTRIN) 81 MG EC tablet Take 1 tablet (81 mg total) by mouth once daily.    atorvastatin (LIPITOR) 40 MG tablet Take 1 tablet (40 mg total) by mouth once daily.    b complex vitamins tablet Take 1 tablet by mouth once daily.    BUDESONIDE/FORMOTEROL FUMARATE (SYMBICORT INHL) Inhale into the lungs.    carvedilol (COREG) 3.125 MG tablet Take 1 tablet (3.125 mg total) by mouth 2 (two) times daily.    docusate sodium (COLACE) 100 MG capsule Take 1 capsule (100 mg total) by mouth 2 (two) times daily.    furosemide (LASIX) 20 MG tablet Take 1 tablet (20 mg total) by mouth every 48 hours.    lisinopril (PRINIVIL,ZESTRIL) 2.5 MG tablet Take 1 tablet (2.5 mg total) by mouth once daily.    montelukast (SINGULAIR) 10 mg tablet Take 10 mg by mouth every evening.    umeclidinium (INCRUSE ELLIPTA) 62.5 mcg/actuation DsDv Inhale 62.5 mcg into the lungs once daily. Controller     Family History     Problem Relation (Age of Onset)    Aneurysm Brother    COPD Mother    Cancer Father    No Known Problems Son, Son    Suicide Son        Social History Main Topics    Smoking status: Former Smoker     Packs/day: 1.00     Years: 40.00     Types: Cigarettes     Quit date: 5/1/2017    Smokeless tobacco: Not on file      Comment: decreasing amount of cigarettes    Alcohol use No      Comment: Quit alcohol 5/2017    Drug use: No    Sexual activity: Not on file     Review of Systems   Constitutional: Positive for fatigue. Negative for appetite change, chills, diaphoresis, fever and unexpected weight change.   HENT: Negative for congestion, nosebleeds, sinus pressure and  sore throat.    Eyes: Negative for pain, discharge and visual disturbance.   Respiratory: Positive for chest tightness and shortness of breath. Negative for cough, wheezing and stridor.    Cardiovascular: Negative for chest pain, palpitations and leg swelling.   Gastrointestinal: Negative for abdominal distention, abdominal pain, blood in stool, constipation, diarrhea, nausea and vomiting.   Endocrine: Negative for cold intolerance and heat intolerance.   Genitourinary: Negative for difficulty urinating, dysuria, flank pain, frequency and urgency.   Musculoskeletal: Negative for arthralgias, back pain, joint swelling, myalgias, neck pain and neck stiffness.   Skin: Negative for rash and wound.   Allergic/Immunologic: Negative for food allergies and immunocompromised state.   Neurological: Positive for dizziness. Negative for seizures, syncope, facial asymmetry, speech difficulty, weakness, light-headedness, numbness and headaches.   Hematological: Negative for adenopathy.   Psychiatric/Behavioral: Negative for agitation, confusion and hallucinations.     Objective:     Vital Signs (Most Recent):  Temp: 98 °F (36.7 °C) (07/06/17 1122)  Pulse: 109 (07/06/17 1607)  Resp: 12 (07/06/17 1607)  BP: (!) 113/93 (07/06/17 1607)  SpO2: 98 % (07/06/17 1607) Vital Signs (24h Range):  Temp:  [98 °F (36.7 °C)] 98 °F (36.7 °C)  Pulse:  [] 109  Resp:  [11-26] 12  SpO2:  [95 %-100 %] 98 %  BP: ()/(53-93) 113/93     Weight: 47.2 kg (104 lb)  Body mass index is 17.85 kg/m².    Physical Exam   Constitutional: She is oriented to person, place, and time. No distress.   Thin, frail   HENT:   Head: Normocephalic and atraumatic.   Nose: Nose normal.   Mouth/Throat: Oropharynx is clear and moist.   Eyes: Conjunctivae and EOM are normal. No scleral icterus.   Neck: Normal range of motion. Neck supple. No tracheal deviation present.   Cardiovascular: Normal rate, regular rhythm, normal heart sounds and intact distal pulses.  Exam  reveals no gallop and no friction rub.    No murmur heard.  Pulmonary/Chest: No stridor. No respiratory distress. She has no wheezes. She has no rales. She exhibits no tenderness.   Dyspnea with long sentences  Diminished BS throughout bilaterally      Abdominal: Soft. Bowel sounds are normal. She exhibits no distension and no mass. There is no tenderness. There is no rebound and no guarding.   Musculoskeletal: Normal range of motion. She exhibits no edema, tenderness or deformity.   Neurological: She is alert and oriented to person, place, and time. No cranial nerve deficit. She exhibits normal muscle tone. Coordination normal.   Skin: Skin is warm and dry. No rash noted. She is not diaphoretic. No erythema. No pallor.   Psychiatric: She has a normal mood and affect. Her behavior is normal. Thought content normal.   Nursing note and vitals reviewed.       Significant Labs:   BMP:   Recent Labs  Lab 07/06/17  1150   *      K 4.1      CO2 29   BUN 10   CREATININE 0.7   CALCIUM 9.4     CBC:   Recent Labs  Lab 07/06/17  1150   WBC 6.30   HGB 13.8   HCT 40.8        CMP:   Recent Labs  Lab 07/06/17  1150      K 4.1      CO2 29   *   BUN 10   CREATININE 0.7   CALCIUM 9.4   PROT 6.9   ALBUMIN 3.9   BILITOT 0.6   ALKPHOS 57   AST 25   ALT 31   ANIONGAP 10   EGFRNONAA >60       Significant Imaging:   Imaging Results          X-Ray Chest AP Portable (Final result)  Result time 07/06/17 12:13:50    Final result by Edison Shrestha MD (07/06/17 12:13:50)                 Impression:     No acute cardiopulmonary disease.            Electronically signed by: EDISON SHRESTHA MD  Date:     07/06/17  Time:    12:13              Narrative:    Exam: XR CHEST AP PORTABLE    Clinical History: Acute onset Chest Pain    Findings:     The lungs are clear but hyperinflated. The cardiac silhouette is within normal limits.                            Assessment/Plan:     Chest pain    Serial troponin  ASA,  Coreg, Statin             * COPD exacerbation    Neb txs  Pt refusing steroids   IV Avelox          Orthostasis    IVVD  Normal saline 250 bolus  Hold Lasix and Lisinopril for now           Pulmonary cachexia due to chronic obstructive pulmonary disease    Boost TID          Chronic systolic congestive heart failure    Compensated  Hold Lasix and Lisinopril for now  Cont Coreg             VTE Risk Mitigation         Ordered     Medium Risk of VTE  Once      07/06/17 1342     Place sequential compression device  Until discontinued      07/06/17 1342        Selina Scruggs NP  Department of Hospital Medicine   [facility]

## 2017-07-07 VITALS
HEIGHT: 64 IN | DIASTOLIC BLOOD PRESSURE: 81 MMHG | SYSTOLIC BLOOD PRESSURE: 117 MMHG | HEART RATE: 85 BPM | TEMPERATURE: 99 F | OXYGEN SATURATION: 99 % | BODY MASS INDEX: 17.75 KG/M2 | WEIGHT: 104 LBS | RESPIRATION RATE: 18 BRPM

## 2017-07-07 PROBLEM — R07.9 CHEST PAIN: Status: RESOLVED | Noted: 2017-07-06 | Resolved: 2017-07-07

## 2017-07-07 PROBLEM — J44.1 COPD EXACERBATION: Status: RESOLVED | Noted: 2017-05-14 | Resolved: 2017-07-07

## 2017-07-07 LAB
ANION GAP SERPL CALC-SCNC: 10 MMOL/L
BASOPHILS # BLD AUTO: 0.01 K/UL
BASOPHILS NFR BLD: 0.1 %
BUN SERPL-MCNC: 11 MG/DL
CALCIUM SERPL-MCNC: 9.3 MG/DL
CHLORIDE SERPL-SCNC: 106 MMOL/L
CO2 SERPL-SCNC: 25 MMOL/L
CREAT SERPL-MCNC: 0.7 MG/DL
DIFFERENTIAL METHOD: ABNORMAL
EOSINOPHIL # BLD AUTO: 0 K/UL
EOSINOPHIL NFR BLD: 0.1 %
ERYTHROCYTE [DISTWIDTH] IN BLOOD BY AUTOMATED COUNT: 14 %
EST. GFR  (AFRICAN AMERICAN): >60 ML/MIN/1.73 M^2
EST. GFR  (NON AFRICAN AMERICAN): >60 ML/MIN/1.73 M^2
GLUCOSE SERPL-MCNC: 148 MG/DL
HCT VFR BLD AUTO: 38.4 %
HGB BLD-MCNC: 12.8 G/DL
LYMPHOCYTES # BLD AUTO: 1.1 K/UL
LYMPHOCYTES NFR BLD: 12.7 %
MCH RBC QN AUTO: 31.9 PG
MCHC RBC AUTO-ENTMCNC: 33.3 %
MCV RBC AUTO: 96 FL
MONOCYTES # BLD AUTO: 0.8 K/UL
MONOCYTES NFR BLD: 9 %
NEUTROPHILS # BLD AUTO: 6.9 K/UL
NEUTROPHILS NFR BLD: 78.1 %
PLATELET # BLD AUTO: 180 K/UL
PMV BLD AUTO: 11.7 FL
POTASSIUM SERPL-SCNC: 4.5 MMOL/L
RBC # BLD AUTO: 4.01 M/UL
SODIUM SERPL-SCNC: 141 MMOL/L
TROPONIN I SERPL DL<=0.01 NG/ML-MCNC: 0.02 NG/ML
TROPONIN I SERPL DL<=0.01 NG/ML-MCNC: 0.02 NG/ML
WBC # BLD AUTO: 8.87 K/UL

## 2017-07-07 PROCEDURE — 80048 BASIC METABOLIC PNL TOTAL CA: CPT

## 2017-07-07 PROCEDURE — 27000221 HC OXYGEN, UP TO 24 HOURS

## 2017-07-07 PROCEDURE — 25000242 PHARM REV CODE 250 ALT 637 W/ HCPCS: Performed by: NURSE PRACTITIONER

## 2017-07-07 PROCEDURE — 94640 AIRWAY INHALATION TREATMENT: CPT

## 2017-07-07 PROCEDURE — 63600175 PHARM REV CODE 636 W HCPCS: Performed by: NURSE PRACTITIONER

## 2017-07-07 PROCEDURE — G0378 HOSPITAL OBSERVATION PER HR: HCPCS

## 2017-07-07 PROCEDURE — 85025 COMPLETE CBC W/AUTO DIFF WBC: CPT

## 2017-07-07 PROCEDURE — 25000003 PHARM REV CODE 250: Performed by: NURSE PRACTITIONER

## 2017-07-07 PROCEDURE — 84484 ASSAY OF TROPONIN QUANT: CPT | Mod: 91

## 2017-07-07 PROCEDURE — 84484 ASSAY OF TROPONIN QUANT: CPT

## 2017-07-07 PROCEDURE — 99219 PR INITIAL OBSERVATION CARE,LEVL II: CPT | Mod: ,,, | Performed by: INTERNAL MEDICINE

## 2017-07-07 PROCEDURE — 36415 COLL VENOUS BLD VENIPUNCTURE: CPT

## 2017-07-07 RX ADMIN — BUDESONIDE 0.5 MG: 0.5 SUSPENSION RESPIRATORY (INHALATION) at 07:07

## 2017-07-07 RX ADMIN — ARFORMOTEROL TARTRATE 15 MCG: 15 SOLUTION RESPIRATORY (INHALATION) at 07:07

## 2017-07-07 RX ADMIN — IPRATROPIUM BROMIDE AND ALBUTEROL SULFATE 3 ML: .5; 3 SOLUTION RESPIRATORY (INHALATION) at 12:07

## 2017-07-07 RX ADMIN — CARVEDILOL 3.12 MG: 3.12 TABLET, FILM COATED ORAL at 09:07

## 2017-07-07 RX ADMIN — ATORVASTATIN CALCIUM 40 MG: 40 TABLET, FILM COATED ORAL at 09:07

## 2017-07-07 RX ADMIN — IPRATROPIUM BROMIDE AND ALBUTEROL SULFATE 3 ML: .5; 3 SOLUTION RESPIRATORY (INHALATION) at 07:07

## 2017-07-07 RX ADMIN — ASPIRIN 81 MG: 81 TABLET, COATED ORAL at 09:07

## 2017-07-07 NOTE — PLAN OF CARE
CM met with patient regarding discharge planning.  The patient plans to return home where she lives with her son.  The patient reports she receives HH services from [facility].  The patient reports she is currently in the doughnut hole and her medication cost has increased.  CM offered the patient xtra assistance application and the patient refused.  The patient reports she is currently paying the IRS back for receiving that service and did not qualify.  No post hospital needs noted at this time.      07/07/17 1156   Discharge Assessment   Assessment Type Discharge Planning Assessment   Confirmed/corrected address and phone number on facesheet? Yes   Assessment information obtained from? Patient   Expected Length of Stay (days) 1   Communicated expected length of stay with patient/caregiver yes   Prior to hospitilization cognitive status: Alert/Oriented   Prior to hospitalization functional status: Independent   Current cognitive status: Alert/Oriented   Current Functional Status: Independent   Arrived From home or self-care   Lives With child(aleyda), adult   Able to Return to Prior Arrangements yes   Is patient able to care for self after discharge? Yes   How many people do you have in your home that can help with your care after discharge? 1   Patient's perception of discharge disposition home or selfcare   Readmission Within The Last 30 Days no previous admission in last 30 days   Patient currently being followed by outpatient case management? No   Patient currently receives home health services? Yes   Patient previously received home health services and would like to resume services if necessary? Yes   If yes, name of home health provider: [facility]   Does the patient currently use HME? Yes   Patient currently receives private duty nursing? No   Patient currently receives any other outside agency services? No   Equipment Currently Used at Home oxygen  (nebulizer)   Do you have any problems affording any  of your prescribed medications? Yes   If yes, what medications? symbicort   Is the patient taking medications as prescribed? yes   Do you have any financial concerns preventing you from receiving the healthcare you need? No   Does the patient have transportation to healthcare appointments? Yes   Transportation Available family or friend will provide   On Dialysis? No   Does the patient receive services at the [facility]? No   Are there any open cases? No   Discharge Plan A Home   Discharge Plan B Home   Patient/Family In Agreement With Plan yes

## 2017-07-07 NOTE — PROGRESS NOTES
Pt discharged per MD orders.  IV and tele monitor removed.  Discharge instructions given to Pt.  Pt verbalized understanding.  Pt transported to private vehicle via wheelchair.  Pt has her own 02 on.

## 2017-07-07 NOTE — CONSULTS
Consult Note  Cardiology    Consult Requested By: Dr. Landon  Reason for Consult: chest pain.    SUBJECTIVE:     History of Present Illness:  Patient is a 57 y.o. female with history of CAD, NSTEMI, ECMP, EF 25%, COPD on home oxygen, and former smoker presents to ER with SOB and pressure in her chest with increased heart rate with very little exertion. She had increased her lasix due to weight gain and increased SOB. She presented with orthostatic hypotension.    No current facility-administered medications on file prior to encounter.      Current Outpatient Prescriptions on File Prior to Encounter   Medication Sig    albuterol (PROVENTIL HFA) 90 mcg/actuation inhaler Inhale 2 puffs into the lungs every 6 (six) hours as needed for Wheezing. Rescue    aspirin (ECOTRIN) 81 MG EC tablet Take 1 tablet (81 mg total) by mouth once daily.    atorvastatin (LIPITOR) 40 MG tablet Take 1 tablet (40 mg total) by mouth once daily.    b complex vitamins tablet Take 1 tablet by mouth once daily.    BUDESONIDE/FORMOTEROL FUMARATE (SYMBICORT INHL) Inhale into the lungs 2 (two) times daily.     carvedilol (COREG) 3.125 MG tablet Take 1 tablet (3.125 mg total) by mouth 2 (two) times daily.    furosemide (LASIX) 20 MG tablet Take 1 tablet (20 mg total) by mouth every 48 hours. (Patient taking differently: Take 20 mg by mouth once daily. )    lisinopril (PRINIVIL,ZESTRIL) 2.5 MG tablet Take 1 tablet (2.5 mg total) by mouth once daily.    montelukast (SINGULAIR) 10 mg tablet Take 10 mg by mouth daily as needed.     umeclidinium (INCRUSE ELLIPTA) 62.5 mcg/actuation DsDv Inhale 62.5 mcg into the lungs once daily. Controller    docusate sodium (COLACE) 100 MG capsule Take 1 capsule (100 mg total) by mouth 2 (two) times daily.       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     Respiratory distress     SOB (shortness of breath)      Past Surgical History:   Procedure Laterality Date    COLONOSCOPY      surgery of proximal humerus      TUBAL LIGATION       Family History   Problem Relation Age of Onset    COPD Mother     Cancer Father      brain    Aneurysm Brother     Suicide Son     No Known Problems Son     No Known Problems Son      Social History   Substance Use Topics    Smoking status: Former Smoker     Packs/day: 1.00     Years: 40.00     Types: Cigarettes     Quit date: 5/1/2017    Smokeless tobacco: Not on file      Comment: decreasing amount of cigarettes    Alcohol use No      Comment: Quit alcohol 5/2017        Review of Systems:  Constitutional: negative for chills, fatigue, fevers and malaise  Respiratory: positive for dyspnea on exertion; negative for hemoptysis, pleurisy/chest pain, sputum, stridor and wheezing  Cardiovascular: negative for claudication, palpitations, lower extremity edema, near-syncope, orthopnea and paroxysmal nocturnal dyspnea  Gastrointestinal: negative for abdominal pain, change in bowel habits, dysphagia, melena, nausea and vomiting  Musculoskeletal:negative for muscle weakness and neck pain  Neurological: negative for dizziness, paresthesia and weakness, and lightheadedness  Psych - she complains of anxiety.      OBJECTIVE:     Vital Signs (Most Recent)  Temp: 98.3 °F (36.8 °C) (07/07/17 0729)  Pulse: 85 (07/07/17 0756)  Resp: 18 (07/07/17 0756)  BP: 107/75 (07/07/17 0729)  SpO2: 99 % (07/07/17 0756)    Vital Signs Range (Last 24H):  Temp:  [97.5 °F (36.4 °C)-98.3 °F (36.8 °C)]   Pulse:  []   Resp:  [11-26]   BP: ()/(53-93)   SpO2:  [95 %-100 %]     I/O last 3  completed shifts:  In: -   Out: 600 [Urine:600]  No intake/output data recorded.    Physical Exam:  General: Alert and oriented x3, calm and cooperative; NAD; skin w/d/pink  Neck: supple; No JVD  CV: S1S2, RRR, no MGR  Lungs: Respirations spontaneous, even, and nonlabored. Lungs sounds diminished bilaterally  Abd: soft, NT/ND; bowel sounds present in all quadrants.  Ext: No c/c/e; radial and DP pulses 2+ bilaterally      Laboratory:  Chemistry:  Lab Results   Component Value Date     07/07/2017    K 4.5 07/07/2017     07/07/2017    CO2 25 07/07/2017    BUN 11 07/07/2017    CREATININE 0.7 07/07/2017    CALCIUM 9.3 07/07/2017    BNP 16 07/06/2017     Cardiac Markers:  Lab Results   Component Value Date    TROPONINI 0.023 07/07/2017     Cardiac Markers (Last 3):  Lab Results   Component Value Date    TROPONINI 0.023 07/07/2017    TROPONINI 0.016 07/07/2017    TROPONINI 0.021 07/06/2017     CBC:   Lab Results   Component Value Date    WBC 8.87 07/07/2017    HGB 12.8 07/07/2017    HCT 38.4 07/07/2017    MCV 96 07/07/2017     07/07/2017     Lipids:No results found for: CHOL, TRIG, HDL, LDLDIRECT  Coagulation:   Lab Results   Component Value Date    INR 1.0 05/20/2017    APTT 37.1 (H) 05/23/2017       Diagnostic Results:  Labs: Reviewed  ECG: Reviewed  X-Ray: Reviewed  Telemetry: NSR    ASSESSMENT/PLAN:   57-year-old female with history of NSTEMI, ICMP, EF25%, COPD, on home oxygen admitted with SOB and chest pain.    Chest pain-Troponin is normal and EKG does not show any signs of ischemia. Her volume status appears dry. Hold Lasix, and continue lisinopril and carvedilol for now. The chest tightness and pressure likely from  dehydration and COPD. Gentle hydration. From a cardiac standpoint, she appears clinically stable.    Sarah Bowman NP  [facility]

## 2017-07-07 NOTE — HOSPITAL COURSE
The patient reports that chest pain has resolved. Cardiology evaluated the patient and felt like the chest tightness and pressure were likely from dehydration and COPD. Troponin were negative. The patient was seen and examined today and deemed stable for discharge. The patient had no noted wheezing on examination. The patient maintained a O2 sat of 95-98% with ambulation while on 2LNC which is her home O2 requirement. The patient will keep her appointment scheduled on Monday with Dr. Broussard (Cardiology) and will follow up with her PCP.

## 2017-07-07 NOTE — DISCHARGE SUMMARY
[facility] Hospital Medicine  Discharge Summary      Patient Name: Jeniffer Fernandez  MRN: [id number]  Admission Date: 7/6/2017  Hospital Length of Stay: 0 days  Discharge Date and Time:  07/07/2017 3:21 PM  Attending Physician: Rachelle Landon RN   Discharging Provider: Marbin Reagan NP  Primary Care Provider: Ben Pelaez Jr, MD      HPI:   The pt is a 56 yo female with severe COPD- on home oxygen, recent NSTEMI 5/17 with medical management, CHF with EF 30% who presented to ED with SOB and chest. Pt reports FRANCES x one week, however in last 24 hours, she had SOB at rest. She report chest  tightness x one week as well that comes and goes to substernal area rated 5/10 lasting approx 10-15 min -denies any other associated symptoms besides SOB and denies exacerbating or alleviating factors. Pt had called her Cardiologist, Dr. Chadwick last week who placed her on LAsix with no improvement.   In the ED, EKG showed NSR-no acute ST or T wave changes. Troponin normal. WBC normal. Afebrile. CXR clear. + orthostasis with BP dropping in 80s and pulse increasing to 125b/min.  Of note, the pt is refusing steroids due to severe delirium and agitation with steroids during her last hospitalization.    * No surgery found *      Indwelling Lines/Drains at time of discharge:   Lines/Drains/Airways          No matching active lines, drains, or airways        Hospital Course:   The patient reports that chest pain has resolved. Cardiology evaluated the patient and felt like the chest tightness and pressure were likely from dehydration and COPD. Troponin were negative. The patient was seen and examined today and deemed stable for discharge. The patient had no noted wheezing on examination. The patient maintained a O2 sat of 95-98% with ambulation while on 2LNC which is her home O2 requirement. The patient will keep her appointment scheduled on Monday with Dr. Broussard (Cardiology) and will follow up with her PCP.       Consults:   Consults         Status Ordering Provider     Inpatient consult to Cardiology  Once     Specialty:  Cardiology  Provider:  Tone Chadwick MD    Completed CHIN MADSEN          Significant Diagnostic Studies:   Imaging Results          X-Ray Chest AP Portable (Final result)  Result time 07/06/17 12:13:50    Final result by Eidson Olivier MD (07/06/17 12:13:50)                 Impression:     No acute cardiopulmonary disease.            Electronically signed by: EDISON OLIVIER MD  Date:     07/06/17  Time:    12:13              Narrative:    Exam: XR CHEST AP PORTABLE    Clinical History: Acute onset Chest Pain    Findings:     The lungs are clear but hyperinflated. The cardiac silhouette is within normal limits.                               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  Service: Hospital Medicine      Discharged Condition: stable    Disposition: Home or Self Care    Follow Up:  Follow-up Information     Ben Pelaez Jr, MD. Schedule an appointment as soon as possible for a visit in 3 days.    Specialty:  Family Medicine  Contact information:  [address]  [place] [address]  [phone]             Go to  Angel Broussard MD.    Specialty:  Pulmonary Disease  Why:  appointment on Monday  Contact information:  [address]  [place] [address] [phone]                 Patient Instructions:   No discharge procedures on file.  Medications:  Reconciled Home Medications:   Current Discharge Medication List      CONTINUE these medications which have NOT CHANGED    Details   albuterol (PROVENTIL HFA) 90 mcg/actuation inhaler Inhale 2 puffs into the lungs every 6 (six) hours as needed for Wheezing. Rescue      aspirin (ECOTRIN) 81 MG EC tablet Take 1 tablet (81 mg total) by mouth once daily.  Refills: 0      atorvastatin (LIPITOR) 40 MG tablet Take 1 tablet (40 mg total) by mouth once daily.  Qty: 30 tablet, Refills: 1      b complex vitamins tablet Take 1 tablet by mouth once daily.      BUDESONIDE/FORMOTEROL FUMARATE (SYMBICORT INHL) Inhale into the lungs 2 (two) times daily.       carvedilol (COREG) 3.125 MG tablet Take 1 tablet (3.125 mg total) by mouth 2 (two) times daily.  Qty: 60 tablet, Refills: 1      furosemide (LASIX) 20 MG tablet Take 1 tablet (20 mg total) by mouth every 48 hours.  Qty: 15 tablet, Refills: 11    Associated Diagnoses: Chronic systolic congestive heart failure      lisinopril (PRINIVIL,ZESTRIL) 2.5 MG tablet Take 1 tablet (2.5 mg total) by mouth once daily.  Qty: 30 tablet, Refills: 1      montelukast (SINGULAIR) 10 mg tablet Take 10 mg by mouth daily as needed.       umeclidinium (INCRUSE ELLIPTA) 62.5 mcg/actuation DsDv Inhale 62.5 mcg into the lungs once daily. Controller      docusate sodium (COLACE) 100 MG capsule Take 1 capsule (100 mg total) by mouth 2 (two) times daily.  Qty: 30 capsule, Refills: 0           Time spent on the discharge of patient: > 30 minutes    Marbin Reagan NP  Department of Hospital Medicine  [facility]

## 2017-07-07 NOTE — PLAN OF CARE
Problem: Patient Care Overview  Goal: Plan of Care Review  Outcome: Ongoing (interventions implemented as appropriate)  Free of falls/injury during shift  Pain managed effectively w/ PRN meds  2L/NC in place; pt w/ slight FRANCES  Serial troponin negative  NSR on telemetry  Safety interventions in place

## 2017-07-10 ENCOUNTER — OFFICE VISIT (OUTPATIENT)
Dept: PULMONOLOGY | Facility: CLINIC | Age: 57
End: 2017-07-10
Payer: MEDICARE

## 2017-07-10 VITALS
HEIGHT: 64 IN | BODY MASS INDEX: 18.48 KG/M2 | OXYGEN SATURATION: 99 % | SYSTOLIC BLOOD PRESSURE: 122 MMHG | HEART RATE: 98 BPM | DIASTOLIC BLOOD PRESSURE: 80 MMHG | RESPIRATION RATE: 18 BRPM | WEIGHT: 108.25 LBS

## 2017-07-10 DIAGNOSIS — J44.89 NOCTURNAL HYPOXEMIA DUE TO OBSTRUCTIVE CHRONIC BRONCHITIS: Chronic | ICD-10-CM

## 2017-07-10 DIAGNOSIS — I50.22 CHRONIC SYSTOLIC CONGESTIVE HEART FAILURE: ICD-10-CM

## 2017-07-10 DIAGNOSIS — J44.9 COPD, SEVERE: Primary | Chronic | ICD-10-CM

## 2017-07-10 DIAGNOSIS — J44.9 PULMONARY CACHEXIA DUE TO CHRONIC OBSTRUCTIVE PULMONARY DISEASE: ICD-10-CM

## 2017-07-10 DIAGNOSIS — G47.36 NOCTURNAL HYPOXEMIA DUE TO OBSTRUCTIVE CHRONIC BRONCHITIS: Chronic | ICD-10-CM

## 2017-07-10 DIAGNOSIS — G47.00 INSOMNIA WITH SLEEP APNEA: ICD-10-CM

## 2017-07-10 DIAGNOSIS — R64 PULMONARY CACHEXIA DUE TO CHRONIC OBSTRUCTIVE PULMONARY DISEASE: ICD-10-CM

## 2017-07-10 DIAGNOSIS — G47.30 INSOMNIA WITH SLEEP APNEA: ICD-10-CM

## 2017-07-10 PROCEDURE — 99214 OFFICE O/P EST MOD 30 MIN: CPT | Mod: S$GLB,,, | Performed by: INTERNAL MEDICINE

## 2017-07-10 PROCEDURE — 99999 PR PBB SHADOW E&M-EST. PATIENT-LVL IV: CPT | Mod: PBBFAC,,, | Performed by: INTERNAL MEDICINE

## 2017-07-10 NOTE — PROGRESS NOTES
Subjective:       Patient ID: Jeniffer Fernandez is a 57 y.o. female.    Chief Complaint: COPD and Shortness of Breath    Ms. Fernandez is 57 years old  She was recently in the hospital  Has Severe COPD with FEV1 40%  Has oxygen 2.0 LPM  She comes to see me because she has problems with falling asleep.  Sleep latency is up to 2 hours  She gets into bed at 11 PM wakes up at 7 AM  Daytime naps  For 1 hour  She denies any snoring  Was in hospital with chest pain, felt better with NIROPASTE  EF 30%    Denies any sleepiness while in the car driving for long period of time  COPD test score is 20  Medications were reviewed Incruse Ellipta Symbicort, albuterol and montelukast  She is asked for medications for anxiety or insomnia.  She has specifically asked for Ativan or Xanax  I have informed her that I would like to make sure that she doesn't have any obstructive sleep apnea he for any of these medications can be prescribed for sleep  A PCO2 of the last ABG was 43.6  I have reviewed the patient's medical history in detail and updated the computerized patient record.     STOP - BANG Questionnaire:     1. Snoring : Do you snore loudly ?    No    2. Tired : Do you often feel tired, fatigued, or sleepy during daytime? Yes    3. Observed: Has anyone observed you stop breathing during your sleep?   Yes     4. Blood pressure : Do you have or are you being treated for high blood pressure?   No    5. BMI :BMI more than 35 kg/m2?   No    6. Age : Age over 50 yr old?   Yes    7. Neck circumference: Neck circumference greater than 40 cm?   No    8. Gender: Gender male?   No    High risk of DWAYNE: Yes 5 - 8  Intermediate risk of DWAYNE: Yes 3 - 4  Low risk of DWAYNE: Yes 0 - 2      References:   STOP Questionnaire   A Tool to Screen Patients for Obstructive Sleep Apnea: RYAN Carmen., CORRINE Garner.B.B.S., Bandar Grimm M.D.,Charisma Godoy, Ph.D., CORRINE Eller.B.B.S.,_ Isac Chase.,_ Teena Robles M.D.,  "BETZY HowellC.; Anesthesiology 2008; 108:812-21 Copyright © 2008, the American Society of Anesthesiologists, Inc. [employer]          Review of Systems   Constitutional: Positive for fatigue.   HENT: Negative.    Respiratory: Positive for asthma nighttime symptoms.    Genitourinary: Negative.    Endocrine: endocrine negative   Musculoskeletal: Negative.    Skin: Negative.    Gastrointestinal: Negative.    Neurological: Negative.    Psychiatric/Behavioral: Positive for sleep disturbance.       Objective:       Vitals:    07/10/17 0902   BP: 122/80   BP Location: Right arm   Patient Position: Sitting   BP Method: Manual   Pulse: 98   Resp: 18   SpO2: 99%  Comment: oxye 2.0 LPM   Weight: 49.1 kg (108 lb 3.9 oz)   Height: 5' 4" (1.626 m)     Physical Exam   Constitutional: She is oriented to person, place, and time. She appears cachectic.   HENT:   Head: Normocephalic.   Nose: Nose normal.   Neck: Normal range of motion. Neck supple. No tracheal deviation present. No thyromegaly present.   Cardiovascular: Normal rate and regular rhythm.    Pulmonary/Chest: Normal expansion and hyperinflation.   Abdominal: Soft. Bowel sounds are normal.   Musculoskeletal: Normal range of motion.   Lymphadenopathy:     She has no cervical adenopathy.   Neurological: She is alert and oriented to person, place, and time.   Skin: Skin is warm and dry. Nails show no clubbing.   Psychiatric: She has a normal mood and affect.   Nursing note and vitals reviewed.    Personal Diagnostic Review  ABG: pH: 7.42   PaO2: 65 PaCO2 43.6   SaO2 93%    Chest x-ray:   Hyperinflation  Clear Lungs    Pulmonary function tests: FEV1: 1.06  (40 % predicted)    CONCLUSIONS     1 - Eccentric hypertrophy.     2 - Wall motion abnormalities.     3 - Moderately depressed left ventricular systolic function (EF 30-35%).     4 - Impaired LV relaxation, normal LAP (grade 1 diastolic dysfunction).     5 - Low normal right ventricular " systolic function   No flowsheet data found.      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       Plan:        Handicap tag given  INCRUSE Coupon given    Return in about 4 weeks (around 8/7/2017) for PSG.    This note was prepared using voice recognition system and is likely to have sound alike errors that may have been overlooked even after proof reading.  Please call me with any questions    Discussed diagnosis, its evaluation, treatment and usual course. All questions answered.    Thank you for the courtesy of participating in the care of this patient    Angel Broussard MD

## 2017-07-10 NOTE — PATIENT INSTRUCTIONS
Your provider has scheduled you for a sleep study.   You should be receiving a phone call from the sleep lab shortly after your study has been approved by your insurance. Please make sure you have your current phone numbers in the [facility] system. If you do not hear from anyone in the next 10 business days, please call the sleep lab at [phone] to schedule your sleep study. The sleep studies are performed at [facility] seven nights a week.  When you are scheduling your sleep study, they will also make you a follow up appointment with your provider. This follow up appointment will be 10-14 days after your sleep study to review the results. If it is noted that you do have sleep apnea on your initial sleep study, you may receive a call back for a second night study with the CPAP before you come back to the office.

## 2017-07-24 ENCOUNTER — OFFICE VISIT (OUTPATIENT)
Dept: CARDIOLOGY | Facility: CLINIC | Age: 57
End: 2017-07-24
Payer: MEDICARE

## 2017-07-24 VITALS
BODY MASS INDEX: 18.75 KG/M2 | WEIGHT: 109.81 LBS | HEIGHT: 64 IN | HEART RATE: 72 BPM | SYSTOLIC BLOOD PRESSURE: 92 MMHG | DIASTOLIC BLOOD PRESSURE: 70 MMHG

## 2017-07-24 DIAGNOSIS — J44.9 COPD, SEVERE: Chronic | ICD-10-CM

## 2017-07-24 DIAGNOSIS — F32.A DEPRESSION, UNSPECIFIED DEPRESSION TYPE: Chronic | ICD-10-CM

## 2017-07-24 DIAGNOSIS — I25.10 CORONARY ARTERY DISEASE INVOLVING NATIVE CORONARY ARTERY OF NATIVE HEART WITHOUT ANGINA PECTORIS: ICD-10-CM

## 2017-07-24 DIAGNOSIS — F31.9 BIPOLAR AFFECTIVE DISORDER, REMISSION STATUS UNSPECIFIED: ICD-10-CM

## 2017-07-24 DIAGNOSIS — I50.22 CHRONIC SYSTOLIC CONGESTIVE HEART FAILURE: Primary | ICD-10-CM

## 2017-07-24 PROCEDURE — 99214 OFFICE O/P EST MOD 30 MIN: CPT | Mod: S$GLB,,, | Performed by: INTERNAL MEDICINE

## 2017-07-24 PROCEDURE — 99999 PR PBB SHADOW E&M-EST. PATIENT-LVL III: CPT | Mod: PBBFAC,,, | Performed by: INTERNAL MEDICINE

## 2017-07-24 RX ORDER — LISINOPRIL 2.5 MG/1
2.5 TABLET ORAL DAILY
Qty: 30 TABLET | Refills: 5 | Status: SHIPPED | OUTPATIENT
Start: 2017-07-24 | End: 2017-09-22

## 2017-07-24 RX ORDER — TIOTROPIUM BROMIDE 18 UG/1
18 CAPSULE ORAL; RESPIRATORY (INHALATION) DAILY
COMMUNITY

## 2017-07-24 RX ORDER — ATORVASTATIN CALCIUM 40 MG/1
40 TABLET, FILM COATED ORAL DAILY
Qty: 30 TABLET | Refills: 5 | Status: SHIPPED | OUTPATIENT
Start: 2017-07-24 | End: 2017-09-22

## 2017-07-24 RX ORDER — CARVEDILOL 3.12 MG/1
3.12 TABLET ORAL 2 TIMES DAILY
Qty: 60 TABLET | Refills: 5 | Status: SHIPPED | OUTPATIENT
Start: 2017-07-24 | End: 2017-09-22

## 2017-07-24 NOTE — PROGRESS NOTES
Subjective:   Patient ID:  Jeniffer Fernandez is a 57 y.o. female who presents for follow up of Congestive Heart Failure      56 yo female, came in for discharge f/u.  PMH CAD, recent NSTEMI, CHFrEF 25%, COPD, smoker, home o2, bipolar.  05/2017, she was admitted to [facility] for AMS, resp. Failure. She developed ACS/NSTEMI with TWI on ekg and elevated troponin. Invasive procedure was held due to AMS. CTA of Chest indicated a patchy RUL/RLL interstitial infiltrate. She stayed in ICU for COPD exacerbation and AMS. Before discharge, nuclear stress test showed anterior small to moderate sized infarct with damon ischemia. EF 25%.  , admitted again for COPD exacerbation. Normal BNP.            Past Medical History:   Diagnosis Date    Anxiety     Bipolar 1 disorder     Chronic systolic heart failure     Closed fracture     COPD (chronic obstructive pulmonary disease)     Coronary artery disease     Depression     Lung infiltrate     MI (mitral incompetence)     Pulmonary cachexia due to COPD     Respiratory distress     SOB (shortness of breath)        Past Surgical History:   Procedure Laterality Date    COLONOSCOPY      surgery of proximal humerus      TUBAL LIGATION         Social History   Substance Use Topics    Smoking status: Former Smoker     Packs/day: 1.00     Years: 40.00     Types: Cigarettes     Quit date: 5/1/2017    Smokeless tobacco: Never Used      Comment: decreasing amount of cigarettes    Alcohol use No      Comment: Quit alcohol 5/2017       Family History   Problem Relation Age of Onset    COPD Mother     Cancer Father      brain    Aneurysm Brother     Suicide Son     No Known Problems Son     No Known Problems Son          Review of Systems   Constitution: Negative for decreased appetite, diaphoresis, fever, weakness, malaise/fatigue and night sweats.   HENT: Negative for headaches and nosebleeds.    Eyes: Negative for blurred vision and double vision.   Cardiovascular:  Negative for chest pain, claudication, dyspnea on exertion, irregular heartbeat, leg swelling, near-syncope, orthopnea, palpitations, paroxysmal nocturnal dyspnea and syncope.   Respiratory: Positive for shortness of breath. Negative for cough, sleep disturbances due to breathing, snoring, sputum production and wheezing.    Endocrine: Negative for cold intolerance and polyuria.   Hematologic/Lymphatic: Does not bruise/bleed easily.   Skin: Negative for rash.   Musculoskeletal: Negative for back pain, falls, joint pain, joint swelling and neck pain.   Gastrointestinal: Negative for abdominal pain, heartburn, nausea and vomiting.   Genitourinary: Negative for dysuria, frequency and hematuria.   Neurological: Negative for difficulty with concentration, dizziness, focal weakness, light-headedness, numbness and seizures.   Psychiatric/Behavioral: Negative for depression, memory loss and substance abuse. The patient does not have insomnia.    Allergic/Immunologic: Negative for HIV exposure and hives.       Objective:   Physical Exam   Constitutional: She is oriented to person, place, and time. She appears well-nourished.   HENT:   Head: Normocephalic.   Eyes: Pupils are equal, round, and reactive to light.   Neck: Normal carotid pulses and no JVD present. Carotid bruit is not present. No thyromegaly present.   Cardiovascular: Normal rate, regular rhythm, normal heart sounds and normal pulses.   No extrasystoles are present. PMI is not displaced.  Exam reveals no gallop and no S3.    No murmur heard.  Pulmonary/Chest: Breath sounds normal. No stridor. No respiratory distress.   Decreased BS   Abdominal: Soft. Bowel sounds are normal. There is no tenderness. There is no rebound.   Musculoskeletal: Normal range of motion.   Neurological: She is alert and oriented to person, place, and time.   Skin: Skin is intact. No rash noted.   Psychiatric: Her behavior is normal.       No results found for: CHOL  No results found for:  HDL  No results found for: LDLCALC  No results found for: TRIG  No results found for: CHOLHDL    Chemistry        Component Value Date/Time     07/07/2017 0414    K 4.5 07/07/2017 0414     07/07/2017 0414    CO2 25 07/07/2017 0414    BUN 11 07/07/2017 0414    CREATININE 0.7 07/07/2017 0414     (H) 07/07/2017 0414        Component Value Date/Time    CALCIUM 9.3 07/07/2017 0414    ALKPHOS 57 07/06/2017 1150    AST 25 07/06/2017 1150    ALT 31 07/06/2017 1150    BILITOT 0.6 07/06/2017 1150    ESTGFRAFRICA >60 07/07/2017 0414    EGFRNONAA >60 07/07/2017 0414          No results found for: TSH  Lab Results   Component Value Date    INR 1.0 05/20/2017    INR 1.0 05/14/2017     Lab Results   Component Value Date    WBC 8.87 07/07/2017    HGB 12.8 07/07/2017    HCT 38.4 07/07/2017    MCV 96 07/07/2017     07/07/2017     BMP  Sodium   Date Value Ref Range Status   07/07/2017 141 136 - 145 mmol/L Final     Potassium   Date Value Ref Range Status   07/07/2017 4.5 3.5 - 5.1 mmol/L Final     Chloride   Date Value Ref Range Status   07/07/2017 106 95 - 110 mmol/L Final     CO2   Date Value Ref Range Status   07/07/2017 25 23 - 29 mmol/L Final     BUN, Bld   Date Value Ref Range Status   07/07/2017 11 6 - 20 mg/dL Final     Creatinine   Date Value Ref Range Status   07/07/2017 0.7 0.5 - 1.4 mg/dL Final     Calcium   Date Value Ref Range Status   07/07/2017 9.3 8.7 - 10.5 mg/dL Final     Anion Gap   Date Value Ref Range Status   07/07/2017 10 8 - 16 mmol/L Final     eGFR if    Date Value Ref Range Status   07/07/2017 >60 >60 mL/min/1.73 m^2 Final     eGFR if non    Date Value Ref Range Status   07/07/2017 >60 >60 mL/min/1.73 m^2 Final     Comment:     Calculation used to obtain the estimated glomerular filtration  rate (eGFR) is the CKD-EPI equation. Since race is unknown   in our information system, the eGFR values for   -American and Non--American patients  are given   for each creatinine result.       CrCl cannot be calculated (Patient's most recent sCr result is older than the maximum 7 days allowed.).     Assessment:      1. Chronic systolic congestive heart failure    2. Coronary artery disease involving native coronary artery of native heart without angina pectoris    3. COPD, severe    4. Bipolar affective disorder, remission status unspecified    5. Depression, unspecified depression type      Still c/o dyspnea episodes of 5 or 6 times a day. On home O2 Rx  CHFrEF compensated.   Plan:   advsie to have aerosal Rx q 6 to q4 and F/u with Dr. Broussard  Repeat echo in 4 week for EF   Continue ASA, Lipitor, coreg and acei  RTC in 2 months

## 2017-08-01 ENCOUNTER — TELEPHONE (OUTPATIENT)
Dept: CARDIOLOGY | Facility: CLINIC | Age: 57
End: 2017-08-01

## 2017-08-01 NOTE — TELEPHONE ENCOUNTER
Left message with new time for echocardiogram, apologized for inconvenience, left number to call should she want to reschedule, and mailed a reminder with new date and time.

## 2017-08-22 ENCOUNTER — HOSPITAL ENCOUNTER (OUTPATIENT)
Dept: SLEEP MEDICINE | Facility: HOSPITAL | Age: 57
Discharge: HOME | End: 2017-08-22
Attending: INTERNAL MEDICINE
Payer: MEDICARE

## 2017-08-22 DIAGNOSIS — J44.89 NOCTURNAL HYPOXEMIA DUE TO OBSTRUCTIVE CHRONIC BRONCHITIS: Chronic | ICD-10-CM

## 2017-08-22 DIAGNOSIS — J44.9 COPD, SEVERE: Chronic | ICD-10-CM

## 2017-08-22 DIAGNOSIS — J44.9 PULMONARY CACHEXIA DUE TO CHRONIC OBSTRUCTIVE PULMONARY DISEASE: ICD-10-CM

## 2017-08-22 DIAGNOSIS — G47.21 DELAYED SLEEP PHASE SYNDROME: ICD-10-CM

## 2017-08-22 DIAGNOSIS — G47.61 PLMD (PERIODIC LIMB MOVEMENT DISORDER): ICD-10-CM

## 2017-08-22 DIAGNOSIS — G47.30 INSOMNIA WITH SLEEP APNEA: ICD-10-CM

## 2017-08-22 DIAGNOSIS — R64 PULMONARY CACHEXIA DUE TO CHRONIC OBSTRUCTIVE PULMONARY DISEASE: ICD-10-CM

## 2017-08-22 DIAGNOSIS — G47.36 NOCTURNAL HYPOXEMIA DUE TO OBSTRUCTIVE CHRONIC BRONCHITIS: Chronic | ICD-10-CM

## 2017-08-22 DIAGNOSIS — I50.22 CHRONIC SYSTOLIC CONGESTIVE HEART FAILURE: ICD-10-CM

## 2017-08-22 DIAGNOSIS — G47.36 SLEEP RELATED HYPOVENTILATION/HYPOXEMIA IN CONDITIONS CLASSIFIABLE ELSEWHERE: ICD-10-CM

## 2017-08-22 DIAGNOSIS — G47.00 INSOMNIA WITH SLEEP APNEA: ICD-10-CM

## 2017-08-22 PROCEDURE — 95810 POLYSOM 6/> YRS 4/> PARAM: CPT | Mod: 26,,, | Performed by: INTERNAL MEDICINE

## 2017-08-22 PROCEDURE — 95810 POLYSOM 6/> YRS 4/> PARAM: CPT

## 2017-08-24 ENCOUNTER — PATIENT MESSAGE (OUTPATIENT)
Dept: SLEEP MEDICINE | Facility: HOSPITAL | Age: 57
End: 2017-08-24

## 2017-08-24 NOTE — PROCEDURES
"STUDY PARAMETERS: The study was performed with a sleep technologist in attendance for the entire test period.  Video monitoring was carried out throughout the study, and the following clinical parameters were recorded:    SUMMARY STATEMENTS:  1. Findings related to sleep diagnoses:   No snoring.  Overall AHI was 0.4 events per hour.  Total events 2.  Cumulatively oxygen saturation was below 88% for 17.9 minutes.  1 central apnea episode.  2. EEG abnormalities:   Sleep latency was delayed.  Wake after sleep onset was low (28.8 minutes).     Sleep was well consolidated.  Arousal was low 18.1 events per hour.   Mild periodic limb movements with arousal.   No alpha intrusion.  3. ECG abnormalities:   Normal EKG.  Average heart rate 86 bpm.  4. Behavioral observations:  a. Recording Tech Comments; patient is awake between 090 8 PM until 11 0 1 PM    INTERPRETATION:     1. Delayed sleep onset with low wake after sleep.  Suggests either   sleep onset insomnia or delayed sleep phase.  2. Hypoxemia related to primary respiratory condition COPD.  3. Mild periodic limb movements with sleep.        RECOMMENDATIONS:     1. Cognitive behavioral therapy for insomnia.  Melatonin may be considered.  Bright light therapy in the morning.  2. Supplementary oxygen is indicated with sleep.  3. Clinical correlation for restless leg check ferritin.    See imported Sleep Study result in "Chart Review" under the   "Media tab".      (This Sleep Study was interpreted by a Board Certified Sleep   Specialist who conducted an epoch-by-epoch review of the entire   raw data recording.)     (The indication for this sleep study was reviewed and deemed   appropriate by AASM Practice Parameters or other reasons by a   Board Certified Sleep Specialist.)    Angel Broussard MD      "

## 2017-09-05 ENCOUNTER — TELEPHONE (OUTPATIENT)
Dept: PULMONOLOGY | Facility: CLINIC | Age: 57
End: 2017-09-05

## 2017-09-05 NOTE — TELEPHONE ENCOUNTER
----- Message from Gayatri Hood sent at 9/1/2017  3:46 PM CDT -----  Would like to speak to nurse about results. Please callback at [phone]. thanks

## 2017-09-13 ENCOUNTER — TELEPHONE (OUTPATIENT)
Dept: PULMONOLOGY | Facility: HOSPITAL | Age: 57
End: 2017-09-13

## 2017-09-20 ENCOUNTER — TELEPHONE (OUTPATIENT)
Dept: PULMONOLOGY | Facility: HOSPITAL | Age: 57
End: 2017-09-20

## 2017-10-10 ENCOUNTER — TELEPHONE (OUTPATIENT)
Dept: PULMONOLOGY | Facility: HOSPITAL | Age: 57
End: 2017-10-10

## 2017-10-23 ENCOUNTER — TELEPHONE (OUTPATIENT)
Dept: PULMONOLOGY | Facility: HOSPITAL | Age: 57
End: 2017-10-23

## 2017-11-03 ENCOUNTER — PATIENT MESSAGE (OUTPATIENT)
Dept: RESEARCH | Facility: HOSPITAL | Age: 57
End: 2017-11-03

## 2017-12-28 ENCOUNTER — TELEPHONE (OUTPATIENT)
Dept: PULMONOLOGY | Facility: HOSPITAL | Age: 57
End: 2017-12-28

## 2019-09-10 NOTE — Clinical Note
"STUDY PARAMETERS: The study was performed with a sleep technologist in attendance for the entire test period.  Video monitoring was carried out throughout the study, and the following clinical parameters were recorded:  SUMMARY STATEMENTS: 1. Findings related to sleep diagnoses: " No snoring.  Overall AHI was 0.4 events per hour.  Total events 2.  Cumulatively oxygen saturation was below 88% for 17.9 minutes.  1 central apnea episode. 2. EEG abnormalities: " Sleep latency was delayed.  Wake after sleep onset was low (28.8 minutes).   " Sleep was well consolidated.  Arousal was low 18.1 events per hour. " Mild periodic limb movements with arousal. " No alpha intrusion. 3. ECG abnormalities: " Normal EKG.  Average heart rate 86 bpm. 4. Behavioral observations: a. Recording Tech Comments; patient is awake between 090 8 PM until 11 0 1 PM  INTERPRETATION:   1. Delayed sleep onset with low wake after sleep.  Suggests either   sleep onset insomnia or delayed sleep phase. 2. Hypoxemia related to pr" Event Note

## 2024-01-07 NOTE — ASSESSMENT & PLAN NOTE
Cbc normal  no anemia  3 month average blood sugar is normal Her episodes of confusion and agitation are unclear.  She was not hypoxic and only slightly hypercapneic.  She has a history of depression and misuse of medication and alcohol.  Her  has been consistent, helpful, and seems reliable in thinking she has not been drinking heavily or consistently in the recent weeks. CT head is negative for an acute intracranial process.  Neurology evaluated.  EEG unrevealing.  Ammonia level has normalized.

## 2024-04-08 NOTE — PROGRESS NOTES
[facility] Hospital Medicine  Progress Note    Patient Name: Jeniffer eFrnandez  MRN: [id number]  Patient Class: IP- Inpatient   Admission Date: 5/14/2017  Length of Stay: 10 days  Attending Physician: Adin Sanchez MD  Primary Care Provider: Ben Pelaez Jr, MD    Subjective:     Principal Problem:Acute on chronic respiratory failure with hypoxia    HPI:  Jeniffer Fernandez is a 56 yo female + smoker with COPD and chronic respiratory failure who presented with worsening SOB over 1 week. For 5 days she had worn oxygen continuously. She describes frequent productive cough, wheezing, SOB, generalized weakness and FRANCES. Prior to presentation she had subjective fever and sweats. Pt has had several neb treatments and still having SOB. In the ED, ABG reflects acute on chronic hypoxic respiratory failure and O2 sat 87 %. CTA of Chest indicated a patchy RUL/RLL interstitial infiltrate.     Hospital Course:  Very agitated late morning 16 May and received Haldol and Ativan and slept most of the day.  She was better the following morning but became more confused and agitated in the evening and combative with staff.  Increased wheezing and pulling her oxygen off with desaturations.  When stable her blood gas showed adequate oxygenation and a pCO2 only slightly high at 50.  Transferred to ICU for respiratory failure. Was started on Precedex that has now been discontinued.    5/18 - Continued to have intermittent periods of agitation. Negative head CT and Neurology consult placed.    5/19 - Evening agitation.    5/20 - Started heparin drip and aspirin per Cardiology recommendations.  Echo shows HF with reduced ejection fraction of 30% and slightly elevated troponin.    5/21 - Symptoms of agitation have improved.    5/22 - Patient with continued improved in mental status.    5/23 - Some intermittent agitation in the evening. Failed swallow evaluation.    Interval History: Failed a swallow evaluation yesterday. Denied any  acute complaints today.     Review of Systems   Constitutional: Negative for activity change and appetite change.   HENT: Negative for congestion and dental problem.    Respiratory: Negative for cough and chest tightness.    Cardiovascular: Negative for chest pain and leg swelling.   Gastrointestinal: Negative for abdominal distention and abdominal pain.   Endocrine: Negative for cold intolerance.   Neurological: Negative for dizziness.     Objective:     Vital Signs (Most Recent):  Temp: 98.8 °F (37.1 °C) (05/24/17 0705)  Pulse: (!) 117 (05/24/17 0739)  Resp: 20 (05/24/17 0739)  BP: 117/84 (05/24/17 0705)  SpO2: 100 % (05/24/17 0739) Vital Signs (24h Range):  Temp:  [97.9 °F (36.6 °C)-98.9 °F (37.2 °C)] 98.8 °F (37.1 °C)  Pulse:  [] 117  Resp:  [15-26] 20  SpO2:  [81 %-100 %] 100 %  BP: ()/() 117/84     Weight: 49.1 kg (108 lb 3.9 oz)  Body mass index is 18.58 kg/m².    Intake/Output Summary (Last 24 hours) at 05/24/17 0954  Last data filed at 05/24/17 0900   Gross per 24 hour   Intake             2562 ml   Output             3415 ml   Net             -853 ml      Physical Exam   Constitutional: She appears well-developed and well-nourished. No distress.   HENT:   Head: Normocephalic and atraumatic.   Mouth/Throat: Oropharynx is clear and moist.   Eyes: Conjunctivae and EOM are normal. Pupils are equal, round, and reactive to light.   Neck: Neck supple. No JVD present. No thyromegaly present.   Cardiovascular: Normal rate and regular rhythm.  Exam reveals no gallop and no friction rub.    No murmur heard.  Pulmonary/Chest: She has wheezes. She has no rales.   Coarse crackles bilaterally.  Expiratory wheezes and generally reduced breath sounds.   Abdominal: Soft. Bowel sounds are normal. She exhibits no distension. There is no tenderness. There is no rebound and no guarding.   Musculoskeletal: Normal range of motion. She exhibits no edema or deformity.   Lymphadenopathy:     She has no cervical  adenopathy.   Neurological: She has normal reflexes.   Somnolent   Skin: Skin is warm and dry. No rash noted.   Nursing note and vitals reviewed.    Significant Labs:   A1C: No results for input(s): HGBA1C in the last 4320 hours.  ABGs: No results for input(s): PH, PCO2, HCO3, POCSATURATED, BE in the last 48 hours.  Bilirubin:   Recent Labs  Lab 05/18/17  0442 05/19/17  0409 05/20/17  0400 05/21/17  0418 05/24/17  0730   BILIDIR  --  0.1  --   --   --    BILITOT 0.3 0.3 0.3 0.3 0.3     Blood Culture: No results for input(s): LABBLOO in the last 48 hours.  BMP:   Recent Labs  Lab 05/23/17 0445 05/24/17  0845   *  < > 104     < > 139   K 4.2  < > 4.2     < > 104   CO2 29  < > 28   BUN 8  < > 5*   CREATININE 0.6  < > 0.6   CALCIUM 8.4*  < > 8.7   MG 2.1  --   --    < > = values in this interval not displayed.  CBC:   Recent Labs  Lab 05/23/17 0445   WBC 9.55   HGB 11.5*   HCT 36.4*        CMP:   Recent Labs  Lab 05/23/17 0445 05/24/17  0730 05/24/17  0845    144 139   K 4.2 2.5* 4.2    122* 104   CO2 29 19* 28   * 71 104   BUN 8 4* 5*   CREATININE 0.6 0.3* 0.6   CALCIUM 8.4* 5.0* 8.7   PROT  --  3.2*  --    ALBUMIN  --  1.6*  --    BILITOT  --  0.3  --    ALKPHOS  --  42*  --    AST  --  14  --    ALT  --  33  --    ANIONGAP 5* 3* 7*   EGFRNONAA >60 >60 >60     Cardiac Markers: No results for input(s): CKMB, MYOGLOBIN, BNP, TROPISTAT in the last 48 hours.  Coagulation:   Recent Labs  Lab 05/23/17 0445   APTT 37.1*     Lactic Acid: No results for input(s): LACTATE in the last 48 hours.  Lipase: No results for input(s): LIPASE in the last 48 hours.  Lipid Panel: No results for input(s): CHOL, HDL, LDLCALC, TRIG, CHOLHDL in the last 48 hours.    Significant Imaging:   Imaging Results          X-Ray Chest 1 View (Final result)  Result time 05/24/17 08:48:54    Final result by Dontrell Shrestha MD (05/24/17 08:48:54)                 Impression:     Worsening peripheral  interstitial opacities in the lower lobes this has the appearance of interstitial edema.              Electronically signed by: EDISON OLIVIER MD  Date:     05/24/17  Time:    08:48              Narrative:    Exam: XR CHEST 1 VIEW    Clinical History: Shortness of breath. Acute respiratory failure    Findings:     Worsening peripheral lower lobe of reticular interstitial opacities.  The right upper lobe interstitial infiltrate is unchanged. Lung hyperinflation with emphysema. The cardiac silhouette is within normal limits.  NG tube in the stomach.                             X-Ray Chest 1 View (Final result)  Result time 05/22/17 08:09:28    Final result by Edison Olivier MD (05/22/17 08:09:28)                 Impression:     As above.            Electronically signed by: EDISON OLIVIER MD  Date:     05/22/17  Time:    08:09              Narrative:    Exam: XR CHEST 1 VIEW    Clinical History: Shortness of breath. SOB    Findings:     Spell and fainting interstitial infiltrate in the right upper lobe.  Reticular interstitial opacities appear increased in the periphery of the lower lobes. The cardiac silhouette is within normal limits.  No pneumothorax.  NG tube in the stomach.  Emphysema.                             X-Ray Chest 1 View (Final result)  Result time 05/21/17 08:03:02    Final result by Eric Salomon MD (05/21/17 08:03:02)                 Impression:     See above.      Electronically signed by: ERIC SALOMON  Date:     05/21/17  Time:    08:03              Narrative:    Chest x-ray single view    Indication: Shortness of breath.    Findings: Comparison study 5/20/2017.  No change.                             US Abdomen Limited (Final result)  Result time 05/20/17 12:54:42    Final result by Hussein Simon MD (05/20/17 12:54:42)                 Impression:           1.  1.7 cm right hepatic lobe cyst.  2.  Mild gallbladder wall thickening, without gallstones, pericholecystic fluid or sonographic Newsome sign.   Chronic cholecystitis could cause this appearance.  3.  Otherwise, normal study.      Electronically signed by: LAKISHA RITCHIE MD  Date:     05/20/17  Time:    12:54              Narrative:    Right quadrant ultrasound, color-flow and spectral doppler interrogation    History:    Acute encephalopathy with elevated Ammonia.  Suspect fibrosis/cirrhosis but no ascites..  Abnormal results of liver function studies.    Findings:   No comparison studies are available.  There is a 1.7 cm right hepatic lobe cyst. The liver is otherwise normal, and measures 13.5 cm. The gallbladder is normal.  Mild gallbladder wall thickening. Negative for sonographic Newsome's sign.  The common duct measures 4 mm. The right kidney measures 10.5 cm.  It is without focal solid or cystic masses, hydronephrosis, perinephric fluid collections or shadowing stones. The visualized portions of the pancreas are normal.  The aorta and IVC are normal in caliber.  Hepatopedal flow is documented in the portal vein.  Negative for ascites.    Hepatorenal index is not recorded.                             X-Ray Chest 1 View (Final result)  Result time 05/20/17 09:15:27    Final result by David Elliott Jr., MD (05/20/17 09:15:27)                 Impression:     No significant interval change.      Electronically signed by: DAVID ELLIOTT  Date:     05/20/17  Time:    09:15              Narrative:    Exam: Portable chest radiograph    History:    Shortness of breath.    Comparison: 5/19/2017    Findings: Lungs are clear with prominent interstitium similar as before.  Enteric tube is in the stomach.  Cardiac silhouette and mediastinum within normal limits.  No effusion or pneumothorax.                             X-Ray Chest 1 View (Final result)  Result time 05/19/17 20:37:23    Final result by CHILANGO Rodriguez Sr., MD (05/19/17 20:37:23)                 Impression:        1.  There is a mild amount of interstitial opacities seen in both lungs with  Kerley B-lines visualized bilaterally.  This is characteristic of pulmonary edema.  2.  An NG tube remains in place.    3.  There is surgical hardware in the proximal portion of the left humerus.        Electronically signed by: CHILANGO RODRIGUEZ MD  Date:     05/19/17  Time:    20:37              Narrative:    One-view chest x-ray    Clinical History: Hypoxia    Finding: Comparison was made to a prior examination performed on 5/18/2017.  An NG tube remains in place.  The size of the heart is normal.  There is a mild amount of interstitial opacities seen in both lungs with Kerley B-lines visualized bilaterally.  There is no pneumothorax.  The costophrenic angles are sharp.  There is surgical hardware in the proximal portion of the left humerus.                             X-Ray Chest 1 View (Final result)  Result time 05/18/17 11:49:18    Final result by James Caruso MD (05/18/17 11:49:18)                 Impression:      Please see above.      Electronically signed by: JAMES CARUSO MD  Date:     05/18/17  Time:    11:49              Narrative:    Exam: XR CHEST 1 VIEW,    Date:  05/18/17 11:04:25    History: NG tube placement    Comparison:  Earlier film from the same day.    Findings: Nasogastric tube has been placed with the distal tip well below the GE junction in good position.  Otherwise no change.                             CT Head Without Contrast (Final result)  Result time 05/18/17 10:46:24    Final result by James Caruso MD (05/18/17 10:46:24)                 Impression:         Negative noncontrast CT scan of the brain.         All CT scans at this facility use dose modulation, iterative reconstruction, and/or weight based dosing when appropriate to reduce radiation dose to as low as reasonably achievable.       Electronically signed by: JAMES CARUSO MD  Date:     05/18/17  Time:    10:46              Narrative:    CT HEAD WITHOUT CONTRAST    Date: 05/18/17 10:36:40    Clinical  Satisfactory indication: Altered mental status.  Nonresponsive patient.     Technique:  Standard noncontrast CT scan of the brain. No previous for comparison.     Findings:  The ventricles are nondilated. Gray and white matter structures reveal normal attenuation. No hemorrhage, mass effect or edema is identified.     The skull and skull base are unremarkable.                             X-Ray Chest 1 View (Final result)  Result time 05/18/17 08:02:46    Final result by James Caruso MD (05/18/17 08:02:46)                 Impression:      Please see above.      Electronically signed by: JAMES CARUSO MD  Date:     05/18/17  Time:    08:02              Narrative:    Exam: XR CHEST 1 VIEW,    Date:  05/18/17 05:34:00    History: SOB    Comparison:  05/14/2017.    Findings: Heart size is normal.  No pneumothorax.  Reticulonodular lung disease is slightly improved.  Postoperative changes left humeral neck.                             CTA Chest Non-Coronary (Final result)  Result time 05/14/17 10:56:46    Final result by Dustin Caal MD (05/14/17 10:56:46)                 Impression:      Negative for pulmonary embolus.    Patchy right upper lobe and right lower lobe interstitial infiltrate/pneumonia with some nodularity at the dependent right lower lobe.  No pleural effusion.  Short term imaging followup after appropriate treatment is recommended.    Mediastinal adenopathy, perhaps reactive.  Attention on followup.    Emphysema.    Moderate stenosis proximal celiac artery.          All CT scans at this facility use dose modulation, iterative reconstruction, and/or weight based dosing when appropriate to reduce radiation dose to as low as reasonably achievable.      Electronically signed by: DUSTIN CAAL MD  Date:     05/14/17  Time:    10:56              Narrative:    EXAM: CTA CHEST NON CORONARY    CLINICAL HISTORY: shortness of breath    TECHNIQUE: CT angiogram performed of the chest following IV contrast  administration to evaluate for pulmonary emboli. Multiplanar MIP reformations performed.    COMPARISON STUDIES: Chest x-ray May 14, 2017    FINDINGS:  No evidence of pulmonary embolus.  Minimal aortic atherosclerosis without aneurysm or dissection.    Marked emphysematous changes in the lungs with apical and paraseptal bullous changes.  Patchy interstitial infiltrate posterior right upper lobe and posterior right lower lobe.  Some areas of nodularity in the dependent right lung base within this area of interstitial thickening with largest area of nodularity measuring 1.9 cm.    Numerous enlarged mediastinal lymph nodes are noted with a representative subcarinal lymph node measuring 1.4 x 1.3 cm.    Right hepatic lobe cyst.  Moderate narrowing proximal segment celiac artery.                             X-Ray Chest 1 View (Final result)  Result time 05/14/17 09:11:48    Final result by David Caal MD (05/14/17 09:11:48)                 Impression:      Diffuse chronic interstitial prominence with improving right upper lobe infiltrate with a small amount of residual right upper lobe interstitial markings remaining from prior chest x-ray.  Continued short-term followup recommended.      Electronically signed by: DAVID CAAL MD  Date:     05/14/17  Time:    09:11              Narrative:    EXAM: XR CHEST 1 VIEW    CLINICAL HISTORY: Shortness of breath.    COMPARISON STUDIES: December 22, 2016    FINDINGS:  The cardiomediastinal silhouette is within normal limits.  Diffuse mild interstitial prominence throughout the lungs with minimal asymmetric markings in the right upper lobe, though improved from prior exam.  Surgical plate proximal left humerus.                                Assessment/Plan:      Delirium due to another medical condition, acute, hyperactive    Her episodes of confusion and agitation are unclear. She has a history of depression and misuse of medication and alcohol. CT head is negative for an acute  intracranial process.  Neurology evaluated.  EEG unrevealing.  Ammonia level has normalized. Symptoms are improving slowly. Seroquel has been discontinued. Avoid sedating medications.        * Acute on chronic respiratory failure with hypoxia    Supplemental oxygen to maintain sat > 92 % - wean as appropriate.  She has severe underlying COPD with most recent PFT in 2014 and has continued to smoke since then.  Pulmonary following.        Anxiety    Holding all home psychiatric medications.  Careful use of Benzodiazepines with severe COPD.  PRN Ativan for agitation.  Seroquel has been discontinued.        COPD exacerbation    Supplemental oxygen  Xopenex  IV Corticosteroids held.        LFT elevation    LFT's were normal on admission.  Check daily LFTs. Ammonia level was marginally elevated and responded to Lactulose. Liver Ultrasound showed a hepatic cyst and a mildly thickened gall bladder. Hepatitis panel is negative.         Physical deconditioning    PT evaluation.          Chronic systolic heart failure    Most likely present when she was admitted given her history and risk factors.  Echo showed ejection fraction of 30%.  Cardiology on consult with recommendations.  Optimize medical management.  Suspect ischemic heart disease and anticipate LHC when medically stable. Heparin infusion has been discontinued.  Patient started on ASA, statin and Clopidogrel.        Tobacco abuse    Must quit smoking and alcohol.  Denies need for Nicotine patch        Bipolar disorder    Holding all psychiatric meds.  She endorsed a history of misuse of Alcohol and Latuda.   denies any formal diagnosis of Bipolar Disorder.          VTE Risk Mitigation         Ordered     enoxaparin injection 40 mg  Daily     Route:  Subcutaneous        05/23/17 1153     Medium Risk of VTE  Once      05/14/17 1132     Place sequential compression device  Until discontinued      05/14/17 1132        Adin Sanchez MD  Department of Hospital  Medicine   [facility]